# Patient Record
Sex: MALE | Race: WHITE | NOT HISPANIC OR LATINO | URBAN - METROPOLITAN AREA
[De-identification: names, ages, dates, MRNs, and addresses within clinical notes are randomized per-mention and may not be internally consistent; named-entity substitution may affect disease eponyms.]

---

## 2017-02-15 ENCOUNTER — APPOINTMENT (RX ONLY)
Dept: URBAN - METROPOLITAN AREA CLINIC 143 | Facility: CLINIC | Age: 77
Setting detail: DERMATOLOGY
End: 2017-02-15

## 2017-02-15 DIAGNOSIS — D22 MELANOCYTIC NEVI: ICD-10-CM

## 2017-02-15 DIAGNOSIS — L82.1 OTHER SEBORRHEIC KERATOSIS: ICD-10-CM

## 2017-02-15 DIAGNOSIS — L91.8 OTHER HYPERTROPHIC DISORDERS OF THE SKIN: ICD-10-CM

## 2017-02-15 DIAGNOSIS — D18.0 HEMANGIOMA: ICD-10-CM

## 2017-02-15 DIAGNOSIS — Z85.820 PERSONAL HISTORY OF MALIGNANT MELANOMA OF SKIN: ICD-10-CM

## 2017-02-15 DIAGNOSIS — Z85.828 PERSONAL HISTORY OF OTHER MALIGNANT NEOPLASM OF SKIN: ICD-10-CM

## 2017-02-15 DIAGNOSIS — L81.4 OTHER MELANIN HYPERPIGMENTATION: ICD-10-CM

## 2017-02-15 DIAGNOSIS — L57.0 ACTINIC KERATOSIS: ICD-10-CM

## 2017-02-15 PROBLEM — D18.01 HEMANGIOMA OF SKIN AND SUBCUTANEOUS TISSUE: Status: ACTIVE | Noted: 2017-02-15

## 2017-02-15 PROBLEM — D22.5 MELANOCYTIC NEVI OF TRUNK: Status: ACTIVE | Noted: 2017-02-15

## 2017-02-15 PROBLEM — I10 ESSENTIAL (PRIMARY) HYPERTENSION: Status: ACTIVE | Noted: 2017-02-15

## 2017-02-15 PROBLEM — M12.9 ARTHROPATHY, UNSPECIFIED: Status: ACTIVE | Noted: 2017-02-15

## 2017-02-15 PROCEDURE — ? LIQUID NITROGEN

## 2017-02-15 PROCEDURE — ? SKIN TAG REMOVAL (COSMETIC)

## 2017-02-15 PROCEDURE — 99214 OFFICE O/P EST MOD 30 MIN: CPT | Mod: 25

## 2017-02-15 PROCEDURE — ? COUNSELING

## 2017-02-15 PROCEDURE — 17000 DESTRUCT PREMALG LESION: CPT

## 2017-02-15 PROCEDURE — 17003 DESTRUCT PREMALG LES 2-14: CPT

## 2017-02-15 ASSESSMENT — LOCATION SIMPLE DESCRIPTION DERM
LOCATION SIMPLE: ABDOMEN
LOCATION SIMPLE: LEFT FOREHEAD
LOCATION SIMPLE: SUPERIOR FOREHEAD
LOCATION SIMPLE: POSTERIOR NECK
LOCATION SIMPLE: RIGHT FOREHEAD
LOCATION SIMPLE: POSTERIOR SCALP

## 2017-02-15 ASSESSMENT — LOCATION DETAILED DESCRIPTION DERM
LOCATION DETAILED: PERIUMBILICAL SKIN
LOCATION DETAILED: LEFT SUPERIOR FOREHEAD
LOCATION DETAILED: SUPERIOR MID FOREHEAD
LOCATION DETAILED: RIGHT LATERAL TRAPEZIAL NECK
LOCATION DETAILED: POSTERIOR MID-PARIETAL SCALP
LOCATION DETAILED: EPIGASTRIC SKIN
LOCATION DETAILED: RIGHT SUPERIOR FOREHEAD

## 2017-02-15 ASSESSMENT — LOCATION ZONE DERM
LOCATION ZONE: NECK
LOCATION ZONE: SCALP
LOCATION ZONE: FACE
LOCATION ZONE: TRUNK

## 2017-02-15 NOTE — PROCEDURE: COUNSELING
Quality 224: Stage 0-Iic Melanoma: Overutilization Of Imaging Studies For Only Stage 0-Iic Melanoma: None of the following diagnostic imaging studies ordered: chest X-ray, CT, Ultrasound, MRI, PET, or nuclear medicine scans (ML)
Detail Level: Detailed
Quality 137: Melanoma: Continuity Of Care - Recall System: Recall system not utilized, reason not otherwise specified

## 2017-02-15 NOTE — PROCEDURE: SKIN TAG REMOVAL (COSMETIC)
Anesthesia Volume In Cc: 0
Consent: Written consent obtained and the risks of skin tag removal was reviewed with the patient including but not limited to bleeding, pigmentary change, infection, pain, and remote possibility of scarring.
Removed With: liquid nitrogen
Price (Use Numbers Only, No Special Characters Or $): 0.00
Detail Level: Detailed

## 2017-02-15 NOTE — PROCEDURE: MIPS QUALITY
Quality 154 Part A: Falls: Risk Assessment (Should Be Reported With Measure 155.): Falls risk assessment completed and documented in the past 12 months.
Quality 226: Preventive Care And Screening: Tobacco Use: Screening And Cessation Intervention: Patient screened for tobacco and never smoked
Quality 431: Preventive Care And Screening: Unhealthy Alcohol Use - Screening: Patient screened for unhealthy alcohol use using a single question and scores less than 2 times per year
Quality 131: Pain Assessment And Follow-Up: Pain assessment using a standardized tool is documented as negative, no follow-up plan required
Quality 110: Preventive Care And Screening: Influenza Immunization: Influenza Immunization previously received during influenza season
Quality 111:Pneumonia Vaccination Status For Older Adults: Pneumococcal Vaccination Previously Received
Quality 155 (Denominator): Falls Plan Of Care: Plan of Care not Documented, Reason not Otherwise Specified
Detail Level: Detailed
Quality 47: Advance Care Plan: Advance Care Planning discussed and documented in the medical record; patient did not wish or was not able to name a surrogate decision maker or provide an advance care plan.
Quality 154 Part B: Falls: Risk Screening (Should Be Reported With Measure 155.): Patient screened for future fall risk; documentation of no falls in the past year or only one fall without injury in the past year

## 2018-07-16 PROBLEM — I10 BENIGN ESSENTIAL HTN: Status: ACTIVE | Noted: 2018-07-16

## 2018-07-16 PROBLEM — R39.12 BENIGN PROSTATIC HYPERPLASIA WITH WEAK URINARY STREAM: Status: ACTIVE | Noted: 2018-07-16

## 2018-07-16 PROBLEM — E78.2 MIXED HYPERLIPIDEMIA: Status: ACTIVE | Noted: 2018-07-16

## 2018-07-16 PROBLEM — K21.9 GASTROESOPHAGEAL REFLUX DISEASE WITHOUT ESOPHAGITIS: Status: ACTIVE | Noted: 2018-07-16

## 2018-07-16 PROBLEM — F41.9 ANXIETY: Status: ACTIVE | Noted: 2018-07-16

## 2018-07-16 PROBLEM — R41.3 MEMORY LOSS OF UNKNOWN CAUSE: Status: ACTIVE | Noted: 2018-07-16

## 2018-07-16 PROBLEM — Z98.890 S/P COLONOSCOPY: Chronic | Status: ACTIVE | Noted: 2018-07-16

## 2018-07-16 PROBLEM — N40.1 BENIGN PROSTATIC HYPERPLASIA WITH WEAK URINARY STREAM: Status: ACTIVE | Noted: 2018-07-16

## 2018-07-26 ENCOUNTER — HOSPITAL ENCOUNTER (EMERGENCY)
Age: 78
Discharge: HOME | End: 2018-07-26
Attending: EMERGENCY MEDICINE
Payer: MEDICARE

## 2018-07-26 ENCOUNTER — HOSPITAL ENCOUNTER (OUTPATIENT)
Dept: CT IMAGING | Age: 78
Discharge: HOME | End: 2018-07-26
Attending: FAMILY MEDICINE
Payer: MEDICARE

## 2018-07-26 DIAGNOSIS — R41.3 MEMORY LOSS OF UNKNOWN CAUSE: ICD-10-CM

## 2018-07-26 PROCEDURE — 75810000275 HC EMERGENCY DEPT VISIT NO LEVEL OF CARE: Performed by: EMERGENCY MEDICINE

## 2018-07-26 PROCEDURE — 70450 CT HEAD/BRAIN W/O DYE: CPT

## 2018-07-26 NOTE — ED NOTES
Pt signed in as an ER but was supposed to sign in with outpatient to have a CT scan done. Pt taken to outpatient entrance so CT can be done.

## 2018-08-22 ENCOUNTER — APPOINTMENT (RX ONLY)
Dept: URBAN - METROPOLITAN AREA CLINIC 349 | Facility: CLINIC | Age: 78
Setting detail: DERMATOLOGY
End: 2018-08-22

## 2018-08-22 DIAGNOSIS — L57.0 ACTINIC KERATOSIS: ICD-10-CM

## 2018-08-22 DIAGNOSIS — L82.1 OTHER SEBORRHEIC KERATOSIS: ICD-10-CM

## 2018-08-22 DIAGNOSIS — Z85.820 PERSONAL HISTORY OF MALIGNANT MELANOMA OF SKIN: ICD-10-CM

## 2018-08-22 PROBLEM — I10 ESSENTIAL (PRIMARY) HYPERTENSION: Status: ACTIVE | Noted: 2018-08-22

## 2018-08-22 PROBLEM — M12.9 ARTHROPATHY, UNSPECIFIED: Status: ACTIVE | Noted: 2018-08-22

## 2018-08-22 PROCEDURE — ? COUNSELING

## 2018-08-22 PROCEDURE — 99202 OFFICE O/P NEW SF 15 MIN: CPT | Mod: 25

## 2018-08-22 PROCEDURE — ? LIQUID NITROGEN

## 2018-08-22 PROCEDURE — 17004 DESTROY PREMAL LESIONS 15/>: CPT

## 2018-08-22 ASSESSMENT — LOCATION DETAILED DESCRIPTION DERM
LOCATION DETAILED: LEFT CENTRAL MALAR CHEEK
LOCATION DETAILED: LEFT SCAPHA
LOCATION DETAILED: RIGHT PROXIMAL DORSAL FOREARM
LOCATION DETAILED: LEFT INFERIOR CENTRAL MALAR CHEEK
LOCATION DETAILED: LEFT SUPERIOR LATERAL MALAR CHEEK
LOCATION DETAILED: RIGHT SUPERIOR LATERAL BUCCAL CHEEK
LOCATION DETAILED: LEFT SUPERIOR MEDIAL FOREHEAD
LOCATION DETAILED: NASAL DORSUM
LOCATION DETAILED: LEFT LATERAL FOREHEAD
LOCATION DETAILED: RIGHT LATERAL EYEBROW
LOCATION DETAILED: LEFT SUPERIOR PARIETAL SCALP
LOCATION DETAILED: LEFT SCAPHA
LOCATION DETAILED: RIGHT INFERIOR MEDIAL UPPER BACK
LOCATION DETAILED: RIGHT SUPERIOR PARIETAL SCALP
LOCATION DETAILED: LEFT RIB CAGE
LOCATION DETAILED: LEFT SUPERIOR HELIX
LOCATION DETAILED: LEFT SUPERIOR CRUS OF ANTIHELIX
LOCATION DETAILED: LEFT MEDIAL INFERIOR CHEST
LOCATION DETAILED: LEFT PROXIMAL DORSAL FOREARM
LOCATION DETAILED: LEFT FOREHEAD
LOCATION DETAILED: LEFT INFERIOR LATERAL FOREHEAD
LOCATION DETAILED: RIGHT INFERIOR CENTRAL MALAR CHEEK

## 2018-08-22 ASSESSMENT — LOCATION SIMPLE DESCRIPTION DERM
LOCATION SIMPLE: NOSE
LOCATION SIMPLE: RIGHT FOREARM
LOCATION SIMPLE: LEFT FOREHEAD
LOCATION SIMPLE: SCALP
LOCATION SIMPLE: CHEST
LOCATION SIMPLE: LEFT EAR
LOCATION SIMPLE: LEFT EAR
LOCATION SIMPLE: RIGHT EYEBROW
LOCATION SIMPLE: ABDOMEN
LOCATION SIMPLE: RIGHT UPPER BACK
LOCATION SIMPLE: LEFT FOREHEAD
LOCATION SIMPLE: LEFT FOREARM
LOCATION SIMPLE: LEFT CHEEK
LOCATION SIMPLE: RIGHT CHEEK

## 2018-08-22 ASSESSMENT — LOCATION ZONE DERM
LOCATION ZONE: FACE
LOCATION ZONE: NOSE
LOCATION ZONE: EAR
LOCATION ZONE: ARM
LOCATION ZONE: EAR
LOCATION ZONE: TRUNK
LOCATION ZONE: FACE
LOCATION ZONE: SCALP

## 2018-08-22 NOTE — PROCEDURE: LIQUID NITROGEN
Number Of Freeze-Thaw Cycles: 2 freeze-thaw cycles
Detail Level: Zone
Consent: The patient's consent was obtained including but not limited to risks of crusting, scabbing, blistering, scarring, darker or lighter pigmentary change, recurrence, incomplete removal and infection.
Render Post-Care Instructions In Note?: no
Duration Of Freeze Thaw-Cycle (Seconds): 3
Post-Care Instructions: I reviewed with the patient in detail post-care instructions. Patient is to wear sunprotection, and avoid picking at any of the treated lesions. Pt may apply Vaseline to crusted or scabbing areas.

## 2018-08-22 NOTE — PROCEDURE: COUNSELING
Detail Level: Generalized
Quality 224: Stage 0-Iic Melanoma: Overutilization Of Imaging Studies For Only Stage 0-Iic Melanoma: None of the following diagnostic imaging studies ordered: chest X-ray, CT, Ultrasound, MRI, PET, or nuclear medicine scans (ML)
Detail Level: Detailed
Quality 137: Melanoma: Continuity Of Care - Recall System: Recall system not utilized, reason not otherwise specified

## 2019-07-18 ENCOUNTER — HOSPITAL ENCOUNTER (OUTPATIENT)
Dept: GENERAL RADIOLOGY | Age: 79
Discharge: HOME | End: 2019-07-18
Attending: FAMILY MEDICINE
Payer: MEDICARE

## 2019-07-18 PROBLEM — R41.3 MEMORY LOSS OF UNKNOWN CAUSE: Status: RESOLVED | Noted: 2018-07-16 | Resolved: 2019-07-18

## 2019-07-18 PROCEDURE — 73030 X-RAY EXAM OF SHOULDER: CPT

## 2019-08-03 ENCOUNTER — HOSPITAL ENCOUNTER (OUTPATIENT)
Dept: CT IMAGING | Age: 79
Discharge: HOME | End: 2019-08-03
Attending: FAMILY MEDICINE
Payer: MEDICARE

## 2019-08-03 ENCOUNTER — HOSPITAL ENCOUNTER (OUTPATIENT)
Dept: MRI IMAGING | Age: 79
Discharge: HOME | End: 2019-08-03
Attending: FAMILY MEDICINE
Payer: MEDICARE

## 2019-08-03 DIAGNOSIS — M25.511 CHRONIC RIGHT SHOULDER PAIN: ICD-10-CM

## 2019-08-03 DIAGNOSIS — R63.4 UNINTENDED WEIGHT LOSS: ICD-10-CM

## 2019-08-03 DIAGNOSIS — K52.9 CHRONIC DIARRHEA: ICD-10-CM

## 2019-08-03 DIAGNOSIS — G89.29 CHRONIC RIGHT SHOULDER PAIN: ICD-10-CM

## 2019-08-03 PROCEDURE — 73221 MRI JOINT UPR EXTREM W/O DYE: CPT

## 2019-08-03 PROCEDURE — 74177 CT ABD & PELVIS W/CONTRAST: CPT

## 2019-08-03 PROCEDURE — 74011000258 HC RX REV CODE- 258: Performed by: FAMILY MEDICINE

## 2019-08-03 PROCEDURE — 74011636320 HC RX REV CODE- 636/320: Performed by: FAMILY MEDICINE

## 2019-08-03 RX ORDER — SODIUM CHLORIDE 0.9 % (FLUSH) 0.9 %
10 SYRINGE (ML) INJECTION
Status: COMPLETED | OUTPATIENT
Start: 2019-08-03 | End: 2019-08-03

## 2019-08-03 RX ADMIN — SODIUM CHLORIDE 100 ML: 900 INJECTION, SOLUTION INTRAVENOUS at 09:00

## 2019-08-03 RX ADMIN — Medication 10 ML: at 09:00

## 2019-08-03 RX ADMIN — IOPAMIDOL 100 ML: 755 INJECTION, SOLUTION INTRAVENOUS at 09:00

## 2019-08-03 RX ADMIN — DIATRIZOATE MEGLUMINE AND DIATRIZOATE SODIUM 15 ML: 660; 100 LIQUID ORAL; RECTAL at 09:00

## 2019-08-07 PROCEDURE — 88312 SPECIAL STAINS GROUP 1: CPT

## 2019-08-07 PROCEDURE — 88305 TISSUE EXAM BY PATHOLOGIST: CPT

## 2019-08-08 ENCOUNTER — HOSPITAL ENCOUNTER (OUTPATIENT)
Dept: LAB | Age: 79
Discharge: HOME | End: 2019-08-08

## 2019-08-22 ENCOUNTER — APPOINTMENT (RX ONLY)
Dept: URBAN - METROPOLITAN AREA CLINIC 349 | Facility: CLINIC | Age: 79
Setting detail: DERMATOLOGY
End: 2019-08-22

## 2019-08-22 DIAGNOSIS — D22 MELANOCYTIC NEVI: ICD-10-CM

## 2019-08-22 DIAGNOSIS — L57.0 ACTINIC KERATOSIS: ICD-10-CM

## 2019-08-22 DIAGNOSIS — Z71.89 OTHER SPECIFIED COUNSELING: ICD-10-CM

## 2019-08-22 DIAGNOSIS — Z85.820 PERSONAL HISTORY OF MALIGNANT MELANOMA OF SKIN: ICD-10-CM

## 2019-08-22 PROBLEM — H91.90 UNSPECIFIED HEARING LOSS, UNSPECIFIED EAR: Status: ACTIVE | Noted: 2019-08-22

## 2019-08-22 PROBLEM — D22.5 MELANOCYTIC NEVI OF TRUNK: Status: ACTIVE | Noted: 2019-08-22

## 2019-08-22 PROCEDURE — ? LIQUID NITROGEN

## 2019-08-22 PROCEDURE — 99213 OFFICE O/P EST LOW 20 MIN: CPT | Mod: 25

## 2019-08-22 PROCEDURE — ? COUNSELING

## 2019-08-22 PROCEDURE — 17003 DESTRUCT PREMALG LES 2-14: CPT

## 2019-08-22 PROCEDURE — 17000 DESTRUCT PREMALG LESION: CPT

## 2019-08-22 ASSESSMENT — LOCATION SIMPLE DESCRIPTION DERM
LOCATION SIMPLE: RIGHT CHEEK
LOCATION SIMPLE: RIGHT EAR
LOCATION SIMPLE: RIGHT TEMPLE
LOCATION SIMPLE: LEFT CHEEK
LOCATION SIMPLE: LEFT UPPER BACK
LOCATION SIMPLE: CHEST
LOCATION SIMPLE: LEFT EAR
LOCATION SIMPLE: LEFT SCALP
LOCATION SIMPLE: LEFT FOREHEAD
LOCATION SIMPLE: RIGHT FOREHEAD

## 2019-08-22 ASSESSMENT — LOCATION DETAILED DESCRIPTION DERM
LOCATION DETAILED: LEFT CENTRAL FRONTAL SCALP
LOCATION DETAILED: RIGHT MID TEMPLE
LOCATION DETAILED: RIGHT CENTRAL MALAR CHEEK
LOCATION DETAILED: LEFT FOREHEAD
LOCATION DETAILED: LEFT LATERAL FRONTAL SCALP
LOCATION DETAILED: LEFT SUPERIOR UPPER BACK
LOCATION DETAILED: LEFT MID-UPPER BACK
LOCATION DETAILED: LEFT TRAGUS
LOCATION DETAILED: LEFT SUPERIOR LATERAL FOREHEAD
LOCATION DETAILED: RIGHT SUPERIOR HELIX
LOCATION DETAILED: LEFT LATERAL MALAR CHEEK
LOCATION DETAILED: LEFT MEDIAL INFERIOR CHEST
LOCATION DETAILED: LEFT SUPERIOR MEDIAL UPPER BACK
LOCATION DETAILED: RIGHT SUPERIOR FOREHEAD

## 2019-08-22 ASSESSMENT — LOCATION ZONE DERM
LOCATION ZONE: FACE
LOCATION ZONE: SCALP
LOCATION ZONE: EAR
LOCATION ZONE: TRUNK

## 2019-08-22 ASSESSMENT — PAIN INTENSITY VAS: HOW INTENSE IS YOUR PAIN 0 BEING NO PAIN, 10 BEING THE MOST SEVERE PAIN POSSIBLE?: NO PAIN

## 2019-08-22 NOTE — PROCEDURE: COUNSELING
Quality 137: Melanoma: Continuity Of Care - Recall System: Recall system not utilized, reason not otherwise specified
Detail Level: Generalized
Detail Level: Detailed
Detail Level: Zone

## 2019-08-22 NOTE — PROCEDURE: LIQUID NITROGEN
Render Post-Care Instructions In Note?: no
Duration Of Freeze Thaw-Cycle (Seconds): 3
Post-Care Instructions: I reviewed with the patient in detail post-care instructions. Patient is to wear sunprotection, and avoid picking at any of the treated lesions. Pt may apply Vaseline to crusted or scabbing areas.
Number Of Freeze-Thaw Cycles: 2 freeze-thaw cycles
Consent: The patient's consent was obtained including but not limited to risks of crusting, scabbing, blistering, scarring, darker or lighter pigmentary change, recurrence, incomplete removal and infection.
Detail Level: Zone

## 2020-02-01 ENCOUNTER — HOSPITAL ENCOUNTER (OUTPATIENT)
Dept: CT IMAGING | Age: 80
Discharge: HOME | End: 2020-02-01
Attending: PHYSICIAN ASSISTANT
Payer: MEDICARE

## 2020-02-01 DIAGNOSIS — R41.89 COGNITIVE DECLINE: ICD-10-CM

## 2020-02-01 DIAGNOSIS — R41.3 MEMORY LOSS: ICD-10-CM

## 2020-02-01 PROCEDURE — 70450 CT HEAD/BRAIN W/O DYE: CPT

## 2020-02-10 NOTE — PROGRESS NOTES
Spoke with patient's daughter (on HIPPA) about results. Discussed referral to [facility] if patient is agreeable. Left message for patient to return call to discuss these results with me.

## 2020-02-12 NOTE — PROGRESS NOTES
Discussed results with patient and he is agreeable to the referral so I will place that and he will be expecting their call. I advised that he discuss with his children when they might be able to come and be a part of that appointment so that he can schedule it appropriately when the specialist contacts him.

## 2020-04-02 PROBLEM — Z02.2 ENCOUNTER FOR EXAMINATION FOR ADMISSION TO ASSISTED LIVING FACILITY: Status: ACTIVE | Noted: 2020-01-01

## 2021-01-01 ENCOUNTER — APPOINTMENT (OUTPATIENT)
Dept: GENERAL RADIOLOGY | Age: 81
DRG: 064 | End: 2021-01-01
Attending: FAMILY MEDICINE
Payer: MEDICARE

## 2021-01-01 ENCOUNTER — APPOINTMENT (OUTPATIENT)
Dept: CT IMAGING | Age: 81
DRG: 064 | End: 2021-01-01
Attending: EMERGENCY MEDICINE
Payer: MEDICARE

## 2021-01-01 ENCOUNTER — APPOINTMENT (OUTPATIENT)
Dept: GENERAL RADIOLOGY | Age: 81
DRG: 064 | End: 2021-01-01
Attending: INTERNAL MEDICINE
Payer: MEDICARE

## 2021-01-01 ENCOUNTER — APPOINTMENT (OUTPATIENT)
Dept: MRI IMAGING | Age: 81
DRG: 064 | End: 2021-01-01
Attending: FAMILY MEDICINE
Payer: MEDICARE

## 2021-01-01 ENCOUNTER — HOSPICE ADMISSION (OUTPATIENT)
Dept: HOSPICE | Facility: HOSPICE | Age: 81
End: 2021-01-01

## 2021-01-01 ENCOUNTER — HOSPITAL ENCOUNTER (INPATIENT)
Age: 81
LOS: 8 days | DRG: 064 | End: 2021-03-01
Attending: EMERGENCY MEDICINE | Admitting: FAMILY MEDICINE
Payer: MEDICARE

## 2021-01-01 ENCOUNTER — HOSPITAL ENCOUNTER (EMERGENCY)
Age: 81
Discharge: NURSING, REHAB OR LONG-TERM CARE | End: 2021-02-21
Attending: STUDENT IN AN ORGANIZED HEALTH CARE EDUCATION/TRAINING PROGRAM
Payer: MEDICARE

## 2021-01-01 ENCOUNTER — APPOINTMENT (OUTPATIENT)
Dept: GENERAL RADIOLOGY | Age: 81
DRG: 064 | End: 2021-01-01
Attending: NURSE PRACTITIONER
Payer: MEDICARE

## 2021-01-01 ENCOUNTER — APPOINTMENT (OUTPATIENT)
Dept: NON INVASIVE DIAGNOSTICS | Age: 81
DRG: 064 | End: 2021-01-01
Attending: FAMILY MEDICINE
Payer: MEDICARE

## 2021-01-01 VITALS
TEMPERATURE: 97.9 F | RESPIRATION RATE: 14 BRPM | BODY MASS INDEX: 22.13 KG/M2 | HEIGHT: 68 IN | SYSTOLIC BLOOD PRESSURE: 126 MMHG | HEART RATE: 88 BPM | WEIGHT: 146 LBS | DIASTOLIC BLOOD PRESSURE: 83 MMHG | OXYGEN SATURATION: 96 %

## 2021-01-01 VITALS
HEART RATE: 119 BPM | RESPIRATION RATE: 30 BRPM | TEMPERATURE: 102.2 F | BODY MASS INDEX: 23 KG/M2 | WEIGHT: 129.8 LBS | OXYGEN SATURATION: 97 % | HEIGHT: 63 IN | DIASTOLIC BLOOD PRESSURE: 61 MMHG | SYSTOLIC BLOOD PRESSURE: 112 MMHG

## 2021-01-01 DIAGNOSIS — T18.108A FOREIGN BODY IN ESOPHAGUS, INITIAL ENCOUNTER: Primary | ICD-10-CM

## 2021-01-01 DIAGNOSIS — I63.81 CEREBROVASCULAR ACCIDENT (CVA) DUE TO OCCLUSION OF SMALL ARTERY (HCC): ICD-10-CM

## 2021-01-01 DIAGNOSIS — R29.810 FACIAL DROOP: Primary | ICD-10-CM

## 2021-01-01 DIAGNOSIS — E87.6 HYPOKALEMIA: ICD-10-CM

## 2021-01-01 DIAGNOSIS — R13.19 OTHER DYSPHAGIA: ICD-10-CM

## 2021-01-01 DIAGNOSIS — R13.10 DYSPHAGIA, UNSPECIFIED TYPE: ICD-10-CM

## 2021-01-01 LAB
ALBUMIN SERPL-MCNC: 3.8 G/DL (ref 3.5–5)
ALBUMIN/GLOB SERPL: 1.1 {RATIO} (ref 1.1–2.2)
ALP SERPL-CCNC: 57 U/L (ref 45–117)
ALT SERPL-CCNC: 20 U/L (ref 12–78)
ANION GAP SERPL CALC-SCNC: 10 MMOL/L (ref 5–15)
ANION GAP SERPL CALC-SCNC: 10 MMOL/L (ref 5–15)
ANION GAP SERPL CALC-SCNC: 11 MMOL/L (ref 5–15)
ANION GAP SERPL CALC-SCNC: 2 MMOL/L (ref 5–15)
ANION GAP SERPL CALC-SCNC: 5 MMOL/L (ref 5–15)
ANION GAP SERPL CALC-SCNC: 5 MMOL/L (ref 5–15)
ANION GAP SERPL CALC-SCNC: 6 MMOL/L (ref 5–15)
ANION GAP SERPL CALC-SCNC: 7 MMOL/L (ref 5–15)
ANION GAP SERPL CALC-SCNC: 8 MMOL/L (ref 5–15)
APPEARANCE UR: CLEAR
APTT PPP: 29.2 SEC (ref 22.1–31)
ARTERIAL PATENCY WRIST A: NO
ASPIRIN TEST, ASPIRN: 414 ARU
AST SERPL-CCNC: 18 U/L (ref 15–37)
ATRIAL RATE: 122 BPM
ATRIAL RATE: 91 BPM
BACTERIA URNS QL MICRO: NEGATIVE /HPF
BASE EXCESS BLD CALC-SCNC: 4 MMOL/L
BASOPHILS # BLD: 0 K/UL (ref 0–0.1)
BASOPHILS # BLD: 0.1 K/UL (ref 0–0.1)
BASOPHILS NFR BLD: 0 % (ref 0–1)
BASOPHILS NFR BLD: 1 % (ref 0–1)
BDY SITE: ABNORMAL
BILIRUB SERPL-MCNC: 0.5 MG/DL (ref 0.2–1)
BILIRUB UR QL: NEGATIVE
BUN SERPL-MCNC: 12 MG/DL (ref 6–20)
BUN SERPL-MCNC: 16 MG/DL (ref 6–20)
BUN SERPL-MCNC: 19 MG/DL (ref 6–20)
BUN SERPL-MCNC: 19 MG/DL (ref 6–20)
BUN SERPL-MCNC: 20 MG/DL (ref 6–20)
BUN SERPL-MCNC: 22 MG/DL (ref 6–20)
BUN SERPL-MCNC: 22 MG/DL (ref 6–20)
BUN SERPL-MCNC: 27 MG/DL (ref 6–20)
BUN SERPL-MCNC: 27 MG/DL (ref 6–20)
BUN SERPL-MCNC: 29 MG/DL (ref 6–20)
BUN SERPL-MCNC: 29 MG/DL (ref 6–20)
BUN/CREAT SERPL: 15 (ref 12–20)
BUN/CREAT SERPL: 16 (ref 12–20)
BUN/CREAT SERPL: 19 (ref 12–20)
BUN/CREAT SERPL: 19 (ref 12–20)
BUN/CREAT SERPL: 20 (ref 12–20)
BUN/CREAT SERPL: 24 (ref 12–20)
BUN/CREAT SERPL: 25 (ref 12–20)
BUN/CREAT SERPL: 26 (ref 12–20)
BUN/CREAT SERPL: 30 (ref 12–20)
CA-I BLD-SCNC: 1.16 MMOL/L (ref 1.12–1.32)
CALCIUM SERPL-MCNC: 7.9 MG/DL (ref 8.5–10.1)
CALCIUM SERPL-MCNC: 8 MG/DL (ref 8.5–10.1)
CALCIUM SERPL-MCNC: 8.1 MG/DL (ref 8.5–10.1)
CALCIUM SERPL-MCNC: 8.2 MG/DL (ref 8.5–10.1)
CALCIUM SERPL-MCNC: 8.7 MG/DL (ref 8.5–10.1)
CALCIUM SERPL-MCNC: 8.8 MG/DL (ref 8.5–10.1)
CALCIUM SERPL-MCNC: 9.1 MG/DL (ref 8.5–10.1)
CALCIUM SERPL-MCNC: 9.1 MG/DL (ref 8.5–10.1)
CALCULATED P AXIS, ECG09: 30 DEGREES
CALCULATED P AXIS, ECG09: 55 DEGREES
CALCULATED R AXIS, ECG10: -19 DEGREES
CALCULATED R AXIS, ECG10: -3 DEGREES
CALCULATED T AXIS, ECG11: -13 DEGREES
CALCULATED T AXIS, ECG11: 18 DEGREES
CHLORIDE SERPL-SCNC: 107 MMOL/L (ref 97–108)
CHLORIDE SERPL-SCNC: 109 MMOL/L (ref 97–108)
CHLORIDE SERPL-SCNC: 110 MMOL/L (ref 97–108)
CHLORIDE SERPL-SCNC: 110 MMOL/L (ref 97–108)
CHLORIDE SERPL-SCNC: 111 MMOL/L (ref 97–108)
CHLORIDE SERPL-SCNC: 113 MMOL/L (ref 97–108)
CHLORIDE SERPL-SCNC: 114 MMOL/L (ref 97–108)
CHLORIDE SERPL-SCNC: 119 MMOL/L (ref 97–108)
CHLORIDE SERPL-SCNC: 119 MMOL/L (ref 97–108)
CHLORIDE SERPL-SCNC: 122 MMOL/L (ref 97–108)
CHLORIDE SERPL-SCNC: 94 MMOL/L (ref 97–108)
CHOLEST SERPL-MCNC: 173 MG/DL
CO2 SERPL-SCNC: 22 MMOL/L (ref 21–32)
CO2 SERPL-SCNC: 22 MMOL/L (ref 21–32)
CO2 SERPL-SCNC: 23 MMOL/L (ref 21–32)
CO2 SERPL-SCNC: 25 MMOL/L (ref 21–32)
CO2 SERPL-SCNC: 28 MMOL/L (ref 21–32)
CO2 SERPL-SCNC: 29 MMOL/L (ref 21–32)
CO2 SERPL-SCNC: 30 MMOL/L (ref 21–32)
CO2 SERPL-SCNC: 31 MMOL/L (ref 21–32)
CO2 SERPL-SCNC: 31 MMOL/L (ref 21–32)
COLOR UR: ABNORMAL
COMMENT, HOLDF: NORMAL
CREAT SERPL-MCNC: 0.81 MG/DL (ref 0.7–1.3)
CREAT SERPL-MCNC: 0.81 MG/DL (ref 0.7–1.3)
CREAT SERPL-MCNC: 0.85 MG/DL (ref 0.7–1.3)
CREAT SERPL-MCNC: 0.93 MG/DL (ref 0.7–1.3)
CREAT SERPL-MCNC: 0.97 MG/DL (ref 0.7–1.3)
CREAT SERPL-MCNC: 1.02 MG/DL (ref 0.7–1.3)
CREAT SERPL-MCNC: 1.03 MG/DL (ref 0.7–1.3)
CREAT SERPL-MCNC: 1.04 MG/DL (ref 0.7–1.3)
CREAT SERPL-MCNC: 1.04 MG/DL (ref 0.7–1.3)
CREAT SERPL-MCNC: 1.14 MG/DL (ref 0.7–1.3)
CREAT SERPL-MCNC: 1.21 MG/DL (ref 0.7–1.3)
DIAGNOSIS, 93000: NORMAL
DIAGNOSIS, 93000: NORMAL
DIFFERENTIAL METHOD BLD: ABNORMAL
ECHO TV REGURGITANT MAX VELOCITY: 269.4 CM/S
ECHO TV REGURGITANT PEAK GRADIENT: 29.03 MMHG
EOSINOPHIL # BLD: 0 K/UL (ref 0–0.4)
EOSINOPHIL # BLD: 0.1 K/UL (ref 0–0.4)
EOSINOPHIL # BLD: 0.1 K/UL (ref 0–0.4)
EOSINOPHIL # BLD: 0.3 K/UL (ref 0–0.4)
EOSINOPHIL # BLD: 0.3 K/UL (ref 0–0.4)
EOSINOPHIL NFR BLD: 0 % (ref 0–7)
EOSINOPHIL NFR BLD: 1 % (ref 0–7)
EOSINOPHIL NFR BLD: 1 % (ref 0–7)
EOSINOPHIL NFR BLD: 2 % (ref 0–7)
EOSINOPHIL NFR BLD: 3 % (ref 0–7)
EPITH CASTS URNS QL MICRO: ABNORMAL /LPF
ERYTHROCYTE [DISTWIDTH] IN BLOOD BY AUTOMATED COUNT: 12.5 % (ref 11.5–14.5)
ERYTHROCYTE [DISTWIDTH] IN BLOOD BY AUTOMATED COUNT: 12.7 % (ref 11.5–14.5)
ERYTHROCYTE [DISTWIDTH] IN BLOOD BY AUTOMATED COUNT: 12.9 % (ref 11.5–14.5)
ERYTHROCYTE [DISTWIDTH] IN BLOOD BY AUTOMATED COUNT: 13.2 % (ref 11.5–14.5)
ERYTHROCYTE [DISTWIDTH] IN BLOOD BY AUTOMATED COUNT: 13.3 % (ref 11.5–14.5)
ERYTHROCYTE [DISTWIDTH] IN BLOOD BY AUTOMATED COUNT: 13.4 % (ref 11.5–14.5)
EST. AVERAGE GLUCOSE BLD GHB EST-MCNC: 117 MG/DL
FIBRINOGEN PPP-MCNC: ABNORMAL MG/DL (ref 200–475)
GAS FLOW.O2 O2 DELIVERY SYS: ABNORMAL L/MIN
GAS FLOW.O2 SETTING OXYMISER: 5 L/M
GLOBULIN SER CALC-MCNC: 3.5 G/DL (ref 2–4)
GLUCOSE BLD STRIP.AUTO-MCNC: 105 MG/DL (ref 65–100)
GLUCOSE BLD STRIP.AUTO-MCNC: 109 MG/DL (ref 65–100)
GLUCOSE BLD STRIP.AUTO-MCNC: 115 MG/DL (ref 65–100)
GLUCOSE BLD STRIP.AUTO-MCNC: 117 MG/DL (ref 65–100)
GLUCOSE BLD STRIP.AUTO-MCNC: 125 MG/DL (ref 65–100)
GLUCOSE BLD STRIP.AUTO-MCNC: 128 MG/DL (ref 65–100)
GLUCOSE BLD STRIP.AUTO-MCNC: 133 MG/DL (ref 65–100)
GLUCOSE BLD STRIP.AUTO-MCNC: 145 MG/DL (ref 65–100)
GLUCOSE BLD STRIP.AUTO-MCNC: 150 MG/DL (ref 65–100)
GLUCOSE BLD STRIP.AUTO-MCNC: 165 MG/DL (ref 65–100)
GLUCOSE BLD STRIP.AUTO-MCNC: 84 MG/DL (ref 65–100)
GLUCOSE BLD STRIP.AUTO-MCNC: 86 MG/DL (ref 65–100)
GLUCOSE BLD STRIP.AUTO-MCNC: 87 MG/DL (ref 65–100)
GLUCOSE BLD STRIP.AUTO-MCNC: 89 MG/DL (ref 65–100)
GLUCOSE BLD STRIP.AUTO-MCNC: 90 MG/DL (ref 65–100)
GLUCOSE BLD STRIP.AUTO-MCNC: 91 MG/DL (ref 65–100)
GLUCOSE BLD STRIP.AUTO-MCNC: 94 MG/DL (ref 65–100)
GLUCOSE BLD STRIP.AUTO-MCNC: 94 MG/DL (ref 65–100)
GLUCOSE BLD STRIP.AUTO-MCNC: 96 MG/DL (ref 65–100)
GLUCOSE SERPL-MCNC: 101 MG/DL (ref 65–100)
GLUCOSE SERPL-MCNC: 116 MG/DL (ref 65–100)
GLUCOSE SERPL-MCNC: 116 MG/DL (ref 65–100)
GLUCOSE SERPL-MCNC: 123 MG/DL (ref 65–100)
GLUCOSE SERPL-MCNC: 129 MG/DL (ref 65–100)
GLUCOSE SERPL-MCNC: 132 MG/DL (ref 65–100)
GLUCOSE SERPL-MCNC: 147 MG/DL (ref 65–100)
GLUCOSE SERPL-MCNC: 151 MG/DL (ref 65–100)
GLUCOSE SERPL-MCNC: 87 MG/DL (ref 65–100)
GLUCOSE SERPL-MCNC: 98 MG/DL (ref 65–100)
GLUCOSE SERPL-MCNC: 98 MG/DL (ref 65–100)
GLUCOSE UR STRIP.AUTO-MCNC: NEGATIVE MG/DL
HBA1C MFR BLD: 5.7 % (ref 4–5.6)
HCO3 BLD-SCNC: 28.8 MMOL/L (ref 22–26)
HCT VFR BLD AUTO: 40.3 % (ref 36.6–50.3)
HCT VFR BLD AUTO: 40.3 % (ref 36.6–50.3)
HCT VFR BLD AUTO: 40.8 % (ref 36.6–50.3)
HCT VFR BLD AUTO: 43 % (ref 36.6–50.3)
HCT VFR BLD AUTO: 43.9 % (ref 36.6–50.3)
HCT VFR BLD AUTO: 44.5 % (ref 36.6–50.3)
HCT VFR BLD AUTO: 45.4 % (ref 36.6–50.3)
HCT VFR BLD AUTO: 47 % (ref 36.6–50.3)
HDLC SERPL-MCNC: 50 MG/DL
HDLC SERPL: 3.5 {RATIO} (ref 0–5)
HGB BLD-MCNC: 13.3 G/DL (ref 12.1–17)
HGB BLD-MCNC: 13.4 G/DL (ref 12.1–17)
HGB BLD-MCNC: 13.8 G/DL (ref 12.1–17)
HGB BLD-MCNC: 13.8 G/DL (ref 12.1–17)
HGB BLD-MCNC: 14.1 G/DL (ref 12.1–17)
HGB BLD-MCNC: 14.3 G/DL (ref 12.1–17)
HGB BLD-MCNC: 14.6 G/DL (ref 12.1–17)
HGB BLD-MCNC: 15.1 G/DL (ref 12.1–17)
HGB UR QL STRIP: ABNORMAL
HYALINE CASTS URNS QL MICRO: ABNORMAL /LPF (ref 0–5)
IMM GRANULOCYTES # BLD AUTO: 0 K/UL (ref 0–0.04)
IMM GRANULOCYTES # BLD AUTO: 0.1 K/UL (ref 0–0.04)
IMM GRANULOCYTES # BLD AUTO: 0.2 K/UL (ref 0–0.04)
IMM GRANULOCYTES NFR BLD AUTO: 0 % (ref 0–0.5)
IMM GRANULOCYTES NFR BLD AUTO: 1 % (ref 0–0.5)
INR PPP: 1.1 (ref 0.9–1.1)
KETONES UR QL STRIP.AUTO: NEGATIVE MG/DL
LACTATE SERPL-SCNC: 1 MMOL/L (ref 0.4–2)
LDLC SERPL CALC-MCNC: 106.6 MG/DL (ref 0–100)
LEUKOCYTE ESTERASE UR QL STRIP.AUTO: NEGATIVE
LIPID PROFILE,FLP: ABNORMAL
LYMPHOCYTES # BLD: 0.4 K/UL (ref 0.8–3.5)
LYMPHOCYTES # BLD: 0.9 K/UL (ref 0.8–3.5)
LYMPHOCYTES # BLD: 1.1 K/UL (ref 0.8–3.5)
LYMPHOCYTES # BLD: 1.2 K/UL (ref 0.8–3.5)
LYMPHOCYTES # BLD: 1.5 K/UL (ref 0.8–3.5)
LYMPHOCYTES # BLD: 1.5 K/UL (ref 0.8–3.5)
LYMPHOCYTES # BLD: 1.6 K/UL (ref 0.8–3.5)
LYMPHOCYTES # BLD: 2.1 K/UL (ref 0.8–3.5)
LYMPHOCYTES NFR BLD: 10 % (ref 12–49)
LYMPHOCYTES NFR BLD: 11 % (ref 12–49)
LYMPHOCYTES NFR BLD: 13 % (ref 12–49)
LYMPHOCYTES NFR BLD: 18 % (ref 12–49)
LYMPHOCYTES NFR BLD: 2 % (ref 12–49)
LYMPHOCYTES NFR BLD: 7 % (ref 12–49)
LYMPHOCYTES NFR BLD: 7 % (ref 12–49)
LYMPHOCYTES NFR BLD: 8 % (ref 12–49)
MAGNESIUM SERPL-MCNC: 2.1 MG/DL (ref 1.6–2.4)
MAGNESIUM SERPL-MCNC: 2.2 MG/DL (ref 1.6–2.4)
MAGNESIUM SERPL-MCNC: 2.7 MG/DL (ref 1.6–2.4)
MAGNESIUM SERPL-MCNC: 2.7 MG/DL (ref 1.6–2.4)
MCH RBC QN AUTO: 31.3 PG (ref 26–34)
MCH RBC QN AUTO: 31.4 PG (ref 26–34)
MCH RBC QN AUTO: 31.4 PG (ref 26–34)
MCH RBC QN AUTO: 31.7 PG (ref 26–34)
MCH RBC QN AUTO: 31.8 PG (ref 26–34)
MCH RBC QN AUTO: 31.9 PG (ref 26–34)
MCHC RBC AUTO-ENTMCNC: 32.1 G/DL (ref 30–36.5)
MCHC RBC AUTO-ENTMCNC: 32.2 G/DL (ref 30–36.5)
MCHC RBC AUTO-ENTMCNC: 33 G/DL (ref 30–36.5)
MCHC RBC AUTO-ENTMCNC: 33.3 G/DL (ref 30–36.5)
MCHC RBC AUTO-ENTMCNC: 33.8 G/DL (ref 30–36.5)
MCV RBC AUTO: 94 FL (ref 80–99)
MCV RBC AUTO: 94.4 FL (ref 80–99)
MCV RBC AUTO: 96 FL (ref 80–99)
MCV RBC AUTO: 97.4 FL (ref 80–99)
MCV RBC AUTO: 97.4 FL (ref 80–99)
MCV RBC AUTO: 97.6 FL (ref 80–99)
MCV RBC AUTO: 97.7 FL (ref 80–99)
MCV RBC AUTO: 99.3 FL (ref 80–99)
MONOCYTES # BLD: 0.8 K/UL (ref 0–1)
MONOCYTES # BLD: 0.9 K/UL (ref 0–1)
MONOCYTES # BLD: 1 K/UL (ref 0–1)
MONOCYTES # BLD: 1.1 K/UL (ref 0–1)
MONOCYTES # BLD: 1.5 K/UL (ref 0–1)
MONOCYTES # BLD: 1.9 K/UL (ref 0–1)
MONOCYTES NFR BLD: 7 % (ref 5–13)
MONOCYTES NFR BLD: 8 % (ref 5–13)
MONOCYTES NFR BLD: 8 % (ref 5–13)
MONOCYTES NFR BLD: 9 % (ref 5–13)
NEUTS SEG # BLD: 11.1 K/UL (ref 1.8–8)
NEUTS SEG # BLD: 11.2 K/UL (ref 1.8–8)
NEUTS SEG # BLD: 13.3 K/UL (ref 1.8–8)
NEUTS SEG # BLD: 17.7 K/UL (ref 1.8–8)
NEUTS SEG # BLD: 19.5 K/UL (ref 1.8–8)
NEUTS SEG # BLD: 8.4 K/UL (ref 1.8–8)
NEUTS SEG # BLD: 9.2 K/UL (ref 1.8–8)
NEUTS SEG # BLD: 9.7 K/UL (ref 1.8–8)
NEUTS SEG NFR BLD: 70 % (ref 32–75)
NEUTS SEG NFR BLD: 78 % (ref 32–75)
NEUTS SEG NFR BLD: 80 % (ref 32–75)
NEUTS SEG NFR BLD: 83 % (ref 32–75)
NEUTS SEG NFR BLD: 84 % (ref 32–75)
NEUTS SEG NFR BLD: 85 % (ref 32–75)
NEUTS SEG NFR BLD: 85 % (ref 32–75)
NEUTS SEG NFR BLD: 90 % (ref 32–75)
NITRITE UR QL STRIP.AUTO: NEGATIVE
NRBC # BLD: 0 K/UL (ref 0–0.01)
NRBC BLD-RTO: 0 PER 100 WBC
O2/TOTAL GAS SETTING VFR VENT: 30 %
P-R INTERVAL, ECG05: 144 MS
P-R INTERVAL, ECG05: 190 MS
PCO2 BLD: 47 MMHG (ref 35–45)
PH BLD: 7.4 [PH] (ref 7.35–7.45)
PH UR STRIP: 8.5 [PH] (ref 5–8)
PHOSPHATE SERPL-MCNC: 2.4 MG/DL (ref 2.6–4.7)
PHOSPHATE SERPL-MCNC: 2.5 MG/DL (ref 2.6–4.7)
PHOSPHATE SERPL-MCNC: 2.6 MG/DL (ref 2.6–4.7)
PHOSPHATE SERPL-MCNC: 3.9 MG/DL (ref 2.6–4.7)
PLATELET # BLD AUTO: 228 K/UL (ref 150–400)
PLATELET # BLD AUTO: 233 K/UL (ref 150–400)
PLATELET # BLD AUTO: 237 K/UL (ref 150–400)
PLATELET # BLD AUTO: 254 K/UL (ref 150–400)
PLATELET # BLD AUTO: 263 K/UL (ref 150–400)
PLATELET # BLD AUTO: 265 K/UL (ref 150–400)
PLATELET # BLD AUTO: 302 K/UL (ref 150–400)
PLATELET # BLD AUTO: 320 K/UL (ref 150–400)
PMV BLD AUTO: 9.5 FL (ref 8.9–12.9)
PMV BLD AUTO: 9.6 FL (ref 8.9–12.9)
PMV BLD AUTO: 9.7 FL (ref 8.9–12.9)
PMV BLD AUTO: 9.9 FL (ref 8.9–12.9)
PO2 BLD: 57 MMHG (ref 80–100)
POTASSIUM SERPL-SCNC: 2.7 MMOL/L (ref 3.5–5.1)
POTASSIUM SERPL-SCNC: 3.1 MMOL/L (ref 3.5–5.1)
POTASSIUM SERPL-SCNC: 3.2 MMOL/L (ref 3.5–5.1)
POTASSIUM SERPL-SCNC: 3.3 MMOL/L (ref 3.5–5.1)
POTASSIUM SERPL-SCNC: 3.4 MMOL/L (ref 3.5–5.1)
POTASSIUM SERPL-SCNC: 3.4 MMOL/L (ref 3.5–5.1)
POTASSIUM SERPL-SCNC: 3.5 MMOL/L (ref 3.5–5.1)
POTASSIUM SERPL-SCNC: 3.5 MMOL/L (ref 3.5–5.1)
POTASSIUM SERPL-SCNC: 3.6 MMOL/L (ref 3.5–5.1)
POTASSIUM SERPL-SCNC: 3.6 MMOL/L (ref 3.5–5.1)
POTASSIUM SERPL-SCNC: 3.7 MMOL/L (ref 3.5–5.1)
PROCALCITONIN SERPL-MCNC: <0.05 NG/ML
PROT SERPL-MCNC: 7.3 G/DL (ref 6.4–8.2)
PROT UR STRIP-MCNC: 30 MG/DL
PROTHROMBIN TIME: 11.5 SEC (ref 9–11.1)
Q-T INTERVAL, ECG07: 320 MS
Q-T INTERVAL, ECG07: 362 MS
QRS DURATION, ECG06: 64 MS
QRS DURATION, ECG06: 72 MS
QTC CALCULATION (BEZET), ECG08: 445 MS
QTC CALCULATION (BEZET), ECG08: 456 MS
RBC # BLD AUTO: 4.2 M/UL (ref 4.1–5.7)
RBC # BLD AUTO: 4.27 M/UL (ref 4.1–5.7)
RBC # BLD AUTO: 4.33 M/UL (ref 4.1–5.7)
RBC # BLD AUTO: 4.34 M/UL (ref 4.1–5.7)
RBC # BLD AUTO: 4.5 M/UL (ref 4.1–5.7)
RBC # BLD AUTO: 4.57 M/UL (ref 4.1–5.7)
RBC # BLD AUTO: 4.66 M/UL (ref 4.1–5.7)
RBC # BLD AUTO: 4.81 M/UL (ref 4.1–5.7)
RBC #/AREA URNS HPF: ABNORMAL /HPF (ref 0–5)
RBC MORPH BLD: ABNORMAL
SAMPLES BEING HELD,HOLD: NORMAL
SAO2 % BLD: 89 % (ref 92–97)
SARS-COV-2, COV2: NORMAL
SARS-COV-2, XPLCVT: NOT DETECTED
SERVICE CMNT-IMP: ABNORMAL
SERVICE CMNT-IMP: NORMAL
SODIUM SERPL-SCNC: 133 MMOL/L (ref 136–145)
SODIUM SERPL-SCNC: 142 MMOL/L (ref 136–145)
SODIUM SERPL-SCNC: 143 MMOL/L (ref 136–145)
SODIUM SERPL-SCNC: 143 MMOL/L (ref 136–145)
SODIUM SERPL-SCNC: 146 MMOL/L (ref 136–145)
SODIUM SERPL-SCNC: 146 MMOL/L (ref 136–145)
SODIUM SERPL-SCNC: 147 MMOL/L (ref 136–145)
SODIUM SERPL-SCNC: 147 MMOL/L (ref 136–145)
SODIUM SERPL-SCNC: 150 MMOL/L (ref 136–145)
SODIUM SERPL-SCNC: 152 MMOL/L (ref 136–145)
SODIUM SERPL-SCNC: 154 MMOL/L (ref 136–145)
SOURCE, COVRS: NORMAL
SP GR UR REFRACTOMETRY: 1
SPECIMEN TYPE: ABNORMAL
THERAPEUTIC RANGE,PTTT: ABNORMAL SECS (ref 58–77)
TOTAL RESP. RATE, ITRR: 31
TRIGL SERPL-MCNC: 82 MG/DL (ref ?–150)
TROPONIN I SERPL-MCNC: <0.05 NG/ML
UROBILINOGEN UR QL STRIP.AUTO: 0.2 EU/DL (ref 0.2–1)
VENTRICULAR RATE, ECG03: 122 BPM
VENTRICULAR RATE, ECG03: 91 BPM
VLDLC SERPL CALC-MCNC: 16.4 MG/DL
WBC # BLD AUTO: 11.2 K/UL (ref 4.1–11.1)
WBC # BLD AUTO: 11.8 K/UL (ref 4.1–11.1)
WBC # BLD AUTO: 12.4 K/UL (ref 4.1–11.1)
WBC # BLD AUTO: 13 K/UL (ref 4.1–11.1)
WBC # BLD AUTO: 14.1 K/UL (ref 4.1–11.1)
WBC # BLD AUTO: 15.7 K/UL (ref 4.1–11.1)
WBC # BLD AUTO: 21.3 K/UL (ref 4.1–11.1)
WBC # BLD AUTO: 21.6 K/UL (ref 4.1–11.1)
WBC URNS QL MICRO: ABNORMAL /HPF (ref 0–4)

## 2021-01-01 PROCEDURE — 85025 COMPLETE CBC W/AUTO DIFF WBC: CPT

## 2021-01-01 PROCEDURE — C9113 INJ PANTOPRAZOLE SODIUM, VIA: HCPCS | Performed by: PHYSICIAN ASSISTANT

## 2021-01-01 PROCEDURE — 84100 ASSAY OF PHOSPHORUS: CPT

## 2021-01-01 PROCEDURE — 74018 RADEX ABDOMEN 1 VIEW: CPT

## 2021-01-01 PROCEDURE — 80061 LIPID PANEL: CPT

## 2021-01-01 PROCEDURE — 92526 ORAL FUNCTION THERAPY: CPT | Performed by: SPEECH-LANGUAGE PATHOLOGIST

## 2021-01-01 PROCEDURE — 74011250636 HC RX REV CODE- 250/636: Performed by: INTERNAL MEDICINE

## 2021-01-01 PROCEDURE — 77030019988 HC FCPS ENDOSC DISP BSC -B: Performed by: INTERNAL MEDICINE

## 2021-01-01 PROCEDURE — 94762 N-INVAS EAR/PLS OXIMTRY CONT: CPT

## 2021-01-01 PROCEDURE — 74011250637 HC RX REV CODE- 250/637: Performed by: PSYCHIATRY & NEUROLOGY

## 2021-01-01 PROCEDURE — 65660000000 HC RM CCU STEPDOWN

## 2021-01-01 PROCEDURE — 74011250636 HC RX REV CODE- 250/636: Performed by: NURSE PRACTITIONER

## 2021-01-01 PROCEDURE — 80048 BASIC METABOLIC PNL TOTAL CA: CPT

## 2021-01-01 PROCEDURE — 84145 PROCALCITONIN (PCT): CPT

## 2021-01-01 PROCEDURE — 77010033678 HC OXYGEN DAILY

## 2021-01-01 PROCEDURE — 74011000636 HC RX REV CODE- 636: Performed by: RADIOLOGY

## 2021-01-01 PROCEDURE — 74011250636 HC RX REV CODE- 250/636: Performed by: FAMILY MEDICINE

## 2021-01-01 PROCEDURE — 74011250637 HC RX REV CODE- 250/637: Performed by: FAMILY MEDICINE

## 2021-01-01 PROCEDURE — 74011000250 HC RX REV CODE- 250: Performed by: PHYSICIAN ASSISTANT

## 2021-01-01 PROCEDURE — 2709999900 HC NON-CHARGEABLE SUPPLY: Performed by: INTERNAL MEDICINE

## 2021-01-01 PROCEDURE — 70498 CT ANGIOGRAPHY NECK: CPT

## 2021-01-01 PROCEDURE — 74011250636 HC RX REV CODE- 250/636: Performed by: EMERGENCY MEDICINE

## 2021-01-01 PROCEDURE — 97116 GAIT TRAINING THERAPY: CPT

## 2021-01-01 PROCEDURE — 83735 ASSAY OF MAGNESIUM: CPT

## 2021-01-01 PROCEDURE — 81001 URINALYSIS AUTO W/SCOPE: CPT

## 2021-01-01 PROCEDURE — 71045 X-RAY EXAM CHEST 1 VIEW: CPT

## 2021-01-01 PROCEDURE — 74011250637 HC RX REV CODE- 250/637: Performed by: NURSE PRACTITIONER

## 2021-01-01 PROCEDURE — 0042T CT CODE NEURO PERF W CBF: CPT

## 2021-01-01 PROCEDURE — 36415 COLL VENOUS BLD VENIPUNCTURE: CPT

## 2021-01-01 PROCEDURE — 97530 THERAPEUTIC ACTIVITIES: CPT

## 2021-01-01 PROCEDURE — 74011000250 HC RX REV CODE- 250: Performed by: FAMILY MEDICINE

## 2021-01-01 PROCEDURE — 93005 ELECTROCARDIOGRAM TRACING: CPT

## 2021-01-01 PROCEDURE — 94760 N-INVAS EAR/PLS OXIMETRY 1: CPT

## 2021-01-01 PROCEDURE — 74011250636 HC RX REV CODE- 250/636: Performed by: PHYSICIAN ASSISTANT

## 2021-01-01 PROCEDURE — 70450 CT HEAD/BRAIN W/O DYE: CPT

## 2021-01-01 PROCEDURE — 82962 GLUCOSE BLOOD TEST: CPT

## 2021-01-01 PROCEDURE — 84484 ASSAY OF TROPONIN QUANT: CPT

## 2021-01-01 PROCEDURE — 74011000250 HC RX REV CODE- 250: Performed by: STUDENT IN AN ORGANIZED HEALTH CARE EDUCATION/TRAINING PROGRAM

## 2021-01-01 PROCEDURE — 92610 EVALUATE SWALLOWING FUNCTION: CPT

## 2021-01-01 PROCEDURE — 74011250637 HC RX REV CODE- 250/637: Performed by: INTERNAL MEDICINE

## 2021-01-01 PROCEDURE — 74011000250 HC RX REV CODE- 250: Performed by: NURSE PRACTITIONER

## 2021-01-01 PROCEDURE — 74011250636 HC RX REV CODE- 250/636: Performed by: STUDENT IN AN ORGANIZED HEALTH CARE EDUCATION/TRAINING PROGRAM

## 2021-01-01 PROCEDURE — 97161 PT EVAL LOW COMPLEX 20 MIN: CPT

## 2021-01-01 PROCEDURE — 92526 ORAL FUNCTION THERAPY: CPT

## 2021-01-01 PROCEDURE — 93306 TTE W/DOPPLER COMPLETE: CPT

## 2021-01-01 PROCEDURE — 74011250636 HC RX REV CODE- 250/636

## 2021-01-01 PROCEDURE — 80053 COMPREHEN METABOLIC PANEL: CPT

## 2021-01-01 PROCEDURE — 96375 TX/PRO/DX INJ NEW DRUG ADDON: CPT

## 2021-01-01 PROCEDURE — 99223 1ST HOSP IP/OBS HIGH 75: CPT | Performed by: PSYCHIATRY & NEUROLOGY

## 2021-01-01 PROCEDURE — 99232 SBSQ HOSP IP/OBS MODERATE 35: CPT | Performed by: NURSE PRACTITIONER

## 2021-01-01 PROCEDURE — 96374 THER/PROPH/DIAG INJ IV PUSH: CPT

## 2021-01-01 PROCEDURE — U0005 INFEC AGEN DETEC AMPLI PROBE: HCPCS

## 2021-01-01 PROCEDURE — 36600 WITHDRAWAL OF ARTERIAL BLOOD: CPT

## 2021-01-01 PROCEDURE — 97535 SELF CARE MNGMENT TRAINING: CPT

## 2021-01-01 PROCEDURE — 99285 EMERGENCY DEPT VISIT HI MDM: CPT

## 2021-01-01 PROCEDURE — 83605 ASSAY OF LACTIC ACID: CPT

## 2021-01-01 PROCEDURE — 94640 AIRWAY INHALATION TREATMENT: CPT

## 2021-01-01 PROCEDURE — 93306 TTE W/DOPPLER COMPLETE: CPT | Performed by: SPECIALIST

## 2021-01-01 PROCEDURE — 85730 THROMBOPLASTIN TIME PARTIAL: CPT

## 2021-01-01 PROCEDURE — 87040 BLOOD CULTURE FOR BACTERIA: CPT

## 2021-01-01 PROCEDURE — 97166 OT EVAL MOD COMPLEX 45 MIN: CPT

## 2021-01-01 PROCEDURE — 76040000019: Performed by: INTERNAL MEDICINE

## 2021-01-01 PROCEDURE — 99223 1ST HOSP IP/OBS HIGH 75: CPT | Performed by: NURSE PRACTITIONER

## 2021-01-01 PROCEDURE — 85576 BLOOD PLATELET AGGREGATION: CPT

## 2021-01-01 PROCEDURE — 99233 SBSQ HOSP IP/OBS HIGH 50: CPT | Performed by: NURSE PRACTITIONER

## 2021-01-01 PROCEDURE — 70551 MRI BRAIN STEM W/O DYE: CPT

## 2021-01-01 PROCEDURE — APPNB45 APP NON BILLABLE 31-45 MINUTES: Performed by: NURSE PRACTITIONER

## 2021-01-01 PROCEDURE — 83036 HEMOGLOBIN GLYCOSYLATED A1C: CPT

## 2021-01-01 PROCEDURE — 88305 TISSUE EXAM BY PATHOLOGIST: CPT

## 2021-01-01 PROCEDURE — 82803 BLOOD GASES ANY COMBINATION: CPT

## 2021-01-01 RX ORDER — POTASSIUM CHLORIDE 14.9 MG/ML
10 INJECTION INTRAVENOUS
Status: COMPLETED | OUTPATIENT
Start: 2021-01-01 | End: 2021-01-01

## 2021-01-01 RX ORDER — DEXTROMETHORPHAN/PSEUDOEPHED 2.5-7.5/.8
1.2 DROPS ORAL
Status: DISCONTINUED | OUTPATIENT
Start: 2021-01-01 | End: 2021-01-01 | Stop reason: HOSPADM

## 2021-01-01 RX ORDER — MIRTAZAPINE 7.5 MG/1
7.5 TABLET, FILM COATED ORAL
COMMUNITY

## 2021-01-01 RX ORDER — FENTANYL CITRATE 50 UG/ML
25 INJECTION, SOLUTION INTRAMUSCULAR; INTRAVENOUS
Status: DISCONTINUED | OUTPATIENT
Start: 2021-01-01 | End: 2021-01-01 | Stop reason: HOSPADM

## 2021-01-01 RX ORDER — ASPIRIN 300 MG/1
150 SUPPOSITORY RECTAL DAILY
Status: DISCONTINUED | OUTPATIENT
Start: 2021-01-01 | End: 2021-01-01 | Stop reason: HOSPADM

## 2021-01-01 RX ORDER — PROPOFOL 10 MG/ML
100 INJECTION, EMULSION INTRAVENOUS
Status: DISCONTINUED | OUTPATIENT
Start: 2021-01-01 | End: 2021-01-01

## 2021-01-01 RX ORDER — OLANZAPINE 5 MG/1
5 TABLET, ORALLY DISINTEGRATING ORAL
Status: DISCONTINUED | OUTPATIENT
Start: 2021-01-01 | End: 2021-01-01 | Stop reason: HOSPADM

## 2021-01-01 RX ORDER — ASPIRIN 300 MG/1
150 SUPPOSITORY RECTAL
Status: COMPLETED | OUTPATIENT
Start: 2021-01-01 | End: 2021-01-01

## 2021-01-01 RX ORDER — ACETAMINOPHEN 650 MG/1
650 SUPPOSITORY RECTAL
Status: DISCONTINUED | OUTPATIENT
Start: 2021-01-01 | End: 2021-01-01

## 2021-01-01 RX ORDER — ONDANSETRON 2 MG/ML
4 INJECTION INTRAMUSCULAR; INTRAVENOUS
Status: COMPLETED | OUTPATIENT
Start: 2021-01-01 | End: 2021-01-01

## 2021-01-01 RX ORDER — GUAIFENESIN 100 MG/5ML
81 LIQUID (ML) ORAL DAILY
Status: DISCONTINUED | OUTPATIENT
Start: 2021-01-01 | End: 2021-01-01

## 2021-01-01 RX ORDER — METOPROLOL TARTRATE 5 MG/5ML
5 INJECTION INTRAVENOUS EVERY 8 HOURS
Status: DISCONTINUED | OUTPATIENT
Start: 2021-01-01 | End: 2021-01-01

## 2021-01-01 RX ORDER — SODIUM CHLORIDE, SODIUM LACTATE, POTASSIUM CHLORIDE, CALCIUM CHLORIDE 600; 310; 30; 20 MG/100ML; MG/100ML; MG/100ML; MG/100ML
75 INJECTION, SOLUTION INTRAVENOUS CONTINUOUS
Status: DISCONTINUED | OUTPATIENT
Start: 2021-01-01 | End: 2021-01-01

## 2021-01-01 RX ORDER — SODIUM CHLORIDE 9 MG/ML
75 INJECTION, SOLUTION INTRAVENOUS CONTINUOUS
Status: DISCONTINUED | OUTPATIENT
Start: 2021-01-01 | End: 2021-01-01 | Stop reason: HOSPADM

## 2021-01-01 RX ORDER — DONEPEZIL HYDROCHLORIDE 5 MG/1
5 TABLET, FILM COATED ORAL
Status: DISCONTINUED | OUTPATIENT
Start: 2021-01-01 | End: 2021-01-01 | Stop reason: HOSPADM

## 2021-01-01 RX ORDER — LORAZEPAM 2 MG/ML
1 INJECTION INTRAMUSCULAR
Status: DISCONTINUED | OUTPATIENT
Start: 2021-01-01 | End: 2021-01-01 | Stop reason: HOSPADM

## 2021-01-01 RX ORDER — ASPIRIN 300 MG/1
300 SUPPOSITORY RECTAL
Status: COMPLETED | OUTPATIENT
Start: 2021-01-01 | End: 2021-01-01

## 2021-01-01 RX ORDER — EPINEPHRINE 0.1 MG/ML
1 INJECTION INTRACARDIAC; INTRAVENOUS
Status: DISCONTINUED | OUTPATIENT
Start: 2021-01-01 | End: 2021-01-01 | Stop reason: HOSPADM

## 2021-01-01 RX ORDER — LABETALOL HYDROCHLORIDE 5 MG/ML
10 INJECTION, SOLUTION INTRAVENOUS ONCE
Status: COMPLETED | OUTPATIENT
Start: 2021-01-01 | End: 2021-01-01

## 2021-01-01 RX ORDER — MORPHINE SULFATE 2 MG/ML
INJECTION, SOLUTION INTRAMUSCULAR; INTRAVENOUS
Status: DISCONTINUED
Start: 2021-01-01 | End: 2021-01-01 | Stop reason: HOSPADM

## 2021-01-01 RX ORDER — ASPIRIN 300 MG/1
150 SUPPOSITORY RECTAL DAILY
Status: DISCONTINUED | OUTPATIENT
Start: 2021-01-01 | End: 2021-01-01

## 2021-01-01 RX ORDER — SODIUM CHLORIDE 0.9 % (FLUSH) 0.9 %
5-40 SYRINGE (ML) INJECTION EVERY 8 HOURS
Status: DISCONTINUED | OUTPATIENT
Start: 2021-01-01 | End: 2021-01-01 | Stop reason: HOSPADM

## 2021-01-01 RX ORDER — LABETALOL HYDROCHLORIDE 5 MG/ML
5 INJECTION, SOLUTION INTRAVENOUS
Status: DISCONTINUED | OUTPATIENT
Start: 2021-01-01 | End: 2021-01-01 | Stop reason: HOSPADM

## 2021-01-01 RX ORDER — SODIUM,POTASSIUM PHOSPHATES 280-250MG
2 POWDER IN PACKET (EA) ORAL ONCE
Status: COMPLETED | OUTPATIENT
Start: 2021-01-01 | End: 2021-01-01

## 2021-01-01 RX ORDER — ATROPINE SULFATE 0.1 MG/ML
0.5 INJECTION INTRAVENOUS
Status: DISCONTINUED | OUTPATIENT
Start: 2021-01-01 | End: 2021-01-01 | Stop reason: HOSPADM

## 2021-01-01 RX ORDER — MORPHINE SULFATE 2 MG/ML
2 INJECTION, SOLUTION INTRAMUSCULAR; INTRAVENOUS
Status: DISCONTINUED | OUTPATIENT
Start: 2021-01-01 | End: 2021-01-01 | Stop reason: HOSPADM

## 2021-01-01 RX ORDER — SUCRALFATE 1 G/1
1 TABLET ORAL
Status: DISCONTINUED | OUTPATIENT
Start: 2021-01-01 | End: 2021-01-01 | Stop reason: HOSPADM

## 2021-01-01 RX ORDER — ACETAMINOPHEN 650 MG/1
650 SUPPOSITORY RECTAL
Status: DISCONTINUED | OUTPATIENT
Start: 2021-01-01 | End: 2021-01-01 | Stop reason: HOSPADM

## 2021-01-01 RX ORDER — FLUMAZENIL 0.1 MG/ML
0.2 INJECTION INTRAVENOUS
Status: DISCONTINUED | OUTPATIENT
Start: 2021-01-01 | End: 2021-01-01 | Stop reason: HOSPADM

## 2021-01-01 RX ORDER — SODIUM CHLORIDE 0.9 % (FLUSH) 0.9 %
5-40 SYRINGE (ML) INJECTION AS NEEDED
Status: DISCONTINUED | OUTPATIENT
Start: 2021-01-01 | End: 2021-01-01 | Stop reason: HOSPADM

## 2021-01-01 RX ORDER — ASPIRIN 300 MG/1
300 SUPPOSITORY RECTAL
Status: DISCONTINUED | OUTPATIENT
Start: 2021-01-01 | End: 2021-01-01

## 2021-01-01 RX ORDER — ASPIRIN 325 MG
325 TABLET ORAL
Status: COMPLETED | OUTPATIENT
Start: 2021-01-01 | End: 2021-01-01

## 2021-01-01 RX ORDER — METOPROLOL SUCCINATE 50 MG/1
50 TABLET, EXTENDED RELEASE ORAL DAILY
Status: DISCONTINUED | OUTPATIENT
Start: 2021-01-01 | End: 2021-01-01 | Stop reason: HOSPADM

## 2021-01-01 RX ORDER — KETAMINE HYDROCHLORIDE 50 MG/ML
100 INJECTION, SOLUTION INTRAMUSCULAR; INTRAVENOUS
Status: DISCONTINUED | OUTPATIENT
Start: 2021-01-01 | End: 2021-01-01

## 2021-01-01 RX ORDER — HALOPERIDOL 5 MG/ML
2 INJECTION INTRAMUSCULAR ONCE
Status: COMPLETED | OUTPATIENT
Start: 2021-01-01 | End: 2021-01-01

## 2021-01-01 RX ORDER — ACETAMINOPHEN 325 MG/1
650 TABLET ORAL
Status: DISCONTINUED | OUTPATIENT
Start: 2021-01-01 | End: 2021-01-01 | Stop reason: HOSPADM

## 2021-01-01 RX ORDER — MORPHINE SULFATE 2 MG/ML
2 INJECTION, SOLUTION INTRAMUSCULAR; INTRAVENOUS ONCE
Status: COMPLETED | OUTPATIENT
Start: 2021-01-01 | End: 2021-01-01

## 2021-01-01 RX ORDER — ATORVASTATIN CALCIUM 40 MG/1
40 TABLET, FILM COATED ORAL
Status: DISCONTINUED | OUTPATIENT
Start: 2021-01-01 | End: 2021-01-01 | Stop reason: HOSPADM

## 2021-01-01 RX ORDER — IPRATROPIUM BROMIDE AND ALBUTEROL SULFATE 2.5; .5 MG/3ML; MG/3ML
3 SOLUTION RESPIRATORY (INHALATION)
Status: COMPLETED | OUTPATIENT
Start: 2021-01-01 | End: 2021-01-01

## 2021-01-01 RX ORDER — GUAIFENESIN 100 MG/5ML
81 LIQUID (ML) ORAL DAILY
Status: DISCONTINUED | OUTPATIENT
Start: 2021-01-01 | End: 2021-01-01 | Stop reason: HOSPADM

## 2021-01-01 RX ORDER — LABETALOL HYDROCHLORIDE 5 MG/ML
INJECTION, SOLUTION INTRAVENOUS
Status: DISPENSED
Start: 2021-01-01 | End: 2021-01-01

## 2021-01-01 RX ORDER — POLYETHYLENE GLYCOL 3350 17 G/17G
17 POWDER, FOR SOLUTION ORAL DAILY PRN
Status: DISCONTINUED | OUTPATIENT
Start: 2021-01-01 | End: 2021-01-01 | Stop reason: HOSPADM

## 2021-01-01 RX ORDER — ONDANSETRON 2 MG/ML
4 INJECTION INTRAMUSCULAR; INTRAVENOUS
Status: DISCONTINUED | OUTPATIENT
Start: 2021-01-01 | End: 2021-01-01 | Stop reason: HOSPADM

## 2021-01-01 RX ORDER — MIDAZOLAM HYDROCHLORIDE 1 MG/ML
.25-5 INJECTION, SOLUTION INTRAMUSCULAR; INTRAVENOUS
Status: DISCONTINUED | OUTPATIENT
Start: 2021-01-01 | End: 2021-01-01 | Stop reason: HOSPADM

## 2021-01-01 RX ORDER — DEXTROSE MONOHYDRATE 50 MG/ML
100 INJECTION, SOLUTION INTRAVENOUS CONTINUOUS
Status: DISCONTINUED | OUTPATIENT
Start: 2021-01-01 | End: 2021-01-01 | Stop reason: HOSPADM

## 2021-01-01 RX ORDER — NALOXONE HYDROCHLORIDE 0.4 MG/ML
0.4 INJECTION, SOLUTION INTRAMUSCULAR; INTRAVENOUS; SUBCUTANEOUS
Status: DISCONTINUED | OUTPATIENT
Start: 2021-01-01 | End: 2021-01-01 | Stop reason: HOSPADM

## 2021-01-01 RX ORDER — DONEPEZIL HYDROCHLORIDE 5 MG/1
5 TABLET, FILM COATED ORAL
COMMUNITY

## 2021-01-01 RX ORDER — ATORVASTATIN CALCIUM 10 MG/1
10 TABLET, FILM COATED ORAL DAILY
Status: DISCONTINUED | OUTPATIENT
Start: 2021-01-01 | End: 2021-01-01

## 2021-01-01 RX ORDER — ENOXAPARIN SODIUM 100 MG/ML
40 INJECTION SUBCUTANEOUS DAILY
Status: DISCONTINUED | OUTPATIENT
Start: 2021-01-01 | End: 2021-01-01 | Stop reason: HOSPADM

## 2021-01-01 RX ORDER — SERTRALINE HYDROCHLORIDE 50 MG/1
50 TABLET, FILM COATED ORAL DAILY
Status: DISCONTINUED | OUTPATIENT
Start: 2021-01-01 | End: 2021-01-01 | Stop reason: HOSPADM

## 2021-01-01 RX ORDER — PANTOPRAZOLE SODIUM 40 MG/1
40 TABLET, DELAYED RELEASE ORAL
Status: DISCONTINUED | OUTPATIENT
Start: 2021-01-01 | End: 2021-01-01

## 2021-01-01 RX ORDER — MORPHINE SULFATE 2 MG/ML
1 INJECTION, SOLUTION INTRAMUSCULAR; INTRAVENOUS ONCE
Status: COMPLETED | OUTPATIENT
Start: 2021-01-01 | End: 2021-01-01

## 2021-01-01 RX ORDER — ACETAMINOPHEN 325 MG/1
650 TABLET ORAL
Status: DISCONTINUED | OUTPATIENT
Start: 2021-01-01 | End: 2021-01-01

## 2021-01-01 RX ORDER — PROMETHAZINE HYDROCHLORIDE 25 MG/1
12.5 TABLET ORAL
Status: DISCONTINUED | OUTPATIENT
Start: 2021-01-01 | End: 2021-01-01 | Stop reason: HOSPADM

## 2021-01-01 RX ORDER — MIDAZOLAM HYDROCHLORIDE 1 MG/ML
INJECTION, SOLUTION INTRAMUSCULAR; INTRAVENOUS
Status: COMPLETED
Start: 2021-01-01 | End: 2021-01-01

## 2021-01-01 RX ADMIN — Medication 10 ML: at 14:20

## 2021-01-01 RX ADMIN — MORPHINE SULFATE 2 MG: 2 INJECTION, SOLUTION INTRAMUSCULAR; INTRAVENOUS at 12:39

## 2021-01-01 RX ADMIN — Medication 10 ML: at 23:25

## 2021-01-01 RX ADMIN — PANTOPRAZOLE SODIUM 40 MG: 40 INJECTION, POWDER, FOR SOLUTION INTRAVENOUS at 13:20

## 2021-01-01 RX ADMIN — IOPAMIDOL 40 ML: 755 INJECTION, SOLUTION INTRAVENOUS at 15:24

## 2021-01-01 RX ADMIN — ATORVASTATIN CALCIUM 40 MG: 40 TABLET, FILM COATED ORAL at 21:15

## 2021-01-01 RX ADMIN — ATORVASTATIN CALCIUM 40 MG: 40 TABLET, FILM COATED ORAL at 21:31

## 2021-01-01 RX ADMIN — ACETAMINOPHEN 650 MG: 650 SUPPOSITORY RECTAL at 02:44

## 2021-01-01 RX ADMIN — PANTOPRAZOLE SODIUM 40 MG: 40 INJECTION, POWDER, FOR SOLUTION INTRAVENOUS at 09:04

## 2021-01-01 RX ADMIN — METOPROLOL TARTRATE 5 MG: 5 INJECTION INTRAVENOUS at 14:00

## 2021-01-01 RX ADMIN — SODIUM CHLORIDE, POTASSIUM CHLORIDE, SODIUM LACTATE AND CALCIUM CHLORIDE 75 ML/HR: 600; 310; 30; 20 INJECTION, SOLUTION INTRAVENOUS at 08:29

## 2021-01-01 RX ADMIN — POTASSIUM & SODIUM PHOSPHATES POWDER PACK 280-160-250 MG 2 PACKET: 280-160-250 PACK at 10:24

## 2021-01-01 RX ADMIN — SUCRALFATE 1 G: 1 TABLET ORAL at 06:57

## 2021-01-01 RX ADMIN — PANTOPRAZOLE SODIUM 40 MG: 40 INJECTION, POWDER, FOR SOLUTION INTRAVENOUS at 20:30

## 2021-01-01 RX ADMIN — POTASSIUM CHLORIDE 10 MEQ: 14.9 INJECTION, SOLUTION INTRAVENOUS at 20:21

## 2021-01-01 RX ADMIN — Medication 10 ML: at 13:16

## 2021-01-01 RX ADMIN — IOPAMIDOL 80 ML: 755 INJECTION, SOLUTION INTRAVENOUS at 15:23

## 2021-01-01 RX ADMIN — SODIUM CHLORIDE, POTASSIUM CHLORIDE, SODIUM LACTATE AND CALCIUM CHLORIDE 75 ML/HR: 600; 310; 30; 20 INJECTION, SOLUTION INTRAVENOUS at 13:20

## 2021-01-01 RX ADMIN — OLANZAPINE 5 MG: 5 TABLET, ORALLY DISINTEGRATING ORAL at 21:31

## 2021-01-01 RX ADMIN — IPRATROPIUM BROMIDE AND ALBUTEROL SULFATE 3 ML: .5; 3 SOLUTION RESPIRATORY (INHALATION) at 02:43

## 2021-01-01 RX ADMIN — POTASSIUM CHLORIDE 10 MEQ: 14.9 INJECTION, SOLUTION INTRAVENOUS at 22:26

## 2021-01-01 RX ADMIN — PANTOPRAZOLE SODIUM 40 MG: 40 INJECTION, POWDER, FOR SOLUTION INTRAVENOUS at 21:03

## 2021-01-01 RX ADMIN — METOPROLOL TARTRATE 5 MG: 5 INJECTION INTRAVENOUS at 05:44

## 2021-01-01 RX ADMIN — Medication 10 ML: at 08:33

## 2021-01-01 RX ADMIN — PANTOPRAZOLE SODIUM 40 MG: 40 INJECTION, POWDER, FOR SOLUTION INTRAVENOUS at 08:03

## 2021-01-01 RX ADMIN — SERTRALINE 50 MG: 50 TABLET, FILM COATED ORAL at 08:30

## 2021-01-01 RX ADMIN — ASPIRIN 150 MG: 300 SUPPOSITORY RECTAL at 21:11

## 2021-01-01 RX ADMIN — OLANZAPINE 5 MG: 5 TABLET, ORALLY DISINTEGRATING ORAL at 21:15

## 2021-01-01 RX ADMIN — DONEPEZIL HYDROCHLORIDE 5 MG: 5 TABLET, FILM COATED ORAL at 21:10

## 2021-01-01 RX ADMIN — PANTOPRAZOLE SODIUM 40 MG: 40 INJECTION, POWDER, FOR SOLUTION INTRAVENOUS at 08:28

## 2021-01-01 RX ADMIN — LORAZEPAM 1 MG: 2 INJECTION INTRAMUSCULAR; INTRAVENOUS at 16:12

## 2021-01-01 RX ADMIN — Medication 10 ML: at 22:29

## 2021-01-01 RX ADMIN — LORAZEPAM 1 MG: 2 INJECTION INTRAMUSCULAR; INTRAVENOUS at 02:36

## 2021-01-01 RX ADMIN — METOPROLOL TARTRATE 5 MG: 5 INJECTION INTRAVENOUS at 13:39

## 2021-01-01 RX ADMIN — SUCRALFATE 1 G: 1 TABLET ORAL at 21:15

## 2021-01-01 RX ADMIN — ASPIRIN 300 MG: 300 SUPPOSITORY RECTAL at 23:28

## 2021-01-01 RX ADMIN — HALOPERIDOL LACTATE 2 MG: 5 INJECTION, SOLUTION INTRAMUSCULAR at 00:13

## 2021-01-01 RX ADMIN — POTASSIUM CHLORIDE 10 MEQ: 14.9 INJECTION, SOLUTION INTRAVENOUS at 21:25

## 2021-01-01 RX ADMIN — DONEPEZIL HYDROCHLORIDE 5 MG: 5 TABLET, FILM COATED ORAL at 21:31

## 2021-01-01 RX ADMIN — SODIUM CHLORIDE, POTASSIUM CHLORIDE, SODIUM LACTATE AND CALCIUM CHLORIDE 75 ML/HR: 600; 310; 30; 20 INJECTION, SOLUTION INTRAVENOUS at 20:22

## 2021-01-01 RX ADMIN — DEXTROSE MONOHYDRATE 100 ML/HR: 5 INJECTION, SOLUTION INTRAVENOUS at 08:03

## 2021-01-01 RX ADMIN — MIDAZOLAM HYDROCHLORIDE 5 MG: 1 INJECTION, SOLUTION INTRAMUSCULAR; INTRAVENOUS at 11:09

## 2021-01-01 RX ADMIN — Medication 10 ML: at 05:16

## 2021-01-01 RX ADMIN — POTASSIUM CHLORIDE 10 MEQ: 14.9 INJECTION, SOLUTION INTRAVENOUS at 15:17

## 2021-01-01 RX ADMIN — Medication 10 ML: at 22:40

## 2021-01-01 RX ADMIN — OLANZAPINE 5 MG: 5 TABLET, ORALLY DISINTEGRATING ORAL at 21:10

## 2021-01-01 RX ADMIN — PANTOPRAZOLE SODIUM 40 MG: 40 INJECTION, POWDER, FOR SOLUTION INTRAVENOUS at 08:30

## 2021-01-01 RX ADMIN — LABETALOL HYDROCHLORIDE: 5 INJECTION INTRAVENOUS at 18:09

## 2021-01-01 RX ADMIN — POTASSIUM CHLORIDE 10 MEQ: 14.9 INJECTION, SOLUTION INTRAVENOUS at 19:11

## 2021-01-01 RX ADMIN — METOPROLOL TARTRATE 5 MG: 5 INJECTION INTRAVENOUS at 22:57

## 2021-01-01 RX ADMIN — PANTOPRAZOLE SODIUM 40 MG: 40 INJECTION, POWDER, FOR SOLUTION INTRAVENOUS at 21:37

## 2021-01-01 RX ADMIN — POTASSIUM & SODIUM PHOSPHATES POWDER PACK 280-160-250 MG 2 PACKET: 280-160-250 PACK at 08:30

## 2021-01-01 RX ADMIN — DEXTROSE MONOHYDRATE 100 ML/HR: 5 INJECTION, SOLUTION INTRAVENOUS at 21:01

## 2021-01-01 RX ADMIN — Medication 10 ML: at 06:03

## 2021-01-01 RX ADMIN — PANTOPRAZOLE SODIUM 40 MG: 40 INJECTION, POWDER, FOR SOLUTION INTRAVENOUS at 22:55

## 2021-01-01 RX ADMIN — METOPROLOL TARTRATE 5 MG: 5 INJECTION INTRAVENOUS at 13:53

## 2021-01-01 RX ADMIN — METOPROLOL TARTRATE 5 MG: 5 INJECTION INTRAVENOUS at 23:24

## 2021-01-01 RX ADMIN — ANTACID/ANTIFLATULENT 4 G: 380; 550; 10; 10 GRANULE, EFFERVESCENT ORAL at 08:25

## 2021-01-01 RX ADMIN — LORAZEPAM 1 MG: 2 INJECTION INTRAMUSCULAR; INTRAVENOUS at 12:14

## 2021-01-01 RX ADMIN — GLUCAGON HYDROCHLORIDE 1 MG: 1 INJECTION, POWDER, FOR SOLUTION INTRAMUSCULAR; INTRAVENOUS; SUBCUTANEOUS at 08:45

## 2021-01-01 RX ADMIN — Medication 10 ML: at 21:38

## 2021-01-01 RX ADMIN — MORPHINE SULFATE 1 MG: 2 INJECTION, SOLUTION INTRAMUSCULAR; INTRAVENOUS at 01:41

## 2021-01-01 RX ADMIN — ASPIRIN 81 MG: 81 TABLET, CHEWABLE ORAL at 08:30

## 2021-01-01 RX ADMIN — SUCRALFATE 1 G: 1 TABLET ORAL at 21:10

## 2021-01-01 RX ADMIN — Medication 10 ML: at 21:15

## 2021-01-01 RX ADMIN — LORAZEPAM 1 MG: 2 INJECTION INTRAMUSCULAR; INTRAVENOUS at 20:30

## 2021-01-01 RX ADMIN — ACETAMINOPHEN 650 MG: 650 SUPPOSITORY RECTAL at 22:44

## 2021-01-01 RX ADMIN — POTASSIUM CHLORIDE 10 MEQ: 14.9 INJECTION, SOLUTION INTRAVENOUS at 14:25

## 2021-01-01 RX ADMIN — FENTANYL CITRATE 50 MCG: 50 INJECTION, SOLUTION INTRAMUSCULAR; INTRAVENOUS at 11:09

## 2021-01-01 RX ADMIN — MORPHINE SULFATE 2 MG: 2 INJECTION, SOLUTION INTRAMUSCULAR; INTRAVENOUS at 12:14

## 2021-01-01 RX ADMIN — Medication 10 ML: at 05:50

## 2021-01-01 RX ADMIN — SODIUM CHLORIDE 1000 ML: 9 INJECTION, SOLUTION INTRAVENOUS at 01:41

## 2021-01-01 RX ADMIN — SODIUM CHLORIDE, POTASSIUM CHLORIDE, SODIUM LACTATE AND CALCIUM CHLORIDE 75 ML/HR: 600; 310; 30; 20 INJECTION, SOLUTION INTRAVENOUS at 20:19

## 2021-01-01 RX ADMIN — Medication 10 ML: at 16:12

## 2021-01-01 RX ADMIN — POTASSIUM CHLORIDE 10 MEQ: 14.9 INJECTION, SOLUTION INTRAVENOUS at 13:27

## 2021-01-01 RX ADMIN — METOPROLOL TARTRATE 5 MG: 5 INJECTION INTRAVENOUS at 06:01

## 2021-01-01 RX ADMIN — Medication 10 ML: at 14:26

## 2021-01-01 RX ADMIN — SUCRALFATE 1 G: 1 TABLET ORAL at 16:04

## 2021-01-01 RX ADMIN — Medication 10 ML: at 06:36

## 2021-01-01 RX ADMIN — SUCRALFATE 1 G: 1 TABLET ORAL at 10:00

## 2021-01-01 RX ADMIN — METOPROLOL TARTRATE 5 MG: 5 INJECTION INTRAVENOUS at 21:03

## 2021-01-01 RX ADMIN — METOPROLOL TARTRATE 5 MG: 5 INJECTION INTRAVENOUS at 06:57

## 2021-01-01 RX ADMIN — Medication 10 ML: at 15:27

## 2021-01-01 RX ADMIN — ASPIRIN 81 MG: 81 TABLET, CHEWABLE ORAL at 08:03

## 2021-01-01 RX ADMIN — SUCRALFATE 1 G: 1 TABLET ORAL at 21:31

## 2021-01-01 RX ADMIN — SUCRALFATE 1 G: 1 TABLET ORAL at 12:28

## 2021-01-01 RX ADMIN — PANTOPRAZOLE SODIUM 40 MG: 40 INJECTION, POWDER, FOR SOLUTION INTRAVENOUS at 21:15

## 2021-01-01 RX ADMIN — LORAZEPAM 1 MG: 2 INJECTION INTRAMUSCULAR; INTRAVENOUS at 11:23

## 2021-01-01 RX ADMIN — LABETALOL HYDROCHLORIDE 10 MG: 5 INJECTION INTRAVENOUS at 06:52

## 2021-01-01 RX ADMIN — Medication 10 ML: at 06:58

## 2021-01-01 RX ADMIN — SUCRALFATE 1 G: 1 TABLET ORAL at 15:28

## 2021-01-01 RX ADMIN — Medication 10 ML: at 22:56

## 2021-01-01 RX ADMIN — ONDANSETRON 4 MG: 2 INJECTION INTRAMUSCULAR; INTRAVENOUS at 08:45

## 2021-01-01 RX ADMIN — METOPROLOL TARTRATE 5 MG: 5 INJECTION INTRAVENOUS at 15:26

## 2021-01-01 RX ADMIN — METOPROLOL TARTRATE 5 MG: 5 INJECTION INTRAVENOUS at 05:43

## 2021-01-01 RX ADMIN — METOPROLOL TARTRATE 5 MG: 5 INJECTION INTRAVENOUS at 21:37

## 2021-01-01 RX ADMIN — Medication 10 ML: at 21:10

## 2021-01-01 RX ADMIN — SUCRALFATE 1 G: 1 TABLET ORAL at 11:52

## 2021-01-01 RX ADMIN — PANTOPRAZOLE SODIUM 40 MG: 40 INJECTION, POWDER, FOR SOLUTION INTRAVENOUS at 22:00

## 2021-01-01 RX ADMIN — METOPROLOL TARTRATE 5 MG: 5 INJECTION INTRAVENOUS at 21:14

## 2021-01-01 RX ADMIN — Medication 10 ML: at 14:00

## 2021-01-01 RX ADMIN — LABETALOL HYDROCHLORIDE 5 MG: 5 INJECTION INTRAVENOUS at 11:35

## 2021-01-01 RX ADMIN — Medication 10 ML: at 06:52

## 2021-01-01 RX ADMIN — POTASSIUM BICARBONATE 40 MEQ: 391 TABLET, EFFERVESCENT ORAL at 17:57

## 2021-01-01 RX ADMIN — OLANZAPINE 5 MG: 5 TABLET, ORALLY DISINTEGRATING ORAL at 23:29

## 2021-01-01 RX ADMIN — LABETALOL HYDROCHLORIDE 5 MG: 5 INJECTION INTRAVENOUS at 05:16

## 2021-01-01 RX ADMIN — PANTOPRAZOLE SODIUM 40 MG: 40 INJECTION, POWDER, FOR SOLUTION INTRAVENOUS at 23:24

## 2021-01-01 RX ADMIN — METOPROLOL TARTRATE 5 MG: 5 INJECTION INTRAVENOUS at 14:17

## 2021-01-01 RX ADMIN — METOPROLOL TARTRATE 5 MG: 5 INJECTION INTRAVENOUS at 06:36

## 2021-01-01 RX ADMIN — SERTRALINE 50 MG: 50 TABLET, FILM COATED ORAL at 08:04

## 2021-01-01 RX ADMIN — MORPHINE SULFATE 2 MG: 2 INJECTION, SOLUTION INTRAMUSCULAR; INTRAVENOUS at 07:34

## 2021-01-01 RX ADMIN — DEXTROSE MONOHYDRATE 100 ML/HR: 5 INJECTION, SOLUTION INTRAVENOUS at 18:38

## 2021-01-01 RX ADMIN — POTASSIUM BICARBONATE 40 MEQ: 391 TABLET, EFFERVESCENT ORAL at 10:24

## 2021-01-01 RX ADMIN — DEXTROSE MONOHYDRATE 100 ML/HR: 5 INJECTION, SOLUTION INTRAVENOUS at 08:47

## 2021-01-01 RX ADMIN — POTASSIUM CHLORIDE 10 MEQ: 14.9 INJECTION, SOLUTION INTRAVENOUS at 16:14

## 2021-01-01 RX ADMIN — PANTOPRAZOLE SODIUM 40 MG: 40 INJECTION, POWDER, FOR SOLUTION INTRAVENOUS at 10:46

## 2021-01-01 RX ADMIN — MORPHINE SULFATE 2 MG: 2 INJECTION, SOLUTION INTRAMUSCULAR; INTRAVENOUS at 10:41

## 2021-01-01 RX ADMIN — SUCRALFATE 1 G: 1 TABLET ORAL at 06:50

## 2021-01-01 RX ADMIN — Medication 10 ML: at 14:25

## 2021-01-01 RX ADMIN — PANTOPRAZOLE SODIUM 40 MG: 40 INJECTION, POWDER, FOR SOLUTION INTRAVENOUS at 08:51

## 2021-01-01 RX ADMIN — LABETALOL HYDROCHLORIDE 5 MG: 5 INJECTION INTRAVENOUS at 17:38

## 2021-01-01 RX ADMIN — Medication 10 ML: at 05:49

## 2021-01-01 RX ADMIN — ATORVASTATIN CALCIUM 40 MG: 40 TABLET, FILM COATED ORAL at 21:10

## 2021-02-21 PROBLEM — I63.9 CVA (CEREBRAL VASCULAR ACCIDENT) (HCC): Status: ACTIVE | Noted: 2021-01-01

## 2021-02-21 NOTE — ED PROVIDER NOTES
HPI  
 
  [de-identified] M here with L facial droop. Not entirely sure when it started - he thinks in the past few days. Also was having trouble swallowing for the past 2 days. Had EGD today at [facility] and a foreign body was removed but sx's persisting. No mention of facial droop earlier today but family noticed once he got home. Denies focal weakness or numbness. Past Medical History:  
Diagnosis Date  Arthritis  Hearing decreased   
 getting worse per spouse  High blood pressure  Melanoma in situ of left shoulder ([facility] [address] 1975  Prostate atrophy   
 takes finesteride Past Surgical History:  
Procedure Laterality Date  HX ROTATOR CUFF REPAIR Right 2011  UPPER GI ENDOSCOPY,BIOPSY  2/21/2021 No family history on file. Social History Socioeconomic History  Marital status:  Spouse name: Not on file  Number of children: Not on file  Years of education: Not on file  Highest education level: Not on file Occupational History  Not on file Social Needs  Financial resource strain: Not on file  Food insecurity Worry: Not on file Inability: Not on file  Transportation needs Medical: Not on file Non-medical: Not on file Tobacco Use  Smoking status: Never Smoker  Smokeless tobacco: Never Used Substance and Sexual Activity  Alcohol use: Not on file Comment: seldom  Drug use: Not on file  Sexual activity: Not on file Lifestyle  Physical activity Days per week: Not on file Minutes per session: Not on file  Stress: Not on file Relationships  Social connections Talks on phone: Not on file Gets together: Not on file Attends [employer] service: Not on file Active member of club or organization: Not on file Attends meetings of clubs or organizations: Not on file Relationship status: Not on file  Intimate partner violence Fear of current or ex partner: Not on file Emotionally abused: Not on file Physically abused: Not on file Forced sexual activity: Not on file Other Topics Concern  Not on file Social History Narrative  Not on file ALLERGIES: Patient has no known allergies. Review of Systems Review of Systems Constitutional: (-) weight loss. HEENT: (-) stiff neck Eyes: (-) discharge. Respiratory: (-) cough. Cardiovascular: (-) syncope. Gastrointestinal: (-) blood in stool. Genitourinary: (-) hematuria. Musculoskeletal: (-) myalgias. Neurological: (-) seizure. Skin: (-) petechiae Lymph/Immunologic: (-) enlarged lymph nodes All other systems reviewed and are negative. Vitals:  
 02/21/21 1531 BP: (!) 169/83 Resp: 16 Temp: 99.6 °F (37.6 °C) SpO2: 93% Weight: 61.4 kg (135 lb 5.8 oz) Height: 5' 9\" (1.753 m) Physical Exam Nursing note and vitals reviewed. Constitutional: oriented to person, place, and time. appears well-developed and well-nourished. No distress. Head: Normocephalic and atraumatic. Sclera anicteric Nose: No rhinorrhea Mouth/Throat: Oropharynx is clear and moist. Pharynx normal 
Eyes: Conjunctivae are normal. Pupils are equal, round, and reactive to light. Right eye exhibits no discharge. Left eye exhibits no discharge. Neck: Painless normal range of motion. Neck supple. No LAD. Cardiovascular: Normal rate, regular rhythm, normal heart sounds and intact distal pulses. Exam reveals no gallop and no friction rub. No murmur heard. Pulmonary/Chest:  No respiratory distress. No wheezes. No rales. No rhonchi. No increased work of breathing. No accessory muscle use. Good air exchange throughout. Abdominal: soft, non-tender, no rebound or guarding. No hepatosplenomegaly. Normal bowel sounds throughout. Back: no tenderness to palpation, no deformities, no CVA tenderness Extremities/Musculoskeletal: Normal range of motion. no tenderness. No edema.  Distal extremities are neurovasc intact. Lymphadenopathy:   No adenopathy. Neurological:  CN II-XII intact aside from L facial droop, 5/5 strength throughout, nl sensation throughout, nl cerebellar function, nl speech/fluency, no pronator drift. Normal mental status. Skin: Skin is warm and dry. No rash noted. No pallor. MDM [de-identified] M here with L facial droop and trouble swallowing. Unclear onset but sounds like a few days per patient. Made level 2 code stroke on arrival. 
  
 
Procedures ED EKG interpretation: 
Rhythm: normal sinus rhythm; and regular . Rate (approx.): 91; Axis: normal; P wave: normal; QRS interval: normal ; ST/T wave: normal;  This EKG was interpreted by Leighton Shaikh MD,ED Provider. Perfect Serve Consult for Admission 4:41 PM 
 
ED Room Number: XS93/62 Patient Name and age:  Lacey Cisneros [de-identified] y.o.  male Working Diagnosis: 1. Facial droop 2. Dysphagia, unspecified type 3. Hypokalemia COVID-19 Suspicion:  no 
Sepsis present:  no  Reassessment needed: no 
Code Status:  Full Code Readmission: no 
Isolation Requirements:  no 
Recommended Level of Care:  med/surg Department:[facility] Adult ED - [phone] Other:  [de-identified] M here with L facial droop and trouble swallowing. Seems like ongoing x 2 days. Having trouble swallowing - had EGD earlier today at [facility] with foreign body but sx's persist. Family noticed facial droop today once back from [facility] but pt says it has been there for 2 days. CT ok. Admit for workup per teleneuro. Also hypokalemic.

## 2021-02-21 NOTE — H&P
[facility] Adult  Hospitalist Group History and Physical 
 
Primary Care Provider: Other, MD Jennifer 
Date of Service:  2/21/2021 Subjective:  
 
Venus Alaniz is a [de-identified] y.o. male who presents for evaluation of facial droop. Patient is somewhat of a poor historian. He states that he has been having trouble swallowing for over a month. Unclear what kind of diet he has been able to eat and what he can. Cannot specify that and patient just saying \" I can swallow\". Patient also reports some problem with his balance since his swallowing problem started. He was at [facility] earlier and had EGD done for his dysphagia and per report there was no obstruction or foreign body or stricture or ulcer. There was minimal erythema to suggest possible esophagitis. Biopsies were obtained. Patient was sent back to home and advised to continue on PPI. At home, patient's son noticed left facial droop and that led to the patient being evaluated in the ER here today. Patient does not know whether he has had facial droop but per report. In the ER code stroke was called, initial CT code neuro and CTA were unremarkable. Patient was evaluated by neurology and deemed not a TPA candidate. Hospitalist service requested to admit the patient for further evaluation management. Review of Systems: A comprehensive review of systems was negative except for that written in the History of Present Illness. Past Medical History:  
Diagnosis Date  Arthritis  Hearing decreased   
 getting worse per spouse  High blood pressure  Melanoma in situ of left shoulder ([facility] [place] [address] 1975  Prostate atrophy   
 takes finesteride Past Surgical History:  
Procedure Laterality Date  HX ROTATOR CUFF REPAIR Right 2011  UPPER GI ENDOSCOPY,BIOPSY  2/21/2021 Prior to Admission medications Medication Sig Start Date End Date Taking?  Authorizing Provider  
clotrimazole (LOTRIMIN) 1 % topical cream Apply to affected area two (2) times a day. 4/2/20   Angeles Hinkle MD  
ondansetron (ZOFRAN ODT) 4 mg disintegrating tablet Take 1 Tab by mouth every eight (8) hours as needed for Nausea or Vomiting. 4/2/20   Angeles Hinkle MD  
miscellaneous medical supply misc Dtap for Pertusis, [place] and diptheria 7/18/19   Danni Love MD  
sertraline (ZOLOFT) 50 mg tablet Take 1 Tab by mouth daily. For anxiety 7/18/19   Danni Love MD  
atorvastatin (LIPITOR) 10 mg tablet Take 1 Tab by mouth daily. 7/18/19   Danni Love MD  
metoprolol succinate (TOPROL-XL) 50 mg XL tablet Take 1 Tab by mouth daily. 7/18/19   Danni Love MD  
omeprazole (PRILOSEC) 20 mg capsule Take 1 Cap by mouth daily. 7/18/19   Danni Love MD  
lisinopril-hydroCHLOROthiazide (PRINZIDE, ZESTORETIC) 20-25 mg per tablet Take 1 Tab by mouth daily. 5/7/19   Danni Love MD  
finasteride (PROSCAR) 5 mg tablet Take 1 Tab by mouth daily. 5/7/19   Danni Love MD  
tadalafil (CIALIS) 5 mg tablet One tablet once a day as needed for erectile dysfunction 6/19/18   Danni Love MD  
 
No Known Allergies No family history on file. SOCIAL HISTORY: 
Patient resides at Home. Patient ambulates with no assistance. Smoking history: never Alcohol history: None Objective:  
 
 
Physical Exam:  
Visit Vitals BP (!) 156/105 Pulse (!) 109 Temp 99.6 °F (37.6 °C) Resp 16 Ht 5' 9\" (1.753 m) Wt 61.4 kg (135 lb 5.8 oz) SpO2 95% BMI 19.99 kg/m² General:  Alert, cooperative, no distress, appears stated age. Head:  Normocephalic, without obvious abnormality, atraumatic. Eyes:  Conjunctivae/corneas clear. PERRL, EOMs intact. Fundi benign Ears:  Normal TMs and external ear canals both ears. Nose: Nares normal. Septum midline. Mucosa normal. No drainage or sinus tenderness.   
Throat: Lips, mucosa, and tongue normal. Teeth and gums normal.  
Neck: Supple, symmetrical, trachea midline, no adenopathy, thyroid: no enlargement/tenderness/nodules, no carotid bruit and no JVD. Back:   Symmetric, no curvature. ROM normal. No CVA tenderness. Lungs:   Clear to auscultation bilaterally. Chest wall:  No tenderness or deformity. Heart:  Regular rate and rhythm, S1, S2 normal, no murmur, click, rub or gallop. Abdomen:   Soft, non-tender. Bowel sounds normal. No masses,  No organomegaly. Genitalia:  Deferred Rectal:  Deferred Extremities: Extremities normal, atraumatic, no cyanosis or edema. Pulses: 2+ and symmetric all extremities. Skin: Skin color, texture, turgor normal. No rashes or lesions Lymph nodes: Cervical, supraclavicular, and axillary nodes normal.  
Neurologic:  Left facial droop present, but equal wrinkling of the forehead Strength equals bilaterally Cap refill: Brisk, less than 3 seconds Pulses: 2+, symmetric in all extremities Skin: Warm, dry, without rashes or lesions Data Review: All diagnostic labs and studies have been reviewed. CT head without acute pathology. CTA of the head and neck does not show any large vessel occlusion. Assessment and Plan Facial droop 
-On exam, consistent with upper motor neuron lesion 
-Unclear chronicity of his facial droop 
-Initial CT perfusion and CTA of the head and neck unremarkable 
-Neurology evaluated the patient and recommended no TPA 
-Patient needs further work-up with MRI of the brain, carotic duplex, and echocardiogram 
-Consult inpatient neurology 
-Start aspirin and continue statin from home 
-Check lipid profile in a.m. 
-PT, OT, ST consult 
-Keep n.p.o. Dysphagia 
-EGD report earlier today was unremarkable 
-Need to rule out CVA 
-Speech therapy evaluation Hypertension 
-Hold oral medications for now 
-Labetalol as needed for systolic blood pressure more than 165 Leukocytosis 
-Of unclear etiology 
-Check chest x-ray and UA Hyponatremia 
-Patient is clinically dehydrated, likely hypovolemic hyponatremia 
-Start IV fluid and monitor sodium level Hypokalemia 
-Also likely due to poor p.o. intake 
-Replete potassium Abnormal blood sugars 
-Check hemoglobin A1c in a.m. Estimated length of stay is 2 midnights. FUNCTIONAL STATUS PRIOR TO HOSPITALIZATION Ambulates Independently (including history of recent falls):  
 
Signed By: Bonnie Andersen MD   
 February 21, 2021

## 2021-02-21 NOTE — H&P
See prior Consultation Note. The patient was reexamined. No interval change in the last 3hrs. Proceed with procedure(s) with deep sedation or conscious sedation as clinically indicated.

## 2021-02-21 NOTE — CONSULTS
GI Consultation Note Caitlyn Kearns) NAME: Lisabeth Osgood : 1940 MRN: [id number] ATTG: MD Petra PCP: Alex Galindo, Not On File 
Date/Time:  2021 9:48 AM 
Subjective:  
REASON FOR CONSULT:     
[place] is a [de-identified] y.o.  male who I was asked to see for possible esophageal foreign body. He presented to ER this AM c/o persistent throat discomfort with swallow and inability to tolerate PO or liquids, after taking a solid TUMS in middle of the night without chewing it up, feeling that it got stuck in his throat. He has not been tolerant of further PO and liquids since then, including attempts at [employer] and ginggerale here in ER. He did not derive benefit from glucagon or IV Fluids here. Her denied abd pain, melena, hematemesis, Nausea, F/C, CP, SOB. He is anxious re: the persistent sx. He notes hx of GERD and sense of things getting stuck in the past.  He has never required prior endoscopy for other sx. He is on omeprazole for GERD as OP. Past Medical History:  
Diagnosis Date  Arthritis  Hearing decreased   
 getting worse per spouse  High blood pressure  Melanoma in situ of left shoulder ([address]   Prostate atrophy   
 takes finesteride Past Surgical History:  
Procedure Laterality Date  HX ROTATOR CUFF REPAIR Right  Social History Tobacco Use  Smoking status: Never Smoker  Smokeless tobacco: Never Used Substance Use Topics  Alcohol use: Not on file Comment: seldom History reviewed. No pertinent family history. No Known Allergies Home Medications: 
Prior to Admission Medications Prescriptions Last Dose Informant Patient Reported? Taking?  
atorvastatin (LIPITOR) 10 mg tablet   No No  
Sig: Take 1 Tab by mouth daily. clotrimazole (LOTRIMIN) 1 % topical cream   No No  
Sig: Apply  to affected area two (2) times a day. finasteride (PROSCAR) 5 mg tablet   No No  
Sig: Take 1 Tab by mouth daily. lisinopril-hydroCHLOROthiazide (PRINZIDE, ZESTORETIC) 20-25 mg per tablet   No No  
Sig: Take 1 Tab by mouth daily. metoprolol succinate (TOPROL-XL) 50 mg XL tablet   No No  
Sig: Take 1 Tab by mouth daily. miscellaneous medical supply misc   No No  
Sig: Dtap for Pertusis, Tenaus and diptheria  
omeprazole (PRILOSEC) 20 mg capsule   No No  
Sig: Take 1 Cap by mouth daily. ondansetron (ZOFRAN ODT) 4 mg disintegrating tablet   No No  
Sig: Take 1 Tab by mouth every eight (8) hours as needed for Nausea or Vomiting.  
sertraline (ZOLOFT) 50 mg tablet   No No  
Sig: Take 1 Tab by mouth daily. For anxiety  
tadalafil (CIALIS) 5 mg tablet   No No  
Sig: One tablet once a day as needed for erectile dysfunction Facility-Administered Medications: None Hospital medications: 
Current Facility-Administered Medications Medication Dose Route Frequency  ketamine (KETALAR) 50 mg/mL injection 100 mg  100 mg IntraVENous NOW  propofoL (DIPRIVAN) 10 mg/mL injection 100 mg  100 mg IntraVENous NOW Current Outpatient Medications Medication Sig  clotrimazole (LOTRIMIN) 1 % topical cream Apply  to affected area two (2) times a day.  ondansetron (ZOFRAN ODT) 4 mg disintegrating tablet Take 1 Tab by mouth every eight (8) hours as needed for Nausea or Vomiting.  miscellaneous medical supply misc Dtap for Pertusis, Tenaus and diptheria  sertraline (ZOLOFT) 50 mg tablet Take 1 Tab by mouth daily. For anxiety  atorvastatin (LIPITOR) 10 mg tablet Take 1 Tab by mouth daily.  metoprolol succinate (TOPROL-XL) 50 mg XL tablet Take 1 Tab by mouth daily.  omeprazole (PRILOSEC) 20 mg capsule Take 1 Cap by mouth daily.  lisinopril-hydroCHLOROthiazide (PRINZIDE, ZESTORETIC) 20-25 mg per tablet Take 1 Tab by mouth daily.  finasteride (PROSCAR) 5 mg tablet Take 1 Tab by mouth daily.  tadalafil (CIALIS) 5 mg tablet One tablet once a day as needed for erectile dysfunction REVIEW OF SYSTEMS:   
 [x]    Total of 11 systems reviewed as follows: 
Const:  negative fever, negative chills, negative weight loss Eyes:   negative diplopia or visual changes, negative eye pain ENT:   negative coryza, negative sore throat Resp:   negative cough, hemoptysis, dyspnea Cards:  negative for chest pain, palpitations, lower extremity edema :  negative for frequency, dysuria and hematuria Skin:   negative for rash and pruritus Heme:  negative for easy bruising and gum/nose bleeding MS:  negative for myalgias, arthralgias, back pain and muscle weakness Neurolo:  negative for headaches, dizziness, vertigo, memory problems Psych:  negative for feelings of anxiety, depression Pertinent Positives include : 
 
Objective: VITALS:   
Visit Vitals /89 (BP 1 Location: Right upper arm, BP Patient Position: At rest) Pulse 88 Temp 97.9 °F (36.6 °C) Resp 22 Ht 5' 8\" (1.727 m) Wt 66.2 kg (146 lb) SpO2 98% BMI 22.20 kg/m² Temp (24hrs), Av.9 °F (36.6 °C), Min:97.9 °F (36.6 °C), Max:97.9 °F (36.6 °C) PHYSICAL EXAM:  
General:    Alert, cooperative, no distress, appears stated age. Head:   Normocephalic, without obvious abnormality, atraumatic. Eyes:   Conjunctivae clear, anicteric sclerae. Pupils are equal 
Nose:  Nares normal. No drainage or sinus tenderness. Throat:    Lips, mucosa, and tongue normal.  No Thrush.  +Maintains Yanker in mouth Neck:  Supple, symmetrical,  no adenopathy, thyroid: non tender Back:    Symmetric,  No CVA tenderness. Lungs:   CTA bilaterally. No wheezing/rhonchi/rales. Chest wall:  No tenderness or deformity. No Accessory muscle use. Heart:   Regular rate and rhythm,  no murmur, rub or gallop. Abdomen:   Soft, non-tender. Not distended. Bowel sounds normal. No masses Extremities: Atraumatic, No cyanosis. No edema. No clubbing Skin:     Texture, turgor normal. No rashes/lesions/jaundice Lymph: Cervical, supraclavicular normal. 
 Psych:  Good insight. Not depressed. Not anxious or agitated. Neurologic: EOMs intact. No facial asymmetry/aphasia/slurred speech. Normal strength, A/O X 3. LAB DATA REVIEWED:   
No results found for this or any previous visit (from the past 48 hour(s)). IMAGING RESULTS: 
 []      I have personally reviewed the actual   []    CXR  []    CT  []     US Recommendations/Plan: Active Problems: * No active hospital problems. * 
  
___________________________________________________ RECOMMENDATIONS:   
[de-identified] M with dysphagia and odynophagia following solid pill ingestion. He is at risk for retained esophageal bolus or pill ulcer. Plan: 
1) EGD now to exclude FB or pill ulcer 2) IV PPI 3) Keep NPO Discussed Code Status:    []    Full Code      []    DNR   
___________________________________________________ Care Plan discussed with: 
  [x]    Patient   []    Family   [x]    Nursing   [x]    Attending Total Time :  50   minutes 
 ___________________________________________________ GI: Robi Benoit MD

## 2021-02-21 NOTE — PROCEDURES
NAME:  Adrian Chavis :   1940 MRN:   [id number] Date/Time:  2021 11:31 AM 
 
Esophagogastroduodenoscopy (EGD) Procedure Note Procedure: Esophagogastroduodenoscopy with biopsy Indication:  odynophagia/dysphagia Pre-operative Diagnosis: see indication above Post-operative Diagnosis: see findings below :  Debbe Duane, MD 
Referring Provider:   -[facility], Not On File Exam: Airway: clear, no airway problems anticipated Heart: RRR, without gallops or rubs Lungs: clear bilaterally without wheezes, crackles, or rhonchi Abdomen: soft, nontender, nondistended, bowel sounds present Mental Status: awake, alert and oriented to person, place and time Anethesia/Sedation:  Versed 1 mg IV and Fentanyl 50 mcg IV Procedure Details After informed consent was obtained for the procedure, with all risks and benefits of procedure explained the patient was taken to the endoscopy suite and placed in the left lateral decubitus position. Following sequential administration of sedation as per above, the RVVF020 gastroscope was inserted into the mouth and advanced under direct vision to second portion of the duodenum. A careful inspection was made as the gastroscope was withdrawn, including a retroflexed view of the proximal stomach; findings and interventions are described below. Findings:   
-No obstruction at level of vocal cords -Minimal distal esophageal erythema (redness) to suggest possible esophagitis; biopsied 
-No obstruction of the esophagus noted- no retained foreign body, stricture, or ulcer 
-Small 3cm hiatal hernia from 37-40cm 
-Fluid filled stomach with normal mucosa noted 
-Normal duodenum Therapies:  biopsy of esophagus Specimens: #1 distal esophagus EBL:  None. Complications:   None; patient tolerated the procedure well. Impression:   
-No obstruction at level of vocal cords -Minimal distal esophageal erythema (redness) to suggest possible esophagitis; biopsied 
-No obstruction of the esophagus noted- no retained foreign body, stricture, or ulcer 
-Small 3cm hiatal hernia from 37-40cm 
-Fluid filled stomach with normal mucosa noted 
-Normal duodenum Recommendations: 
-Continue acid suppression with a proton pump inhibitor. ,  
-Await pathology which will be available in 1 wk.,  
-Follow up with primary care physician Discharge disposition:  Home in the company of  when able to ambulate Mica Kendall MD

## 2021-02-21 NOTE — ED NOTES
Assumed care of pt via EMS stretcher. Pt is A&O x 4 and reports CC of Tums Stuck in throat from last night. Pt states he was unaware he had to chew the medication. Pt reports he was able to feel the Tums as he tried to get it out however the Tums went further down his esophagus. Upon arrival pt is conscious and breathing on his own and is able to speak. Pt reports having difficulty swallowing.  
 
0825 Pt attempted to intake the EZ gas, did not tolerate well, Pt started vomiting and having difficulty breathing, O2 sats decrease to 87%, Place pt on 2L NC O2 sats improve to 95%  and provided suction at bedside. 0830 Dr.Min Mckenzie at bedside Order receive for IV Zofran & Glucagon 9805 Son at bedside w/ Patient 3257 GI at bedside evaluating pt  
 
1030 Endoscopy team & GI at bedside performing procedure [address] Endoscopy Nurse provided report NO Tums or any foreign object was not seen during procedure,  saw some bleeding but not active, However saw Esophagitis and perform an biopsy.  Romario  report pt receive 1mg of Versed, 50mcg of Fentanyl and 200 ML of NS  
 
1210 Prior to d/c ambulated pt out the room, Pt tolerated ambulation well,This RN also review pt d/c extensively with Pt

## 2021-02-21 NOTE — ED TRIAGE NOTES
Pt presents to ED c/o L facial drooping. Pt was seen this morning at 0800 at THE [facility] ER because he choked on a pill. Pt states when he got home around 1400 and took his mask off, his family noticed that his face was drooping. Pt reports the last time he was bale to swallow normally was midnight on Friday night.

## 2021-02-21 NOTE — ROUTINE PROCESS
Mitch Wasserman 1940 
[id number] Situation: 
Verbal report Given to: [place] Procedure: Procedure(s): ESOPHAGOGASTRODUODENOSCOPY (EGD) ESOPHAGOGASTRODUODENAL (EGD) BIOPSY Background: 
 
Preoperative diagnosis: Foreign Body Postoperative diagnosis: Hiatal Hernia, Esophagitis :  Dr. Manolo Brice Assistant(s): Endoscopy Technician-1: Meryle Abt Endoscopy RN-1: Vicente White Specimens:  
ID Type Source Tests Collected by Time Destination 1 : biopsy Preservative Esophagus, Distal  [place] Triana MD 2/21/2021 1115 Pathology H. Pylori  no Assessment: 
Intra-procedure medications Versed yes1 mg Fentanyl yes*50 mcg Anesthesia gave intra-procedure sedation and medications, see anesthesia flow sheet no Intravenous fluids: NS@ [facility] Vital signs stable Abdominal assessment: round and soft Recommendation: 
Discharge patient per MD order. Return to floor Family or Friend Permission to share finding with family or friend yes and n/a

## 2021-02-21 NOTE — ED PROVIDER NOTES
EMERGENCY DEPARTMENT HISTORY AND PHYSICAL EXAM 
 
 
Date: 2/21/2021 Patient Name: Los Beauchamp History of Presenting Illness Chief Complaint Patient presents with  Foreign Body Swallowed HPI: [place] Bea[place]amp, [de-identified] y.o. male presents to the ED with cc of throat discomfort. He took a Tums at midnight, he thought that they were to be swallowed instead of chewed. He felt like he got stuck in the back of his throat. Initially he thought that he can feel it with his finger in the back of his throat, however now it feels like it is farther down. Since then, he states he has not been able to eat or drink anything. He denies any difficulty breathing. Reports a history of a \"hiatal hernia\" that has caused food gets stuck in the past, however has never required any procedure to have anything removed. There are no other complaints, changes, or physical findings at this time. PCP: [facility], Not On File No current facility-administered medications on file prior to encounter. Current Outpatient Medications on File Prior to Encounter Medication Sig Dispense Refill  clotrimazole (LOTRIMIN) 1 % topical cream Apply  to affected area two (2) times a day. 45 g 0  
 ondansetron (ZOFRAN ODT) 4 mg disintegrating tablet Take 1 Tab by mouth every eight (8) hours as needed for Nausea or Vomiting. 10 Tab 0  
 miscellaneous medical supply misc Dtap for Pertusis, [place] and diptheria 1 Each 0  
 sertraline (ZOLOFT) 50 mg tablet Take 1 Tab by mouth daily. For anxiety 90 Tab 3  
 atorvastatin (LIPITOR) 10 mg tablet Take 1 Tab by mouth daily. 90 Tab 3  
 metoprolol succinate (TOPROL-XL) 50 mg XL tablet Take 1 Tab by mouth daily. 90 Tab 3  
 omeprazole (PRILOSEC) 20 mg capsule Take 1 Cap by mouth daily. 90 Cap 3  
 lisinopril-hydroCHLOROthiazide (PRINZIDE, ZESTORETIC) 20-25 mg per tablet Take 1 Tab by mouth daily. 90 Tab 3  
 finasteride (PROSCAR) 5 mg tablet Take 1 Tab by mouth daily.  90 Tab 3  tadalafil (CIALIS) 5 mg tablet One tablet once a day as needed for erectile dysfunction 30 Tab 5 Past History Past Medical History: 
Past Medical History:  
Diagnosis Date  Arthritis  Hearing decreased   
 getting worse per spouse  High blood pressure  Melanoma in situ of left shoulder ([address] 1975  Prostate atrophy   
 takes finesteride Past Surgical History: 
Past Surgical History:  
Procedure Laterality Date  HX ROTATOR CUFF REPAIR Right 2011 Family History: 
History reviewed. No pertinent family history. Social History: 
Social History Tobacco Use  Smoking status: Never Smoker  Smokeless tobacco: Never Used Substance Use Topics  Alcohol use: Not on file Comment: seldom  Drug use: Not on file Allergies: 
No Known Allergies Review of Systems  
no fever No eye pain No ear pain 
no shortness of breath 
no chest pain 
no abdominal pain Physical Exam  
Physical Exam 
Constitutional:   
   General: He is not in acute distress. HENT:  
   Head: Normocephalic. Comments: No drooling, no stridor, breathing comfortably, oropharynx widely patent, no tenderness or masses of the neck Mouth/Throat:  
   Mouth: Mucous membranes are moist.  
Eyes:  
   Extraocular Movements: Extraocular movements intact. Neck: Musculoskeletal: Neck supple. Cardiovascular:  
   Rate and Rhythm: Normal rate and regular rhythm. Pulmonary:  
   Effort: Pulmonary effort is normal.  
   Breath sounds: Normal breath sounds. Abdominal:  
   Palpations: Abdomen is soft. Tenderness: There is no abdominal tenderness. Musculoskeletal:     
   General: No swelling or tenderness. Skin: 
   General: Skin is warm. Neurological:  
   General: No focal deficit present. Mental Status: He is alert and oriented to person, place, and time. Psychiatric:     
   Mood and Affect: Mood normal.  
 
 
 
Diagnostic Study Results Labs -  No results found for this or any previous visit (from the past 24 hour(s)). Radiologic Studies - No orders to display CT Results  (Last 48 hours) None CXR Results  (Last 48 hours) None Medical Decision Making I am the first provider for this patient. I reviewed the vital signs, available nursing notes, past medical history, past surgical history, family history and social history. Vital Signs-Reviewed the patient's vital signs. Patient Vitals for the past 24 hrs: 
 Temp Pulse Resp BP SpO2  
02/21/21 0819 97.9 °F (36.6 °C) 88 22 126/89 98 % Provider Notes (Medical Decision Making):  
77-year-old male presenting with sensation of esophageal foreign body. Reports swallowing at times and feels like it is stuck in his throat. I suspect that a Tums will have dissolved by now at least partially resolved, however he denies being able to tolerate any p.o. No signs of any airway obstruction at this time. We will try EZ gas. This is attempted, however patient immediately gags and spits it all back up. He states that now it is hard to breathe. Oxygen saturations remain good. I will consult gastroenterology. ED Course:  
 
Initial assessment performed. The patients presenting problems have been discussed, and they are in agreement with the care plan formulated and outlined with them. I have encouraged them to ask questions as they arise throughout their visit. GI is at bedside. He is able to tolerate sips of water now. However, he continues to state that he feels like his throat is uncomfortable. Therefore, GI is elected to do a bedside EGD. They do not find any evidence of foreign body. On reevaluation, he is resting comfortably, fully alert, vitals unremarkable, he is able to tolerate p.o. He will follow-up with his primary care doctor and gastroenterology. He will return if he has any increasing pain, fevers, or any inability to tolerate p.o.  
 
Critical Care Time:  
 
 
 
Disposition: 
Home PLAN: 
1. Current Discharge Medication List  
  
 
2. Follow-up Information None Return to ED if worse Diagnosis Clinical Impression: Acute odynophagia

## 2021-02-21 NOTE — DISCHARGE INSTRUCTIONS
Daly Quevedo  [id number]  1940    EGD DISCHARGE INSTRUCTIONS  Discomfort:  Sore throat- throat lozenges or warm salt water gargle  redness at IV site- apply warm compress to area; if redness or soreness persist- contact your physician  Gaseous discomfort- walking, belching will help relieve any discomfort  You may not operate a vehicle for 12 hours  You may not engage in an occupation involving machinery or appliances for rest of today  You may not drink alcoholic beverages for at least 12 hours  Avoid making any critical decisions for at least 24 hour  DIET  You may have minimal sips at this time-- do not eat or drink for two hours. You may eat and drink after 1pm today  Ingest liquids only today  You may resume your regular diet tomorrow- however -  remember your colon is empty and a heavy meal will produce gas. Avoid these foods:  vegetables, fried / greasy foods, carbonated drinks    MEDICATIONS:        ACTIVITY  You may resume your normal daily activities until tomorrow AM;  Spend the remainder of the day resting -  avoid any strenuous activity. CALL M.D.   ANY SIGN OF   Increasing pain, nausea, vomiting  Abdominal distension (swelling)  New increased bleeding (oral or rectal)  Fever (chills)  Pain in chest area  Bloody discharge from nose or mouth  Shortness of breath    IMPRESSION:  -No obstruction at level of vocal cords  -Minimal distal esophageal erythema (redness) to suggest possible esophagitis; biopsied  -No obstruction of the esophagus noted- no retained foreign body, stricture, or ulcer  -Small 3cm hiatal hernia from 37-40cm  -Fluid filled stomach with normal mucosa noted  -Normal duodenum    Follow-up Instructions:  -Call Dr. Crhissy Martinez for the results of procedure / biopsy in 7-10 days  -Telephone # [phone]  -Continue acid suppression with your omeprazole,   -Await pathology which will be available in 1 wk.,   -Follow up with primary care physician    Nay Watson MD

## 2021-02-21 NOTE — PROGRESS NOTES
Neurocritical Care Code Stroke Documentation Symptoms:   dysphagia, left facial droop Patient reportedly took some Tums Friday night for indigestion and had difficulty swallowing. He went to [facility] today for evaluation of his dysphagia and had EGD performed today with biopsy and there was concern for possible esophagitis, No obstruction of the esophagus and at the level of the vocal cords was noted, hiatal hernia was noted per review of report. Patient then went home and family members noticed a significant left facial droop reportedly around 1400 today. Unclear when the facial droop started. Last Known Well: [employer] on Friday night going into Saturday morning Medical hx: Arthritis, HTN prostate atrophy, melanoma in situ of left shoulder, HLD Anticoagulation:  none VAN:   Negative NIHSS:   1a-LOC: 0 
  1b-Month/Age:1 
  1c-Open/Close Hand:0 2-Best Gaze:0 
  3-Visual Fields:0 
  4-Facial Palsy:2 
  5a-Left Arm:0 
  5b-Right Arm:0 
  6a-Left Le 
  6b-Right Le 
  7-Limb Ataxia: 2 8-Sensory:0 
  9-Best Language:0 
  10-Dysarthria:1 
  11-Extinction/Inattention:0 
TOTAL SCORE:6  
Imaging:   CT: No acute process CTA: No large vessel occlusion CTP: No perfusion abnormality Plan:   TPA Candidate: NO 
  Mechanical thrombectomy Candidate: NO  
 
Patient noted to have some ataxia on exam and still complains of dysphagia. Recommend MRI of Brain to assess for ischemia. Discussed with Dr. Chapis Flores (ED Physician) and RN. After speaking with Dr. Chapis Flores, there is also concern of possible Bell's Palsy when teleneuro evaluated patient. Time spent: approx. 45 minutes. Delores Barnes NP Neurocritical Care Nurse Practitioner [phone]

## 2021-02-22 NOTE — PROGRESS NOTES
TRANSFER - IN REPORT: 
 
Verbal report received from JAISONΤDAVIDΟΒΟLUPEΟJAISON RN on Yari Godinez  being received from ED for routine progression of care Report consisted of patients Situation, Background, Assessment and  
Recommendations(SBAR). Information from the following report(s) SBAR, ED Summary, Recent Results and Cardiac Rhythm SR was reviewed with the receiving nurse. Opportunity for questions and clarification was provided. Assessment completed upon patients arrival to unit and care assumed. Pt belongings at bedside

## 2021-02-22 NOTE — PROGRESS NOTES
SLP Contact Note SLP evaluation complete. Gave patient a sip of water and pt began vomiting blood immediately. Therefore, other PO trials deferred. Recommend NPO and continued GI work-up. Full note to follow. Thank you, Lili Fragoso M.Ed, CCC-SLP Speech-Language Pathologist

## 2021-02-22 NOTE — PROGRESS NOTES
[address] [facility] Adult  Hospitalist Group Hospitalist Progress Note Osiris Irizarry MD 
Answering service: [phone] OR [phone] from in house phone Progress Note Patient: Maria Esther Roach MRN: [id number]  SSN: [id number] YOB: 1940  Age: [de-identified] y.o. Sex: male Admit Date: 2/21/2021 LOS: 1 day Subjective:  
 
I have obtained more history from patient's son over the phone. Per report from the sun, patient has dementia and lives at an assisted living facility. He was doing fine until he swallow a jesse on 02/20 and complained of feeling stuck in the throat. He was evaluated at [address] [facility] and EGD done on 02/21 showing esophagitis but no other acute finding. He was discharged to home yesterday and son noticed left facial droop and brought him to ER here. He was seen by ST this AM and noted vomiting blood after he swallow some water. Objective:  
 
Vitals:  
 02/22/21 0800 02/22/21 0839 02/22/21 0900 02/22/21 1000 BP: (!) 153/86 (!) 140/81 (!) 137/106 (!) 162/85 Pulse: 85 81 79 83 Resp: 19 27 17 15 Temp:      
SpO2: 95% 93% 96% 94% Weight:      
Height:      
  
 
Intake and Output: 
Current Shift: No intake/output data recorded. Last three shifts: 02/20 1901 - 02/22 0700 In: -  
Out: 200 [Urine:200] Physical Exam:  
GENERAL: alert THROAT & NECK: normal and no erythema or exudates noted. LUNG: clear to auscultation bilaterally HEART: regular rate and rhythm, S1, S2 normal, no murmur, click, rub or gallop ABDOMEN: soft, non-tender. Bowel sounds normal. No masses,  no organomegaly EXTREMITIES:  extremities normal, atraumatic, no cyanosis or edema SKIN: no rash or abnormalities NEUROLOGIC: Alert and oriented to person only PSYCHIATRIC: non focal 
 
Lab/Data Review: All lab results for the last 24 hours reviewed. Recent Results (from the past 24 hour(s)) EKG, 12 LEAD, INITIAL Collection Time: 02/21/21  3:37 PM  
Result Value Ref Range Ventricular Rate 91 BPM  
 Atrial Rate 91 BPM  
 P-R Interval 190 ms QRS Duration 72 ms Q-T Interval 362 ms QTC Calculation (Bezet) 445 ms Calculated P Axis 55 degrees Calculated R Axis -19 degrees Calculated T Axis -13 degrees Diagnosis Normal sinus rhythm Voltage criteria for left ventricular hypertrophy Nonspecific ST and T wave abnormality No previous ECGs available CBC WITH AUTOMATED DIFF Collection Time: 02/21/21  3:39 PM  
Result Value Ref Range WBC 21.6 (H) 4.1 - 11.1 K/uL  
 RBC 4.34 4.10 - 5.70 M/uL  
 HGB 13.8 12.1 - 17.0 g/dL HCT 40.8 36.6 - 50.3 % MCV 94.0 80.0 - 99.0 FL  
 MCH 31.8 26.0 - 34.0 PG  
 MCHC 33.8 30.0 - 36.5 g/dL  
 RDW 12.7 11.5 - 14.5 % PLATELET 676 952 - 678 K/uL MPV 9.7 8.9 - 12.9 FL  
 NRBC 0.0 0  WBC ABSOLUTE NRBC 0.00 0.00 - 0.01 K/uL NEUTROPHILS 90 (H) 32 - 75 % LYMPHOCYTES 2 (L) 12 - 49 % MONOCYTES 7 5 - 13 % EOSINOPHILS 0 0 - 7 % BASOPHILS 0 0 - 1 % IMMATURE GRANULOCYTES 1 (H) 0.0 - 0.5 % ABS. NEUTROPHILS 19.5 (H) 1.8 - 8.0 K/UL  
 ABS. LYMPHOCYTES 0.4 (L) 0.8 - 3.5 K/UL  
 ABS. MONOCYTES 1.5 (H) 0.0 - 1.0 K/UL  
 ABS. EOSINOPHILS 0.0 0.0 - 0.4 K/UL  
 ABS. BASOPHILS 0.0 0.0 - 0.1 K/UL  
 ABS. IMM. GRANS. 0.2 (H) 0.00 - 0.04 K/UL  
 DF SMEAR SCANNED    
 RBC COMMENTS NORMOCYTIC, NORMOCHROMIC METABOLIC PANEL, COMPREHENSIVE Collection Time: 02/21/21  3:39 PM  
Result Value Ref Range Sodium 133 (L) 136 - 145 mmol/L Potassium 2.7 (LL) 3.5 - 5.1 mmol/L Chloride 94 (L) 97 - 108 mmol/L  
 CO2 31 21 - 32 mmol/L Anion gap 8 5 - 15 mmol/L Glucose 132 (H) 65 - 100 mg/dL BUN 19 6 - 20 MG/DL Creatinine 1.21 0.70 - 1.30 MG/DL  
 BUN/Creatinine ratio 16 12 - 20 GFR est AA >60 >60 ml/min/1.73m2 GFR est non-AA 58 (L) >60 ml/min/1.73m2 Calcium 9.1 8.5 - 10.1 MG/DL  Bilirubin, total 0.5 0.2 - 1.0 MG/DL  
 ALT (SGPT) 20 12 - 78 U/L  
 AST (SGOT) 18 15 - 37 U/L Alk. phosphatase 57 45 - 117 U/L Protein, total 7.3 6.4 - 8.2 g/dL Albumin 3.8 3.5 - 5.0 g/dL Globulin 3.5 2.0 - 4.0 g/dL A-G Ratio 1.1 1.1 - 2.2    
TROPONIN I Collection Time: 02/21/21  3:39 PM  
Result Value Ref Range Troponin-I, Qt. <0.05 <0.05 ng/mL SAMPLES BEING HELD Collection Time: 02/21/21  3:39 PM  
Result Value Ref Range SAMPLES BEING HELD 1red 1 kirsten COMMENT Add-on orders for these samples will be processed based on acceptable specimen integrity and analyte stability, which may vary by analyte. URINALYSIS W/MICROSCOPIC Collection Time: 02/21/21  8:03 PM  
Result Value Ref Range Color YELLOW/STRAW Appearance CLEAR CLEAR Specific gravity 1.005    
 pH (UA) 8.5 (H) 5.0 - 8.0 Protein 30 (A) NEG mg/dL Glucose Negative NEG mg/dL Ketone Negative NEG mg/dL Bilirubin Negative NEG Blood SMALL (A) NEG Urobilinogen 0.2 0.2 - 1.0 EU/dL Nitrites Negative NEG Leukocyte Esterase Negative NEG    
 WBC 0-4 0 - 4 /hpf  
 RBC 5-10 0 - 5 /hpf Epithelial cells FEW FEW /lpf Bacteria Negative NEG /hpf Hyaline cast 0-2 0 - 5 /lpf SARS-COV-2 Collection Time: 02/21/21  8:39 PM  
Result Value Ref Range SARS-CoV-2 Please find results under separate order LIPID PANEL Collection Time: 02/22/21 12:50 AM  
Result Value Ref Range LIPID PROFILE Cholesterol, total 173 <200 MG/DL Triglyceride 82 <150 MG/DL  
 HDL Cholesterol 50 MG/DL  
 LDL, calculated 106.6 (H) 0 - 100 MG/DL VLDL, calculated 16.4 MG/DL  
 CHOL/HDL Ratio 3.5 0.0 - 5.0 HEMOGLOBIN A1C WITH EAG Collection Time: 02/22/21 12:50 AM  
Result Value Ref Range Hemoglobin A1c 5.7 (H) 4.0 - 5.6 % Est. average glucose 117 mg/dL METABOLIC PANEL, BASIC Collection Time: 02/22/21 12:50 AM  
Result Value Ref Range Sodium 142 136 - 145 mmol/L  Potassium 3.4 (L) 3.5 - 5.1 mmol/L Chloride 107 97 - 108 mmol/L  
 CO2 28 21 - 32 mmol/L Anion gap 7 5 - 15 mmol/L Glucose 116 (H) 65 - 100 mg/dL BUN 20 6 - 20 MG/DL Creatinine 1.04 0.70 - 1.30 MG/DL  
 BUN/Creatinine ratio 19 12 - 20 GFR est AA >60 >60 ml/min/1.73m2 GFR est non-AA >60 >60 ml/min/1.73m2 Calcium 8.7 8.5 - 10.1 MG/DL  
CBC WITH AUTOMATED DIFF Collection Time: 02/22/21 12:50 AM  
Result Value Ref Range WBC 21.3 (H) 4.1 - 11.1 K/uL  
 RBC 4.20 4. 10 - 5.70 M/uL  
 HGB 13.3 12.1 - 17.0 g/dL HCT 40.3 36.6 - 50.3 % MCV 96.0 80.0 - 99.0 FL  
 MCH 31.7 26.0 - 34.0 PG  
 MCHC 33.0 30.0 - 36.5 g/dL  
 RDW 13.2 11.5 - 14.5 % PLATELET 378 448 - 209 K/uL MPV 9.7 8.9 - 12.9 FL  
 NRBC 0.0 0  WBC ABSOLUTE NRBC 0.00 0.00 - 0.01 K/uL NEUTROPHILS 84 (H) 32 - 75 % LYMPHOCYTES 7 (L) 12 - 49 % MONOCYTES 9 5 - 13 % EOSINOPHILS 0 0 - 7 % BASOPHILS 0 0 - 1 % IMMATURE GRANULOCYTES 0 0.0 - 0.5 % ABS. NEUTROPHILS 17.7 (H) 1.8 - 8.0 K/UL  
 ABS. LYMPHOCYTES 1.5 0.8 - 3.5 K/UL  
 ABS. MONOCYTES 1.9 (H) 0.0 - 1.0 K/UL  
 ABS. EOSINOPHILS 0.0 0.0 - 0.4 K/UL  
 ABS. BASOPHILS 0.0 0.0 - 0.1 K/UL  
 ABS. IMM. GRANS. 0.1 (H) 0.00 - 0.04 K/UL  
 DF AUTOMATED    
CBC WITH AUTOMATED DIFF Collection Time: 02/22/21 10:51 AM  
Result Value Ref Range WBC 15.7 (H) 4.1 - 11.1 K/uL  
 RBC 4.27 4. 10 - 5.70 M/uL  
 HGB 13.4 12.1 - 17.0 g/dL HCT 40.3 36.6 - 50.3 % MCV 94.4 80.0 - 99.0 FL  
 MCH 31.4 26.0 - 34.0 PG  
 MCHC 33.3 30.0 - 36.5 g/dL  
 RDW 13.4 11.5 - 14.5 % PLATELET 052 854 - 191 K/uL MPV 9.9 8.9 - 12.9 FL  
 NRBC 0.0 0  WBC ABSOLUTE NRBC 0.00 0.00 - 0.01 K/uL NEUTROPHILS 85 (H) 32 - 75 % LYMPHOCYTES 8 (L) 12 - 49 % MONOCYTES 7 5 - 13 % EOSINOPHILS 0 0 - 7 % BASOPHILS 0 0 - 1 % IMMATURE GRANULOCYTES 0 0.0 - 0.5 % ABS. NEUTROPHILS 13.3 (H) 1.8 - 8.0 K/UL  
 ABS. LYMPHOCYTES 1.2 0.8 - 3.5 K/UL  
 ABS. MONOCYTES 1.1 (H) 0.0 - 1.0 K/UL  
 ABS. EOSINOPHILS 0.0 0.0 - 0.4 K/UL  
 ABS. BASOPHILS 0.0 0.0 - 0.1 K/UL  
 ABS. IMM. GRANS. 0.1 (H) 0.00 - 0.04 K/UL  
 DF AUTOMATED METABOLIC PANEL, BASIC Collection Time: 02/22/21 10:51 AM  
Result Value Ref Range Sodium 143 136 - 145 mmol/L Potassium 3.2 (L) 3.5 - 5.1 mmol/L Chloride 109 (H) 97 - 108 mmol/L  
 CO2 28 21 - 32 mmol/L Anion gap 6 5 - 15 mmol/L Glucose 98 65 - 100 mg/dL BUN 19 6 - 20 MG/DL Creatinine 1.02 0.70 - 1.30 MG/DL  
 BUN/Creatinine ratio 19 12 - 20 GFR est AA >60 >60 ml/min/1.73m2 GFR est non-AA >60 >60 ml/min/1.73m2 Calcium 8.8 8.5 - 10.1 MG/DL  
COAGULATION SCREEN Collection Time: 02/22/21 10:51 AM  
Result Value Ref Range Fibrinogen VERIFIED BY DILUTION 200 - 475 mg/dL INR 1.1 0.9 - 1.1 Prothrombin time 11.5 (H) 9.0 - 11.1 sec  
 aPTT 29.2 22.1 - 31.0 sec  
 aPTT, therapeutic range     58.0 - 77.0 SECS Imaging: 
 
  
 
Assessment and Plan:  
 
Facial droop 
-On exam, consistent with upper motor neuron lesion 
-Initial CT perfusion and CTA of the head and neck unremarkable 
-Neurology evaluated the patient and recommended no TPA 
-Patient needs further work-up with MRI of the brain, carotic duplex, and echocardiogram 
-Consult inpatient neurology 
-[facility] for [facility] 
-EGD report 02/21 shows esophagitis otherwise unremarkable 
-Need to rule out CVA 
-Speech therapy evaluation Hematamesis 
-Noted today while speech evaluating the patient 
-EGD report from 02/21 as above 
-Hold ASA and lovenox 
-GI consult 
  
Hypertension 
-Hold oral medications for now 
-Labetalol as needed for systolic blood pressure more than 165 
  
Leukocytosis 
-Of unclear etiology, CXR with reticular interstitial opacities, ruling out COVID 
-UA unremarkable 
  
Hyponatremia 
-Patient is clinically dehydrated, likely hypovolemic hyponatremia 
-Continue IV fluid and monitor sodium level 
  
Hypokalemia 
-Also likely due to poor p.o. intake 
-Replete potassium 
  
Abnormal blood sugars 
-Check hemoglobin A1c Disposition plan: Unstable for dc, MRI and neuro evaluation pending. Also GI evaluation requested for GI bleed. Signed By: Belinda Canseco MD   
 February 22, 2021

## 2021-02-22 NOTE — CONSULTS
[address] [facility] 
 [address] [place] [address] GASTROENTEROLOGY CONSULTATION NOTE Will Kathy Szymanski, [address] [facility] office [phone] NP/PA in-hospital cell phone M-F until 4:30PM 
After 5PM or on weekends, please call  for physician on call NAME:  Lacey Cisneros :   1940 MRN:   [id number] Referring Physician: Dr. Tyrell Rutherford Consult Date: 2021 12:17 PM 
  
History of Present Illness:  Patient is a [de-identified] y.o. who is seen in consultation at the request of Dr. Teddy Gage for GI bleed. Patient presented to the ED for evaluation of facial drop. Patient was seen in the ED at [facility] yesterday prior to presentation for dysphagia. EGD (21) by Dr. Elisa Doll showed no obstruction at the level of the vocal cords; minimal distal esophageal erythema (redness) to suggest possible esophagitis (biopsied); no obstruction of the esophageus noted - no retained foreign body, stricture, or ulcer; 3 cm hiatal hernia; fluid-filled stomach with normal mucosa noted; normal duodenum. Patient was discharged home. At home, the patient's son noticed a left-sided facial drop for which patient presented to the ED. He was admitted to the hospital on 21. SLP evaluated the patient this morning. He was given a sip of water and immediately began vomiting blood. Further PO trials were deferred. Patient is a COVID rule out. Discussed with RN. Patient is reportedly hard of hearing and a poor historian. I was unable to reach the patient on the phone. Patient reportedly has been spitting up some blood since the episode. No complaints of abdominal pain. No bowel movement today. Most recent Hgb was obtained directly following the episode. I have reviewed the emergency room note, hospital admission note, notes by all other clinicians who have seen the patient during this hospitalization to date.  I have reviewed the problem list and the reason for this hospitalization. I have reviewed the allergies and the medications the patient was taking at home prior to this hospitalization. PMH: 
Past Medical History:  
Diagnosis Date  Arthritis  Hearing decreased   
 getting worse per spouse  High blood pressure  Melanoma in situ of left shoulder ([address] 1975  Prostate atrophy   
 takes finesteride PSH: 
Past Surgical History:  
Procedure Laterality Date  HX ROTATOR CUFF REPAIR Right 2011  UPPER GI ENDOSCOPY,BIOPSY  2/21/2021 Allergies: 
No Known Allergies Home Medications: 
Prior to Admission Medications Prescriptions Last Dose Informant Patient Reported? Taking?  
atorvastatin (LIPITOR) 10 mg tablet   No No  
Sig: Take 1 Tab by mouth daily. clotrimazole (LOTRIMIN) 1 % topical cream   No No  
Sig: Apply  to affected area two (2) times a day. finasteride (PROSCAR) 5 mg tablet   No No  
Sig: Take 1 Tab by mouth daily. lisinopril-hydroCHLOROthiazide (PRINZIDE, ZESTORETIC) 20-25 mg per tablet   No No  
Sig: Take 1 Tab by mouth daily. metoprolol succinate (TOPROL-XL) 50 mg XL tablet   No No  
Sig: Take 1 Tab by mouth daily. miscellaneous medical supply misc   No No  
Sig: Dtap for Pertusis, [place] and diptheria  
omeprazole (PRILOSEC) 20 mg capsule   No No  
Sig: Take 1 Cap by mouth daily. ondansetron (ZOFRAN ODT) 4 mg disintegrating tablet   No No  
Sig: Take 1 Tab by mouth every eight (8) hours as needed for Nausea or Vomiting.  
sertraline (ZOLOFT) 50 mg tablet   No No  
Sig: Take 1 Tab by mouth daily. For anxiety  
tadalafil (CIALIS) 5 mg tablet   No No  
Sig: One tablet once a day as needed for erectile dysfunction Facility-Administered Medications: None Hospital Medications: 
Current Facility-Administered Medications Medication Dose Route Frequency  labetaloL (NORMODYNE;TRANDATE) 5 mg/mL injection  atorvastatin (LIPITOR) tablet 40 mg  40 mg Oral QHS  labetaloL (NORMODYNE;TRANDATE) injection 5 mg  5 mg IntraVENous Q6H PRN  
 metoprolol succinate (TOPROL-XL) XL tablet 50 mg  50 mg Oral DAILY  pantoprazole (PROTONIX) tablet 40 mg  40 mg Oral ACB  sertraline (ZOLOFT) tablet 50 mg  50 mg Oral DAILY  [Held by provider] aspirin chewable tablet 81 mg  81 mg Oral DAILY  lactated Ringers infusion  75 mL/hr IntraVENous CONTINUOUS  
 sodium chloride (NS) flush 5-40 mL  5-40 mL IntraVENous Q8H  
 sodium chloride (NS) flush 5-40 mL  5-40 mL IntraVENous PRN  
 acetaminophen (TYLENOL) tablet 650 mg  650 mg Oral Q6H PRN Or  
 acetaminophen (TYLENOL) suppository 650 mg  650 mg Rectal Q6H PRN  polyethylene glycol (MIRALAX) packet 17 g  17 g Oral DAILY PRN  promethazine (PHENERGAN) tablet 12.5 mg  12.5 mg Oral Q6H PRN Or  
 ondansetron (ZOFRAN) injection 4 mg  4 mg IntraVENous Q6H PRN  
 [Held by provider] enoxaparin (LOVENOX) injection 40 mg  40 mg SubCUTAneous DAILY Social History: 
Social History Tobacco Use  Smoking status: Never Smoker  Smokeless tobacco: Never Used Substance Use Topics  Alcohol use: Not on file Comment: seldom Family History: No family history on file. Review of Systems: 
Unable to obtain Objective:  
 
Patient Vitals for the past 8 hrs: 
 BP Temp Pulse Resp SpO2  
02/22/21 1000 (!) 162/85  83 15 94 % 02/22/21 0900 (!) 137/106  79 17 96 % 02/22/21 0839 (!) 140/81  81 27 93 % 02/22/21 0800 (!) 153/86  85 19 95 % 02/22/21 0652 (!) 175/87 98.4 °F (36.9 °C) 79 18 96 % No intake/output data recorded. 02/20 1901 - 02/22 0700 In: -  
Out: 200 [Urine:200] EXAM:   
Patient was not physically seen due to COVID rule out Data Review Recent Labs  
  02/22/21 
1051 02/22/21 
0050 WBC 15.7* 21.3* HGB 13.4 13.3 HCT 40.3 40.3  228 Recent Labs  
  02/22/21 
1051 02/22/21 
0050  142  
K 3.2* 3.4*  
* 107 CO2 28 28 BUN 19 20 CREA 1.02 1.04  
GLU 98 116*  
CA 8.8 8.7 Recent Labs  
  02/21/21 
1539 AP 57  
TP 7.3 ALB 3.8 GLOB 3.5 Recent Labs  
  02/22/21 
1051 INR 1.1 PTP 11.5* APTT 29.2 EGD (2/21/21) by Dr. Benoit Crain showed no obstruction at the level of the vocal cords; minimal distal esophageal erythema (redness) to suggest possible esophagitis (biopsied); no obstruction of the esophageus noted - no retained foreign body, stricture, or ulcer; 3 cm hiatal hernia; fluid-filled stomach with normal mucosa noted; normal duodenum. Assessment:  
· Hematemesis: EGD (2/21/21) with above findings. Hgb stable at 13.4. · Dysphagia · Facial droop: neurology consulted, MRI ordered. · Hypertension · COVID rule out Patient Active Problem List  
Diagnosis Code  S/P colonoscopy Z98.890  
 Mixed hyperlipidemia E78.2  Benign prostatic hyperplasia with weak urinary stream N40.1, R39.12  
 Gastroesophageal reflux disease without esophagitis K21.9  Anxiety F41.9  Benign essential HTN I10  
 Encounter for examination for admission to assisted living facility Z02.2  CVA (cerebral vascular accident) ([facility] [address] I63.9 Plan: · PPI BID · Trend CBC · Aspirin and Lovenox on hold · COVID test pending · No immediate plan for EGD · Patient was discussed with Dr. Ary Smith · Thank you for allowing me to participate in care of [facility] Signed By: Georgiadipika April, [address]   
 2/22/2021  12:17 PM 
  
 
Gastroenterology Attending Physician attestation statement and comments. This patient was seen and examined by me in a face-to-face visit today. I reviewed the medical record including lab work, imaging and other provider notes. I confirmed the history as described above. I spoke to the patient, reviewed the medical record including lab work, imaging and other provider notes. [facility] I formulated an  assessment of this patient and developed a treatment plan.   
  
I agree with the above consultation note. I agree with the history, exam and assessment and plan as outlined in the note. I would like to add the following: Patient was not seen in setting of COVID-19 rule out. Will hold off on repeat EGD at this time. EGD findings yesterday consistent with hiatal hernia and esophagitis - will follow up pathology. Continue PPI. If hematemesis recurs, we can consider repeat endoscopy. Will follow along with you. Jamar Greer MD

## 2021-02-22 NOTE — PROGRESS NOTES
Problem: Mobility Impaired (Adult and Pediatric) Goal: *Acute Goals and Plan of Care (Insert Text) Description: FUNCTIONAL STATUS PRIOR TO ADMISSION: Patient was independent and active without use of DME. 
 
HOME SUPPORT PRIOR TO ADMISSION: Pt questionable historian. Pt initially stated lived with son, then stated he lived in [facility] [place]. Physical Therapy Goals Initiated 2/22/2021 1. Patient will move from supine to sit and sit to supine , scoot up and down, and roll side to side in bed with modified independence within 7 day(s). 2.  Patient will transfer from bed to chair and chair to bed with modified independence using the least restrictive device within 7 day(s). 3.  Patient will perform sit to stand with modified independence within 7 day(s). 4.  Patient will ambulate with modified independence for 300 feet with the least restrictive device within 7 day(s). 5.  Patient will ascend/descend 3 stairs with one handrail(s) with modified independence within 7 day(s). Outcome: Not Met PHYSICAL THERAPY EVALUATION- NEURO POPULATION Patient: Lisabeth Osgood [de-identified][de-identified] y.o. male) Date: 2/22/2021 Primary Diagnosis: CVA (cerebral vascular accident) ([facility] [address] [I63.9] Precautions:   Fall, Other (comment)(droplet plus) ASSESSMENT Based on the objective data described below, the patient presents with decreased activity tolerance, balance deficits, and weakness. CT head demonstrated \"Sequela of chronic small vessel ischemic disease. Small remote lacunar infarct in the right selena\". Pt demonstrated wide IVONE and forward trunk leaning upon standing from EOB. Pt ambulated short distance presenting with increased lateral trunk sway and shuffling gait pattern. Pt limited in mobility 2/2 fatigue and transferred to chair to rest. Pt presents below functional mobility baseline, yet questionable historian. Recommend OOB mobility to chair x 1 assist x RW to prevent further deconditioning.   
 
Current Level of Function Impacting Discharge (mobility/balance): min A for transfers Functional Outcome Measure: The patient scored Total: 8/56 on the [facility] Assessment which is indicative of high fall risk. Other factors to consider for discharge: fall risk, poor insight Patient will benefit from skilled therapy intervention to address the above noted impairments. PLAN : 
Recommendations and Planned Interventions: bed mobility training, transfer training, gait training, therapeutic exercises, neuromuscular re-education, patient and family training/education and therapeutic activities Frequency/Duration: Patient will be followed by physical therapy:  5 times a week to address goals. Recommendation for discharge: (in order for the patient to meet his/her long term goals) Therapy 3 hours per day 5-7 days per week pending progress If pt were to d/c home, would benefit from HHPT and require assistance/supervision for all mobility as pt is a fall risk This discharge recommendation: 
Has not yet been discussed the attending provider and/or case management IF patient discharges home will need the following DME: wheelchair SUBJECTIVE:  
Patient stated I need a shot in my neck.  OBJECTIVE DATA SUMMARY:  
HISTORY:   
Past Medical History:  
Diagnosis Date  Arthritis  Hearing decreased   
 getting worse per spouse  High blood pressure  Melanoma in situ of left shoulder ([facility] [address] 1975  Prostate atrophy   
 takes finesteride Past Surgical History:  
Procedure Laterality Date  HX ROTATOR CUFF REPAIR Right 2011  UPPER GI ENDOSCOPY,BIOPSY  2/21/2021 Personal factors and/or comorbidities impacting plan of care: [facility] Home Situation Home Environment: Private residence # Steps to Enter: 3 Rails to Enter: Yes Hand Rails : Right One/Two Story Residence: Two story # of Interior Steps: 12 Interior Rails: Right Living Alone: No 
Support Systems: Child(jackie) Current DME Used/Available at Home: Robson Alexandr, rolling EXAMINATION/PRESENTATION/DECISION MAKING:  
Critical Behavior: 
Neurologic State: Alert Orientation Level: Oriented to person, Disoriented to place, Disoriented to situation, Disoriented to time Cognition: Decreased command following, Decreased attention/concentration Hearing: 
  
Skin:  intact Edema: none noted Range Of Motion: 
AROM: Generally decreased, functional 
  
  
  
PROM: Generally decreased, functional 
  
  
  
Strength:   
Strength: Generally decreased, functional 
  
   
Coordination: 
Coordination: Generally decreased, functional 
Vision:  
  
Functional Mobility: 
Bed Mobility: 
  
Supine to Sit: Contact guard assistance Scooting: Supervision Transfers: 
Sit to Stand: Minimum assistance; Adaptive equipment; Additional time Stand to Sit: Adaptive equipment; Additional time;Minimum assistance Balance:  
Sitting: Intact Standing: Impaired; With support Standing - Static: Fair Standing - Dynamic : Fair;Constant support Ambulation/Gait Training: 
Distance (ft): 5 Feet (ft) Assistive Device: Walker, rolling Ambulation - Level of Assistance: Contact guard assistance;Minimal assistance; Adaptive equipment; Additional time Gait Abnormalities: Decreased step clearance; Path deviations;Trunk sway increased; Shuffling gait Base of Support: Widened Stance: Weight shift Speed/Shanita: Pace decreased (<100 feet/min); Shuffled; Slow Step Length: Left shortened;Right shortened Swing Pattern: Left asymmetrical;Right asymmetrical 
  
 
 
Functional Measure Ramirez Balance Test: 
 
Sitting to Standin Standing Unsupported: 2 Sitting with Back Unsupported: 3 Standing to Sittin Transfers: 1 Standing Unsupported with Eyes Closed: 1 Standing Unsupported with Feet Together: 0 Reach Forward with Outstretched Arm: 0  Object: 0 Turn to Look Over Shoulders: 0 Turn 360 Degrees: 0 Alternate Foot on Step/Stool: 0 Standing Unsupported One Foot in Front: 0 Stand on One Le Total: 8/56 
  
 
 
56=Maximum possible score;  
0-20=High fall risk 21-40=Moderate fall risk 41-56=Low fall risk Physical Therapy Evaluation Charge Determination History Examination Presentation Decision-Making MEDIUM  Complexity : 1-2 comorbidities / personal factors will impact the outcome/ POC  LOW Complexity : 1-2 Standardized tests and measures addressing body structure, function, activity limitation and / or participation in recreation  MEDIUM Complexity : Evolving with changing characteristics  Other outcome measures sims balance  HIGH Based on the above components, the patient evaluation is determined to be of the following complexity level: LOW Activity Tolerance:  
Fair and requires rest breaks After treatment patient left in no apparent distress:  
Sitting in chair, Call bell within reach and Bed / chair alarm activated COMMUNICATION/EDUCATION:  
The patients plan of care was discussed with: Registered nurse. Patient was educated regarding his deficit(s) of balance and weakness as this relates to his diagnosis of possible CVA. He demonstrated Fair understanding as evidenced by head nodding. Patient and/or family was verbally educated on the BE FAST acronym for signs/symptoms of CVA and TIA. BE FAST was written on patient's communication board  for visual education and reinforcement. All questions answered with patient indicating fair understanding. Fall prevention education was provided and the patient/caregiver indicated understanding., Patient/family have participated as able in goal setting and plan of care. and Patient/family agree to work toward stated goals and plan of care. Thank you for this referral. 
Kenneth Matta, PT , DPT Time Calculation: 22 mins

## 2021-02-22 NOTE — CONSULTS
NEUROLOGY  
INPATIENT EVALUATION/CONSULTATION 
  
 
PATIENT NAME: Ivy Giang MRN: [id number] REASON FOR CONSULTATION: Facial weakness, dysphagia concerning for stroke 02/22/21 HISTORY OF PRESENT ILLNESS: 
Ivy Giang is a an [de-identified]year old RH dominant male admitted to [facility] after he previously was at [facility] for dysphagia having undergone EGD with concern for possible esophagitis. Patient presented to [facility] after his son appreciated left facial weakness as well, unclear when patient was last seen by his son. On presentation was felt to have evidence for a central 7th nerve palsy with concern for stroke. On exam patient admits that he is having trouble swallowing but is inconsistent in his timeline saying it started yesterday at other times saying a few weeks. Does not have any weakness numbness speech or language impairment but does think he had a stroke due to his dysphagia. Denies ever having had a stroke before. Does not provide significant degree of further history. PAST MEDICAL HISTORY: 
Past Medical History:  
Diagnosis Date  Arthritis  Hearing decreased   
 getting worse per spouse  High blood pressure  Melanoma in situ of left shoulder ([facility] [address] 1975  Prostate atrophy   
 takes finesteride PAST SURGICAL HISTORY: 
Past Surgical History:  
Procedure Laterality Date  HX ROTATOR CUFF REPAIR Right 2011  UPPER GI ENDOSCOPY,BIOPSY  2/21/2021 FAMILY HISTORY:  
No family history on file. SOCIAL HISTORY: 
Social History Socioeconomic History  Marital status:  Spouse name: Not on file  Number of children: Not on file  Years of education: Not on file  Highest education level: Not on file Tobacco Use  Smoking status: Never Smoker  Smokeless tobacco: Never Used MEDICATIONS:  
Current Facility-Administered Medications Medication Dose Route Frequency Provider Last Rate Last Admin  atorvastatin (LIPITOR) tablet 40 mg  40 mg Oral QHS Cheryl Faustin MD      
 labetaloL (NORMODYNE;TRANDATE) injection 5 mg  5 mg IntraVENous Q6H PRN Daniela López MD      
 pantoprazole (PROTONIX) 40 mg in 0.9% sodium chloride 10 mL injection  40 mg IntraVENous Q12H Page Mini C, PA   40 mg at 02/22/21 1320  potassium chloride 10 mEq in 50 ml IVPB  10 mEq IntraVENous Q1H Umair Beckford MD 50 mL/hr at 02/22/21 1614 10 mEq at 02/22/21 1614  
 metoprolol succinate (TOPROL-XL) XL tablet 50 mg  50 mg Oral DAILY Lynne Beckford MD   Stopped at 02/22/21 0900  sertraline (ZOLOFT) tablet 50 mg  50 mg Oral DAILY Daniela López MD   Stopped at 02/22/21 0900  [Held by provider] aspirin chewable tablet 81 mg  81 mg Oral DAILY Arpit Beckford MD   Stopped at 02/22/21 0900  
 lactated Ringers infusion  75 mL/hr IntraVENous CONTINUOUS Arpit Beckford MD 75 mL/hr at 02/21/21 2019 75 mL/hr at 02/21/21 2019  sodium chloride (NS) flush 5-40 mL  5-40 mL IntraVENous Q8H Umair Beckford MD   10 mL at 02/22/21 1426  sodium chloride (NS) flush 5-40 mL  5-40 mL IntraVENous PRN Daniela López MD      
 acetaminophen (TYLENOL) tablet 650 mg  650 mg Oral Q6H PRN Umair Beckford MD      
 Or  
 acetaminophen (TYLENOL) suppository 650 mg  650 mg Rectal Q6H PRN Umair Beckford MD      
 polyethylene glycol (MIRALAX) packet 17 g  17 g Oral DAILY PRN Daniela López MD      
 promethazine (PHENERGAN) tablet 12.5 mg  12.5 mg Oral Q6H PRN Umair Beckford MD      
 Or  
 ondansetron (ZOFRAN) injection 4 mg  4 mg IntraVENous Q6H PRN Daniela López MD      
 [Held by provider] enoxaparin (LOVENOX) injection 40 mg  40 mg SubCUTAneous DAILY Lynne Beckford MD   Stopped at 02/22/21 0900 ALLERGIES: 
No Known Allergies REVIEW OF SYSTEMS: 
10 point ROS reviewed with patient and negative except for those listed above. PHYSICAL EXAM: 
Vital Signs:  
Visit Vitals BP (!) 164/95 Pulse 79 Temp 98.3 °F (36.8 °C) Resp 15 Ht 5' 9\" (1.753 m) Wt 135 lb 5.8 oz (61.4 kg) SpO2 94% BMI 19.99 kg/m² Physical exam: 
Pleasant male resting calmly in bed. HEENT appears grossly unremarkable. Neck appears supple. Cardiovascular demonstrates constant S1/S2 regular rhythm. Pulmonary demonstrates equal entry bilaterally. Extremities appear warm/dry. Neurologically patient is oriented to self. Not location but is oriented to year. Attention appears reasonably intact. Speech is clear language appears reasonably fluent. Cranial through 12 are notable for weakness of upper and lower face on the left with flattening of the nasolabial fold. Patient is not able to bury his eyelashes as well the left as compared to the right and does not fully activate frontalis on the left as compared to right. Motorically patient has normal bulk and tone there is no drift. 5 out of 5 strength is noted in upper and lower extremities. Sensation appears grossly intact in upper and lower extremities. Coordination is intact in upper extremities. Remainder examination is deferred. PERTINENT DATA: 
Cholesterol 173, HDL 50,  Hemoglobin A1c is 5.7 CT Results (maximum last 3): MRI Results (maximum last 3): Results from Hospital Encounter encounter on 08/03/19 MRI SHOULDER RT WO CONT Narrative MRI OF THE RIGHT SHOULDER WITHOUT CONTRAST. Clinical Indication: Right shoulder pain for two months after a fall Procedure: Multiplanar and multisequence MR images of the right shoulder were 
performed without gadolinium contrast. 
 
Comparison: No prior Findings:  
 
AC joint: Mild degenerative hypertrophy is appreciated the [facility] joint. Humeral 
head is high riding and abuts the undersurface of the lateral acromion. There is 
fluid signal present in the subacromial/subdeltoid bursa. Rotator cuff: A massive rotator cuff tendon tear is appreciated.  Note, suture 
anchors are present in the greater tuberosity indicative of a prior rotator cuff tendon repair. The supraspinatus and infraspinatus tendons have retracted to the 
glenoid rim indicative of a full-thickness recurrent tear. A full-thickness tear 
is also noted of the subscapularis tendon with only a few inferior fibers 
remaining intact. Fatty atrophy is noted of both the subscapularis and 
supraspinatus tendons. The teres minor tendon remains intact. Biceps labral complex: There is a complete tear with extracapsular retraction of 
the long head of biceps tendon. The superior labrum is intact. The joint capsule 
in the axillary recess is intact. Glenohumeral joint: Small marginal osteophytes are noted off the medial aspect 
of the humeral head-neck junction and off the glenoid rim. Degenerative labral 
tearing is evident. There is a trace joint effusion. No abnormal soft tissue mass noted. Impression IMPRESSION: 
1. Massive recurrent rotator cuff tendon tear with full thickness tearing noted 
of the supraspinatus, infraspinatus, and subscapularis tendons. There is mild 
fatty atrophy of the supraspinatus and subscapularis muscle bellies. 2. 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 
degenerative tearing evident. 3. Complete tear with extracapsular retraction of the long head of biceps 
tendon. 4. Mild degenerative hypertrophy of the [facility] joint. ASSESSMENT:   
 
Aggie Vásquez is an [de-identified]year old right-hand-dominant gentleman with a history of some degree of dementia who was recently at [facility] for swallowing difficulties, where he underwent EGD with no significant diagnosis who subsequently presented to [facility] for dysphagia as well as appearance of left facial weakness RECOMMENDATIONS: 
Left facial weakness, dysphagia: 
Patient presents with a lower motor neuron pattern of weakness as his orbicularis oculi is weak and he has asymmetric elevation of the eyebrow/activation of frontalis While this is more suggestive of a peripheral 7th nerve palsy could still be indicative of an ischemic pontine lesion involving the nucleus perhaps leading to dysphagia as well from involvement of additional structures Patient is on increased dose of atorvastatin as well as aspirin, agree with MRI Echocardiogram is unrevealing as is vessel imaging If MRI is unrevealing as well would continue to pursue nonneurologic causes of his dysphagia and consider his facial weakness as perhaps being indicative of mild Bell's while still entertaining the idea of an MRI negative stroke pending completion of work-up of various causes Neurologically to follow please call questions concerns.  
 
Jeanette Huff MD

## 2021-02-22 NOTE — PROGRESS NOTES
Orders received, chart reviewed and patient evaluated by physical therapy. Pending progression with skilled acute physical therapy, recommend: 
Therapy 3 hours per day 5-7 days per week If pt were to d/c home, would benefit from HHPT and require assistance/supervision for all mobility as pt is a fall risk Recommend with nursing OOB to chair 3x/day and walking daily with 1 assist and walker. Thank you for completing as able in order to maintain patient strength, endurance and independence. Full evaluation to follow.

## 2021-02-22 NOTE — PROGRESS NOTES
Problem: Dysphagia (Adult) Goal: *Acute Goals and Plan of Care (Insert Text) Description: Speech Therapy Goals Initiated 2/22/2021 1. Patient will participate in swallow re-evaluation within 7 days. Outcome: Progressing Towards Goal 
  
SPEECH LANGUAGE PATHOLOGY BEDSIDE SWALLOW EVALUATION Patient: Yelena Brown [de-identified][de-identified] y.o. male) Date: 2/22/2021 Primary Diagnosis: CVA (cerebral vascular accident) ([facility] [address] [I63.9] Precautions: n/a ASSESSMENT : 
Pt given water with SLP on this date and immediately began vomiting blood. Did note that pt had an EGD yesterday that showed esophagitis and a hiatal hernia but no concern for bleeding. However, given presentation would recommend holding PO for now. Will await GI input. Patient will benefit from skilled intervention to address the above impairments. Patients rehabilitation potential is considered to be Fair PLAN : 
Recommendations and Planned Interventions: 
--NPO 
--water swabs if allowed by GI Frequency/Duration: Patient will be followed by speech-language pathology 2 times a week to address goals. Discharge Recommendations: To Be Determined SUBJECTIVE:  
Patient stated, \"Oh no!  right before he vomited blood. OBJECTIVE:  
 
Past Medical History:  
Diagnosis Date Arthritis Hearing decreased   
 getting worse per spouse High blood pressure Melanoma in situ of left shoulder ([facility] [address] 1975 Prostate atrophy   
 takes finesteride Past Surgical History:  
Procedure Laterality Date HX ROTATOR CUFF REPAIR Right 2011 UPPER GI ENDOSCOPY,BIOPSY  2/21/2021 Prior Level of Function/Home Situation:  
Home Situation Home Environment: Private residence # Steps to Enter: 3 Rails to Enter: Yes Hand Rails : Right One/Two Story Residence: Two story # of Interior Steps: 12 Interior Rails: Right Living Alone: No 
Support Systems: Child(jackie) Current DME Used/Available at Home: Walker, rolling Diet prior to admission: regular diet/thin liquids Current Diet:  NPO Cognitive and Communication Status: 
Neurologic State: Alert Orientation Level: Oriented to person, Disoriented to place, Disoriented to situation, Disoriented to time Cognition: Decreased command following, Decreased attention/concentration Oral Assessment: 
Oral Assessment Labial: (did not follow commands) P.O. Trials: 
Patient Position: upright in bed Vocal quality prior to P.O.: Hoarse Consistency Presented: Thin liquid How Presented: Self-fed/presented;Cup/sip Bolus Formation/Control: No impairment Propulsion: No impairment Oral Residue: None Initiation of Swallow: No impairment Laryngeal Elevation: Functional 
Aspiration Signs/Symptoms: (immediately began vomiting) Oral Phase Severity: No impairment Pharyngeal Phase Severity : (unclear) NOMS:  
The NOMS functional outcome measure was used to quantify this patient's level of swallowing impairment. Based on the NOMS, the patient was determined to be at level 1 for swallow function NOMS Swallowing Levels: 
Level 1 (CN): NPO Level 2 (CM): NPO but takes consistency in therapy Level 3 (CL): Takes less than 50% of nutrition p.o. and continues with nonoral feedings; and/or safe with mod cues; and/or max diet restriction Level 4 (CK): Safe swallow but needs mod cues; and/or mod diet restriction; and/or still requires some nonoral feeding/supplements Level 5 (CJ): Safe swallow with min diet restriction; and/or needs min cues Level 6 (CI): Independent with p.o.; rare cues; usually self cues; may need to avoid some foods or needs extra time Level 7 (CH): Independent for all p.o. MARGIE (2003). National Outcomes Measurement System (NOMS): Adult Speech-Language Pathology User's Guide. Pain: 
Pain Scale 1: Numeric (0 - 10) Pain Intensity 1: 0 After treatment:  
Call bell within reach and Nursing notified COMMUNICATION/EDUCATION:  
 
The patient's plan of care including recommendations, planned interventions, and recommended diet changes were discussed with: Registered nurse and Physician. Patient is unable to participate in goal setting and plan of care. Thank you for this referral. 
SPENCER Henry Time Calculation: 10 mins

## 2021-02-22 NOTE — PROGRESS NOTES
Problem: Self Care Deficits Care Plan (Adult) Goal: *Acute Goals and Plan of Care (Insert Text) Description: FUNCTIONAL STATUS PRIOR TO ADMISSION: Patient was independent and active without use of DME. Wife passed away 10 days ago. Patient not sure which instrumental ADLs he will still continue versus his family completing. HOME SUPPORT: The patient lived alone with family to provide assistance s/p wife passing. Occupational Therapy Goals Initiated 2/22/2021 1. Patient will perform standing 2 mins during upper body ADLs with RW with moderate assistance  within 7 day(s). 2.  Patient will perform lower body dressing with minimal assistance/contact guard assist within 7 day(s). 3.  Patient will perform bathing with minimal assistance/contact guard assist within 7 day(s). 4.  Patient will perform toilet transfers with minimal assistance/contact guard assist within 7 day(s). 5.  Patient will perform all aspects of toileting with minimal assistance/contact guard assist within 7 day(s). 6.  Patient will participate in upper extremity therapeutic exercise/activities with light resistance with supervision/set-up within 7 day(s). 7.  Patient will improve their Fugl Rosenthal score by 5 points in prep for ADLs within 7 days. Outcome: Not Met OCCUPATIONAL THERAPY EVALUATION [facility] [de-identified][de-identified] y.o. male) Date: 2/22/2021 Primary Diagnosis: CVA (cerebral vascular accident) ([facility] [address] [I63.9] Precautions:   Fall, Other (comment)(droplet plus); bed alarm ASSESSMENT Based on the objective data described below, the patient presents with decreased sitting balance with right lateral and posterior lean, cardiac tolerance, ability to come to standing long enough to complete ADL, cognition (attention to task, sequencing, processing, safety awareness and application), additional eye movements during visual testing, perseveration during conversation and physically during tasks. Current Level of Function Impacting Discharge (ADLs/self-care): moderate assistance upper body and lower body ADLs, min assistance bed mobility and sit<>stand. Functional Outcome Measure: The patient scored Total A-D Total A-D (Motor Function): 60/66 on the Fugl-Rosenthal Assessment which is indicative of mild impairment in upper extremity functional status. Other factors to consider for discharge: family Patient will benefit from skilled therapy intervention to address the above noted impairments. PLAN : 
Recommendations and Planned Interventions: self care training, functional mobility training, therapeutic exercise, balance training, visual/perceptual training, therapeutic activities, cognitive retraining, endurance activities, neuromuscular re-education, patient education, home safety training, and family training/education Next session: chair alarm Frequency/Duration: Patient will be followed by occupational therapy 5 times a week to address goals. Recommendation for discharge: (in order for the patient to meet his/her long term goals) Therapy 3 hours per day 5-7 days per week to address neuro deficits and medical management prior to discharging home to supportive family 2* patient independent prior. Patient can tolerate 3 hours of therapy. This discharge recommendation: 
Has not yet been discussed the attending provider and/or case management IF patient discharges home will need the following DME: transfer bench and walker: rolling SUBJECTIVE:  
Patient stated my wife passed away 10 days ago from skin cancer.  OBJECTIVE DATA SUMMARY:  
HISTORY:  
Past Medical History:  
Diagnosis Date  Arthritis  Hearing decreased   
 getting worse per spouse  High blood pressure  Melanoma in situ of left shoulder ([facility] [address] 1975  Prostate atrophy   
 takes finesteride Past Surgical History:  
Procedure Laterality Date  HX ROTATOR CUFF REPAIR Right 2011  UPPER GI ENDOSCOPY,BIOPSY  2/21/2021 Expanded or extensive additional review of patient history:  
 
Home Situation Home Environment: Private residence # Steps to Enter: 3 Rails to Enter: Yes Hand Rails : Right One/Two Story Residence: Two story # of Interior Steps: 12 Interior Rails: Right Living Alone: No 
Support Systems: Child(jackie) Current DME Used/Available at Home: Walker, rolling Tub or Shower Type: Tub/Shower combination Hand dominance: Right EXAMINATION OF PERFORMANCE DEFICITS: 
Cognitive/Behavioral Status: 
Neurologic State: Alert Orientation Level: Oriented to person;Disoriented to place; Disoriented to situation;Disoriented to time Cognition: Decreased command following;Decreased attention/concentration 
  
 demonstrated call bell to call nurse during scenarios 2/4. Demonstrated use of t.v. without further instruction. Skin: intact L PIV with bandage over Edema: intact Hearing: 
  
 
Vision/Perceptual:   
Tracking: Requires cues, head turns, or add eye shifts to track Saccades: Additional eye shifts occurred during testing Convergence: Breaks at 7 from nose Diplopia: No   
Acuity: Impaired near vision Corrective Lenses: Reading glasses Range of Motion: 
 
AROM: Generally decreased, functional 
PROM: Generally decreased, functional 
  
  
  
  
  
  
 
Strength: 
 
Strength: Generally decreased, functional 
  
  
  
  
 
Coordination: 
Coordination: Generally decreased, functional 
Fine Motor Skills-Upper: Right Intact; Left Impaired Gross Motor Skills-Upper: Left Intact; Right Intact Tone & Sensation: 
 
  
  
  
  
  
  
  
  
 
Balance: 
Sitting: Impaired; Without support Sitting - Static: Good (unsupported) Sitting - Dynamic: Fair (occasional) Standing: Impaired; With support Standing - Static: Fair Standing - Dynamic : Fair;Constant support Functional Mobility and Transfers for ADLs: 
Bed Mobility: Supine to Sit: Contact guard assistance Sit to Supine: Supervision Scooting: Supervision Transfers: 
Sit to Stand: Minimum assistance; Adaptive equipment; Additional time Stand to Sit: Minimum assistance ADL Assessment: 
Feeding: total assistance: NPO s/p SLP evaluation Oral Facial Hygiene/Grooming: Minimum assistance infer Bathing: Moderate assistance infer Upper Body Dressing: Minimum assistance infer Lower Body Dressing: Moderate assistance tailor sitting, LOB, self corrected, unable to let go of walker once standing and then immediately sitting down Toileting: Moderate assistance infer  
  
 received sitting EOB, patient perseverating on drinking, easily redirected each time to tasks at hand. Patient upon standing tolerated long enough for bed alarm to be placed. ADL Intervention and task modifications: 
  
 
  
 
  
 
  
 
  
 
  
 
  
 
  
 
Therapeutic Exercise: 
  
Functional Measure: 
Fugl-Rosenthal Assessment of Motor Recovery after Stroke:  
Reflex Activity Flexors/Biceps/Fingers: Can be elicited Extensors/Triceps: Can be elicited Reflex Subtotal: 4 Volitional Movement Within Synergies Shoulder Retraction: Full Shoulder Elevation: Full Shoulder Abduction (90 degrees): Full Shoulder External Rotation: Full Elbow Flexion: Full Forearm Supination: Full Shoulder Adduction/Internal Rotation: Full Elbow Extension: Full Forearm Pronation: Full Subtotal: 18 
 
Volitional Movement Mixing Synergies Hand to Lumbar Spine: Full Shoulder Flexion (0-90 degrees): Full Pronation-Supination: Full Subtotal: 6 Volitional Movement With Little or No Synergy Shoulder Abduction (0-90 degrees): Full Shoulder Flexion ( degrees): Full Pronation/Supination: Full Subtotal : 6 Normal Reflex Activity Biceps, Triceps, Finger Flexors: Full Subtotal : 2 Upper Extremity Total  
Upper Extremity Total: 36 Wrist 
Stability at 15 Degree Dorsiflexion: Full Repeated Dorsiflexion/ Volar Flexion: Full Stability at 15 Degree Dorsiflexion: Full Repeated Dorsiflexion/ Volar Flexion: Full Circumduction: Full Wrist Total: 10 Hand Mass Flexion: Full Mass Extension: Full Grasp A: Full Grasp B: Partial 
Grasp C: Partial 
Grasp D: Full Grasp E: Full Hand Total: 12 Coordination/Speed Tremor: Slight Dysmetria: Slight Time: >5s Coordination/Speed Total : 2 Total A-D Total A-D (Motor Function): 60/66 This is a reliable/valid measure of arm function after a neurological event. It has established value to characterize functional status and for measuring spontaneous and therapy-induced recovery; tests proximal and distal motor functions. Fugl-Rosenthal Assessment  UE scores recorded between five and 30 days post neurologic event can be used to predict UE recovery at six months post neurologic event. Severe = 0-21 points Moderately Severe = 22-33 points Moderate = 34-47 points Mild = 48-66 points RADHA Donald, GIO De Leon, & BENJY Sommer (1992). Measurement of motor recovery after stroke: Outcome assessment and sample size requirements. Stroke, 23, pp. 8400-4494.  
------------------------------------------------------------------------------------------------------------------------------------------------------------------ MCID: 
Stroke: Ronnell More al, 2001; n = 171; mean age 79 (6) years; assessed within 16 (12) days of stroke, Acute Stroke) FMA Motor Scores from Admission to Discharge  
? 10 point increase in FMA Upper Extremity = 1.5 change in discharge FIM ? 10 point increase in FMA Lower Extremity = 1.9 change in discharge FIM 
MDC:  
Stroke:  
Sweetie Navarrete et al, 2008, n = 14, mean age = 59.9 (14.6) years, assessed on average 14 (6.5) months post stroke, Chronic Stroke) ? FMA = 5.2 points for the Upper Extremity portion of the assessment Occupational Therapy Evaluation Charge Determination History Examination Decision-Making Based on the above components, the patient evaluation is determined to be of the following complexity level:  
Pain Rating: 
 
 
Activity Tolerance:  
requires rest breaks and hypertension After treatment patient left in no apparent distress:   
Supine in bed, Call bell within reach, Bed / chair alarm activated, and Side rails x 3 
 
COMMUNICATION/EDUCATION:  
The patients plan of care was discussed with: Physical therapist and Registered nurse. Patient was educated regarding his deficit(s) of decreased coordination, dysmetria noted, sitting and standing balance, as this relates to his diagnosis of work up CVA. He demonstrated Good understanding as evidenced by repeat back \"walker would be good\". Home safety education was provided and the patient/caregiver indicated understanding., Patient/family have participated as able in goal setting and plan of care. , and Patient/family agree to work toward stated goals and plan of care. This patients plan of care is appropriate for delegation to [facility]. Thank you for this referral. 
Geovanny Islam Time Calculation: 17 mins

## 2021-02-22 NOTE — PROGRESS NOTES
TRANSITIONS OF CARE PLAN:  
1. DESTINATION: TBD: potential return to [address] [place] [address] (executive directo: Araceli Najjar: [phone]) 2. TRANSPORT: TBD: family vs BLS 3. ADDITIONAL SUPPORT: Son, Daughter 4. DME: Dentures, 2x Hearing Aids (doesn't use) 5. HOME HEALTH: TBD 6. CODE STATUS/AMD STATUS: Full Code; no AMD - children are LNOK (spouse  3/19/2020) - plan for ACP discussion with both children  at 0930    
7. FOLLOW UP APPOINTMENTS: PCP, GI 
8. STILL NEEDS: Brain MRI, GI Consult, IVF, Neuro Consult, PT, OT, Diet Advancement with toleration, Reason for Admission:   CVA RUR Score:          12% Plan for utilizing home health:      TBD 
 
PCP: First and Last name:  Dr. Nathan Hoffman Name of Practice: [facility] Are you a current patient: Yes/No: Yes Approximate date of last visit: 1 month Can you participate in a virtual visit with your PCP: no - seen at [facility] Current Advanced Directive/Advance Care Plan: Full Code; doesn't have AMD - children are LNOK - ACP discussion planned for  at 0930 with both children Healthcare Decision Maker:  
Click here to complete [address] including selection of the Healthcare Decision Maker Relationship (ie \"Primary\") Transition of Care Plan:  CM contacted patient's son Lilli Suarez: [phone]) who identified that patient has a hx of dementia. At baseline, patient is oriented to self and place. Patient has been a resident of [address] [place] [address] for about 1 year. Patient is fairly independent still and lives alone with his dog in a 2nd floor apartment; patient is assisted with medication administration and meal assistance. Patient's spouse passed 3/19/2020. Patient does not have an AMD, but both of his adult children live in the general area. CM will plan to complete ACP discussion with both children  at around 0930.    
 
CM contacted [facility] (, Abel Pop; Anuradha Madrid Tal: [phone]) and left a message requesting a return call. Medicare pt has received, reviewed, and signed IM letter informing them of their right to appeal the discharge. Signed copy has been placed on pt bedside chart. Care Management Interventions PCP Verified by CM: Yes(followed by Dr. Prasad Ragsdale) Palliative Care Criteria Met (RRAT>21 & CHF Dx)?: No 
Mode of Transport at Discharge: Other (see comment) Transition of Care Consult (CM Consult): Discharge Planning MyChart Signup: Yes Discharge Durable Medical Equipment: No(has full dentures, 2x hearing aids) Health Maintenance Reviewed: Yes(CM spoke with patient's son by phone) Physical Therapy Consult: Yes Occupational Therapy Consult: Yes Speech Therapy Consult: Yes Current Support Network: Assisted Living(resident of [address] [place] [address]) Confirm Follow Up Transport: Family(independent in ADLs, family transports) Discharge Location Discharge Placement: Unable to determine at this time(lives alone in a 2nd floor [place] apartment, elevator access) CRM: Mouna Aquino, MPH, [address] [place] [address]; Z: [phone]

## 2021-02-22 NOTE — PROGRESS NOTES
Problem: Falls - Risk of 
Goal: *Absence of Falls Description: Document Cynthia Hermosillo Fall Risk and appropriate interventions in the flowsheet. Outcome: Progressing Towards Goal 
Note: Fall Risk Interventions: 
  
 
Mentation Interventions: Bed/chair exit alarm, Eyeglasses and hearing aids, More frequent rounding, Room close to nurse's station, Reorient patient Ensure patient alarms are in place and continue to provide active rounding due to patient confusion Problem: Pressure Injury - Risk of 
Goal: *Prevention of pressure injury Description: Document Edin Scale and appropriate interventions in the flowsheet. Outcome: Progressing Towards Goal 
Note: Pressure Injury Interventions: 
  
 
  
 
  
 
Mobility Interventions: HOB 30 degrees or less, Pressure redistribution bed/mattress (bed type) Nutrition Interventions: Discuss nutritional consult with provider

## 2021-02-22 NOTE — ROUTINE PROCESS
TRANSFER - OUT REPORT: 
 
Verbal report given to Kinga (name) on Nasim Rincon  being transferred to [facility] (unit) for routine progression of care Report consisted of patients Situation, Background, Assessment and  
Recommendations(SBAR). Information from the following report(s) SBAR, ED Summary and Recent Results was reviewed with the receiving nurse. Lines:  
Peripheral IV 02/21/21 Left Forearm (Active) Site Assessment Clean, dry, & intact 02/21/21 2346 Phlebitis Assessment 0 02/21/21 2346 Infiltration Assessment 0 02/21/21 2346 Dressing Status Clean, dry, & intact 02/21/21 2346 Dressing Type Transparent 02/21/21 2346 Hub Color/Line Status Pink;Flushed;Patent 02/21/21 2346 Opportunity for questions and clarification was provided. Patient transported with: 
 [employer]

## 2021-02-22 NOTE — PROGRESS NOTES
[address] visit. Mr. Gatito Siddiqui is [address]. He is unable to receive communion due to medical restrictions. Prayer for spiritual communion offered outside his room. SHERRI Rao, RN, ACSW, LCSW  Page:  [phone])

## 2021-02-23 NOTE — PROGRESS NOTES
2/23/2021 -  
TANNER: 
- RUR: 14% 
- Disposition is TBD: potential return to [address] [place] [address] - Patient has been agitated and confused and is now in soft wrist restraints 
- NOTE: patient will need to be out of restraints for at least 24 hours for any sort of placement 
- PT and OT pending - Brain MRI is pending CRM: Jenni Cid, MPH, [facility]; Z: [phone] 
 
14:05 - 
CM completed ACP discussion with patient's children and received patient's AMD and Living Will be email; printed and placed for scanning. CRM: Jenni Cid, MPH, [address] [place] [address]; Z: [phone]

## 2021-02-23 NOTE — PROGRESS NOTES
Provided pastoral care visit to [facility] patient. Did not include sacramental care. Gato Ramsay

## 2021-02-23 NOTE — ACP (ADVANCE CARE PLANNING)
Advance Care Planning Advance Care Planning Activator (Inpatient) Conversation Note Date of ACP Conversation: 02/23/21 Conversation Conducted with:   Patient with Decision Making Capacity and Healthcare Decision Maker: Named in Advance Directive or Healthcare Power of [employer] ACP Activator: Parish Bedoya Health Care Decision Maker: 
 
Current Designated Health Care Decision Maker:  
  Primary Decision Maker: Alfrieda Frankel Child - [phone] Primary Decision Maker: Josefa Morillo - [phone] Click here to complete [place] including selection of the Healthcare Decision Maker Relationship (ie \"Primary\") Today we documented Decision Maker(s) consistent with Legal Next of Kin hierarchy. Care Preferences Ventilation: \"If you were in your present state of health and suddenly became very ill and were unable to breathe on your own, what would your preference be about the use of a ventilator (breathing machine) if it were available to you? \" If patient would desire the use of a ventilator (breathing machine), answer \"yes\", if not \"no\":no \"If your health worsens and it becomes clear that your chance of recovery is unlikely, what would your preference be about the use of a ventilator (breathing machine) if it were available to you? \" Would the patient desire the use of a ventilator (breathing machine)? NO Resuscitation \"CPR works best to restart the heart when there is a sudden event, like a heart attack, in someone who is otherwise healthy. Unfortunately, CPR does not typically restart the heart for people who have serious health conditions or who are very sick. \" 
 
 \"In the event your heart stopped as a result of an underlying serious health condition, would you want attempts to be made to restart your heart (answer \"yes\" for attempt to resuscitate) or would you prefer a natural death (answer \"no\" for do not attempt to resuscitate)? \" no 
 
 
[x] Yes  [] No   Educated Patient / Ayaz Public regarding differences between Advance Directives and portable DNR orders. Length of ACP Conversation in minutes:  10 Conversation Outcomes: 
[x] ACP discussion completed [x] Existing advance directive reviewed with patient; no changes to patient's previously recorded wishes 
 
 [] New Advance Directive completed 
 [] Portable Do Not Resuscitate prepared for Provider review and signature 
[] POLST/POST/MOLST/MOST prepared for Provider review and signature Follow-up plan:   
[] Schedule follow-up conversation to continue planning 
[] Referred individual to Provider for additional questions/concerns  
[] Advised patient/agent/surrogate to review completed ACP document and update if needed with changes in condition, patient preferences or care setting  
 
[] This note routed to one or more involved healthcare providers

## 2021-02-23 NOTE — PROGRESS NOTES
Updated pt's son, Jarrell Tran, on pt's condition. Pt jumping out of bed and confused. States Ramon Tang sees a truck in his room and his friend is coming to visit. \" Educated pt about fall risk. Bed alarm on.  
 
0135 Called MRI to see when pt can come down. MRI tech was unware and reported she will give me call when ready. MRI screening form faxed. Pt resting comfortably. 0300 Oral care performed. 0700 Called pt's son to updated on restraints order. Pt is a fall risk. Very confused and hallucinating, while very weak.  
 
0715 MRI called asking if the patient can lay still for the whole procedure. . Will reeval pt's condition. Pt still on restraints and very restless Bedside shift change report given to [address] [place] [address] (oncoming nurse) by Nelda Hobbs (offgoing nurse). Report included the following information SBAR, Intake/Output, MAR, Recent Results and Cardiac Rhythm SR Sybil Lou

## 2021-02-23 NOTE — PROGRESS NOTES
[facility] Hospitalist Progress Note Sariah Roe MD 
Answering service: [phone] OR [phone] from in house phone Progress Note Patient: Travis Record MRN: [id number]  SSN: [id number] YOB: 1940  Age: [de-identified] y.o. Sex: male Admit Date: 2/21/2021 LOS: 2 days Subjective:  
 
I have obtained more history from patient's son over the phone. Per report from the sun, patient has dementia and lives at an assisted living facility. He was doing fine until he swallow a jesse on 02/20 and complained of feeling stuck in the throat. He was evaluated at [facility] and EGD done on 02/21 showing esophagitis but no other acute finding. He was discharged to home yesterday and son noticed left facial droop and brought him to ER here. Nursing reports patient agitated overnight and try to get out of bed. Objective:  
 
Vitals:  
 02/23/21 0712 02/23/21 0800 02/23/21 1135 02/23/21 1152 BP: (!) 188/98  (!) 182/103 Pulse: 90 100  92 Resp: 18 Temp: 99.1 °F (37.3 °C) SpO2: 95% Weight:      
Height:      
  
 
Intake and Output: 
Current Shift: No intake/output data recorded. Last three shifts: 02/21 1901 - 02/23 0700 In: 1124 [I.V.:1124] Out: 150 [Urine:150] Physical Exam:  
GENERAL: Somewhat sedated this a.m. THROAT & NECK: normal and no erythema or exudates noted. LUNG: clear to auscultation bilaterally HEART: regular rate and rhythm, S1, S2 normal, no murmur, click, rub or gallop ABDOMEN: soft, non-tender. Bowel sounds normal. No masses,  no organomegaly EXTREMITIES:  extremities normal, atraumatic, no cyanosis or edema SKIN: no rash or abnormalities NEUROLOGIC: Sedated PSYCHIATRIC: non focal 
 
Lab/Data Review: All lab results for the last 24 hours reviewed. Recent Results (from the past 24 hour(s)) ECHO ADULT COMPLETE  
 Collection Time: 02/22/21  4:29 PM  
Result Value Ref Range Triscuspid Valve Regurgitation Peak Gradient 29.03 mmHg  
 TR Max Velocity 269.40 cm/s Imaging: 
 
  
 
Assessment and Plan:  
 
Facial droop 
-Initial CT perfusion and CTA of the head and neck unremarkable 
-Neurology evaluated the patient and recommended no TPA 
-MRI of the brain pending 
-Echocardiogram showing EF of 50 to 55% 
-Neurology evaluating the patient 
-[facility] for now [facility] 
-EGD report 02/21 shows esophagitis otherwise unremarkable 
-Need to rule out CVA 
-Speech therapy evaluated the patient and patient to remain n.p.o. Hematamesis 
-Noted today while speech evaluating the patient 
-EGD report from 02/21 as above 
-Hold ASA and lovenox 
-GI evaluated the patient no recurrence of hematemesis and H&H stable 
-No recurrence of hematemesis and H&H stable 
  
Hypertension 
-Hold oral medications for now 
-Labetalol as needed for systolic blood pressure more than 165 
-Start schedule Lopressor 5 mg IV every 8 hour 
  
Leukocytosis 
-Of unclear etiology, CXR with reticular interstitial opacities, negative for COVID 
-UA unremarkable 
  
Hyponatremia 
-Patient is clinically dehydrated, likely hypovolemic hyponatremia 
-Continue IV fluid and monitor sodium level 
  
Hypokalemia 
-Also likely due to poor p.o. intake 
-Replete potassium 
  
Abnormal blood sugars 
-Check hemoglobin A1c Disposition plan: Unstable for dc, MRI pending and patient is still dysphagic. Signed By: Sariah Roe MD   
 February 23, 2021

## 2021-02-23 NOTE — PROGRESS NOTES
Unable to locate nurse at this time secondary to being in droplet precaution rooms. Met with Patient. Patient is still agitated and confused. On soft wrist restraints. Will follow up for PT evaluation as appropriate and as time allows. Recommendation for discharge: (in order for the patient to meet his/her long term goals) Therapy 3 hours per day 5-7 days per week pending progress If pt were to d/c home, would benefit from HHPT and require assistance/supervision for all mobility as pt is a fall risk Mirna Balderrama PT, DPT Geriatric Clinical Specialist

## 2021-02-23 NOTE — PROGRESS NOTES
[address] [facility] Adult  Hospitalist Group Advance Care Planning Note Name: Lacey Cisneros YOB: 1940 MRN: [id number] Admission Date: 2/21/2021  3:15 PM 
 
Date of discussion: 2/23/2021 Active Diagnoses: 
 
Hospital Problems  Date Reviewed: 4/2/2020 Codes Class Noted POA  
 CVA (cerebral vascular accident) ([facility] [address] ICD-10-CM: I63.9 ICD-9-CM: 434.91  2/21/2021 Unknown These active diagnoses are of sufficient risk that focused discussion on advance care planning is indicated in order to allow the patient to thoughtfully consider personal goals of care, and if situations arise that prevent the ability to personally give input, to ensure appropriate representation of their personal desires for different levels and aggressiveness of care. Discussion:  
 
Persons present and participating in discussion: Gaanastasiia FunkjeromeAttila MD, Steven Tapia (daughter) Topics Discussed: 
Patient's medical condition and diagnosis: [  ] yes [  ] no  
Surrogate decision maker: [  ] yes [  ] no Patient's current physical function/cognitive function/frailty: [  ] yes [  ] no Code Status: [  ] yes [  ] no  
Artificial Nutrition / Dialysis / Non-Invasive Ventilation / Blood Transfusion: [  ] yes [  ] no Potential Resources for home (durable medical equipment, home nursing, home O2): [  ] yes [  ] no Overview of Discussion:  
Discussed with patient's daughter, Steven Tapia, regarding patient's current medical issue and prognosis. Discussed with her regarding patient's wishes for regurgitation. Explained resuscitative efforts including chest compression, IV medications, shocking the heart, intubation and mechanical ventilation. Patient's daughter states that patient wishes to be a DNR. Patient's daughter, Steven Tapia, will be the primary contact and medical decision-maker.  
 
Time Spent:  
 
Total time spent face-to-face in education and discussion: 16 minutes.   
 
Romeo Mejia MD 
Date of Service:  2/23/2021 
4:26 PM

## 2021-02-23 NOTE — PROGRESS NOTES
Occupational Therapy Unable to locate nurse at this time secondary to being in droplet precaution rooms. Per chart/PT note, Patient remains still agitated and confused. On soft wrist restraints. Will follow up for OT treatment as appropriate. Thank you for the opportunity to participate in this pts plan of care Nirmala LACKEY/NATHANIEL

## 2021-02-23 NOTE — PROGRESS NOTES
[facility] 
[address] [facility], [address] GI PROGRESS NOTE Nura Good, [facility] office [phone] NP in-hospital cell phone M-F until 4:30 After 5pm or on weekends, please call  for physician on call NAME: Sherly Baca :  1940 MRN:  [id number] Subjective:  
Patient has been agitated/confused. He complains of not being able to clear his throat. Discussed with RN, no further hematemesis, he was suctioned to help clear airway and scant bleeding noted. Objective: VITALS:  
Last 24hrs VS reviewed since prior progress note. Most recent are: 
Visit Vitals BP (!) 188/98 (BP 1 Location: Left arm, BP Patient Position: At rest) Pulse 90 Temp 99.1 °F (37.3 °C) Resp 18 Ht 5' 9\" (1.753 m) Wt 61.4 kg (135 lb 5.8 oz) SpO2 95% BMI 19.99 kg/m² PHYSICAL EXAM: 
General: Cooperative, no acute distress   
Neurologic:  Alert and confused HEENT: EOMI, no scleral icterus Lungs:  No increased WOB Heart:  Regular rate Abdomen: Soft, non-distended, no tenderness. Extremities: warm Psych:   Poor insight. anxious Lab Data Reviewed:  
 
Recent Results (from the past 24 hour(s)) CBC WITH AUTOMATED DIFF Collection Time: 21 10:51 AM  
Result Value Ref Range WBC 15.7 (H) 4.1 - 11.1 K/uL  
 RBC 4.27 4. 10 - 5.70 M/uL  
 HGB 13.4 12.1 - 17.0 g/dL HCT 40.3 36.6 - 50.3 % MCV 94.4 80.0 - 99.0 FL  
 MCH 31.4 26.0 - 34.0 PG  
 MCHC 33.3 30.0 - 36.5 g/dL  
 RDW 13.4 11.5 - 14.5 % PLATELET 808 042 - 737 K/uL MPV 9.9 8.9 - 12.9 FL  
 NRBC 0.0 0  WBC ABSOLUTE NRBC 0.00 0.00 - 0.01 K/uL NEUTROPHILS 85 (H) 32 - 75 % LYMPHOCYTES 8 (L) 12 - 49 % MONOCYTES 7 5 - 13 % EOSINOPHILS 0 0 - 7 % BASOPHILS 0 0 - 1 % IMMATURE GRANULOCYTES 0 0.0 - 0.5 % ABS. NEUTROPHILS 13.3 (H) 1.8 - 8.0 K/UL  
 ABS. LYMPHOCYTES 1.2 0.8 - 3.5 K/UL  
 ABS. MONOCYTES 1.1 (H) 0.0 - 1.0 K/UL  
 ABS.  EOSINOPHILS 0.0 0.0 - 0.4 K/UL  
 ABS. BASOPHILS 0.0 0.0 - 0.1 K/UL  
 ABS. IMM. GRANS. 0.1 (H) 0.00 - 0.04 K/UL  
 DF AUTOMATED METABOLIC PANEL, BASIC Collection Time: 02/22/21 10:51 AM  
Result Value Ref Range Sodium 143 136 - 145 mmol/L Potassium 3.2 (L) 3.5 - 5.1 mmol/L Chloride 109 (H) 97 - 108 mmol/L  
 CO2 28 21 - 32 mmol/L Anion gap 6 5 - 15 mmol/L Glucose 98 65 - 100 mg/dL BUN 19 6 - 20 MG/DL Creatinine 1.02 0.70 - 1.30 MG/DL  
 BUN/Creatinine ratio 19 12 - 20 GFR est AA >60 >60 ml/min/1.73m2 GFR est non-AA >60 >60 ml/min/1.73m2 Calcium 8.8 8.5 - 10.1 MG/DL  
COAGULATION SCREEN Collection Time: 02/22/21 10:51 AM  
Result Value Ref Range Fibrinogen VERIFIED BY DILUTION 200 - 475 mg/dL INR 1.1 0.9 - 1.1 Prothrombin time 11.5 (H) 9.0 - 11.1 sec  
 aPTT 29.2 22.1 - 31.0 sec  
 aPTT, therapeutic range     58.0 - 77.0 SECS  
ECHO ADULT COMPLETE Collection Time: 02/22/21  4:29 PM  
Result Value Ref Range Triscuspid Valve Regurgitation Peak Gradient 29.03 mmHg  
 TR Max Velocity 269.40 cm/s Assessment:  
· Hematemesis: EGD (2/21/21) minimal distal esophageal erythema (redness) to suggest possible esophagitis (biopsied); no obstruction of the esophageus noted - no retained foreign body, stricture, or ulcer; 3 cm hiatal hernia; fluid-filled stomach with normal mucosa noted; normal duodenum. Hgb stable at 13.4. · Dysphagia · Facial droop: neurology consulted, MRI ordered. · Hypertension · COVID negative Patient Active Problem List  
Diagnosis Code  S/P colonoscopy Z98.890  
 Mixed hyperlipidemia E78.2  Benign prostatic hyperplasia with weak urinary stream N40.1, R39.12  
 Gastroesophageal reflux disease without esophagitis K21.9  Anxiety F41.9  Benign essential HTN I10  
 Encounter for examination for admission to assisted living facility Z02.2  CVA (cerebral vascular accident) ([facility] [address] I63.9 Plan:  
· BID PPI, add carafate · Monitor CBC · Diet per SLP · Neurology work-up pending · Will be available to see again on request, outpatient follow-up with Dr. Armand De Leon Signed By: Hank Martinez NP   
 2/23/2021  9:04 AM 
  
 
 This patient was seen and examined by me in a face-to-face visit today. I reviewed the medical record including lab work, imaging and other provider notes. I confirmed the interval history as described above. I spoke to the patient, discussing our findings and plans. I discussed this case in detail with Ana Lilia Agudelo NP. I formulated an updated  assessment of this patient and guided our treatment plan. I agree with the above progress note. I agree with the history, exam and assessment and plan as outlined in the note. I would like to add the following: Patient seen and examined. Agree with medical management. Will defer repeat endoscopy at this time. Will sign off for now. Please call with any questions. Jamar Almanza MD

## 2021-02-23 NOTE — PROGRESS NOTES
Problem: Dysphagia (Adult) Goal: *Acute Goals and Plan of Care (Insert Text) Description: Speech Therapy Goals Initiated 2/22/2021 1. Patient will participate in swallow re-evaluation within 7 days. Outcome: Progressing Towards Goal 
 SPEECH LANGUAGE PATHOLOGY DYSPHAGIA TREATMENT Patient: Yari Godinez [de-identified][de-identified] y.o. male) Date: 2/23/2021 Diagnosis: [facility] [I63.9] <principal problem not specified> Precautions:   Fall, Other (comment)(droplet plus) ASSESSMENT: 
Patient continues with significant oropharyngeal dysphagia. He holds a bolus in his mouth with his neck hyperextended, while swishing. No pharyngeal movement is appreciated and the patient then demonstrates significant coughing. Suspect his mentation and overall awareness is limiting his tolerance of PO. PLAN: 
Recommendations and Planned Interventions: NPO with oral care 
following Patient continues to benefit from skilled intervention to address the above impairments. Continue treatment per established plan of care. Discharge Recommendations: To Be Determined SUBJECTIVE:  
Patient stated I need to sit up. OBJECTIVE:  
Cognitive and Communication Status: 
Neurologic State: Alert, Confused Orientation Level: Oriented to person, Disoriented to place, Disoriented to situation, Disoriented to time Cognition: Decreased command following Perseveration: No perseveration noted Dysphagia Treatment: 
Oral Assessment: 
Oral Assessment Labial: Right droop Oral Hygiene: dry P.O. Trials: 
Patient Position: upright in bed Vocal quality prior to P.O.: Low volume Consistency Presented: Ice chips; Thin liquid How Presented: Spoon;SLP-fed/presented Bolus Acceptance: Impaired Bolus Formation/Control: Impaired Type of Impairment: Delayed;Poor Propulsion: Delayed (# of seconds) Aspiration Signs/Symptoms: Strong cough Oral Phase Severity: Moderate-severe Pharyngeal Phase Severity : Moderate-severe Pain: 
Pain Scale 1: Numeric (0 - 10) Pain Intensity 1: 0 After treatment:  
Patient left in no apparent distress in bed, Call bell within reach, and Nursing notified COMMUNICATION/EDUCATION:  
Patient was educated regarding role of SLP. He did not seem to understand. The patient's plan of care including recommendations, planned interventions, and recommended diet changes were discussed with: Registered nurse. SPENCER Yen Time Calculation: 20 mins

## 2021-02-24 NOTE — PROGRESS NOTES
Bedside and Verbal shift change report given to Diamond RN (oncoming nurse) by Inge Anguiano (offgoing nurse). Report included the following information SBAR, Kardex, Intake/Output, MAR, Recent Results, Cardiac Rhythm nsr and Dual Neuro Assessment.

## 2021-02-24 NOTE — PROGRESS NOTES
[address] [facility] visit. Mr. Jeanie Craft was sound asleep and had no family member with him. He is [place]. Prayer for spiritual communion offered at bedside. SHERRI Mack, RN, ACSW, LCSW  Page:  [phone]

## 2021-02-24 NOTE — PROGRESS NOTES
Problem: Mobility Impaired (Adult and Pediatric) Goal: *Acute Goals and Plan of Care (Insert Text) Description: FUNCTIONAL STATUS PRIOR TO ADMISSION: Patient was independent and active without use of DME. 
 
HOME SUPPORT PRIOR TO ADMISSION: Pt questionable historian. Pt initially stated lived with son, then stated he lived in [facility]. Physical Therapy Goals Initiated 2/22/2021 1. Patient will move from supine to sit and sit to supine , scoot up and down, and roll side to side in bed with modified independence within 7 day(s). 2.  Patient will transfer from bed to chair and chair to bed with modified independence using the least restrictive device within 7 day(s). 3.  Patient will perform sit to stand with modified independence within 7 day(s). 4.  Patient will ambulate with modified independence for 300 feet with the least restrictive device within 7 day(s). 5.  Patient will ascend/descend 3 stairs with one handrail(s) with modified independence within 7 day(s). Outcome: Progressing Towards Goal 
 PHYSICAL THERAPY TREATMENT Patient: Nasim Rincon [de-identified][de-identified] y.o. male) Date: 2/24/2021 Diagnosis: CVA (cerebral vascular accident) ([facility] [address] [I63.9] <principal problem not specified> Precautions: Fall Chart, physical therapy assessment, plan of care and goals were reviewed. ASSESSMENT Patient continues with skilled PT services and is slowly progressing towards goals. Patient received supine in bed with bilateral soft wrist restraints. Pt sleeping soundly and difficult to maintain arousal and eyes open. Pt with increased confusion and lethargy. Pt is limited by generalized weakness, decreased functional mobility, impaired standing balance and gait, ? L inattention ? Visual impairment, and impaired coordination. Pt required min A supine to sit EOB and additional time and effort. Pt performed sit<>stand with min-mod A x 2 with bilateral HHA.  Noted increased posterior trunk leaning during static standing and required verbal and tactile cues for anterior weight shifting. Pt side stepped along EOB. Pt returned to supine position and immediately went back to sleep and snoring loudly. Bilateral soft wrist restraints and bed alarm placed. Recommend inpatient rehab pending progress and tolerance to acute therapies. 
 
Current Level of Function Impacting Discharge (mobility/balance): min A supine<>sit, min-mod A x 2 sit<>stand with bilateral HHA and side stepped along EOB; limited by lethargy, confusion 
 
Other factors to consider for discharge: lethargy, confusion, previously independent 
    
 
PLAN : 
Patient continues to benefit from skilled intervention to address the above impairments.  Continue treatment per established plan of care. 
to address goals. 
 
Recommendation for discharge: (in order for the patient to meet his/her long term goals) 
Therapy 3 hours per day 5-7 days per week 
 
This discharge recommendation: 
Has been made in collaboration with the attending provider and/or case management 
 
IF patient discharges home will need the following DME: to be determined (TBD)  
 
 
SUBJECTIVE:  
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 
 
OBJECTIVE DATA SUMMARY:  
Critical Behavior: 
Neurologic State: Confused, Eyes open to voice, Lethargic 
Orientation Level: Oriented to person, Disoriented to place, Disoriented to situation, Disoriented to time 
Cognition: Poor safety awareness, Follows commands 
  
Functional Mobility Training: 
Bed Mobility: 
  
Supine to Sit: Minimum assistance 
Sit to Supine: Minimum assistance 
Scooting: Stand-by assistance;Additional time 
  
  
Transfers: 
Sit to Stand: Minimum assistance;Assist x2(posterior trunk leaning) 
Stand to Sit: Minimum assistance;Assist x2 
     
  
     
  
  
  
  
Balance: 
Sitting: Impaired 
Sitting - Static: Good (unsupported) 
Sitting - Dynamic: Fair (occasional) 
Standing: Impaired;With support 
Standing - Static:  Constant support;Poor Standing - Dynamic : Constant support;Poor Ambulation/Gait Training: 
Distance (ft): 3 Feet (ft) Assistive Device: Gait belt(bilateral HHA) Ambulation - Level of Assistance: Assist x2;Minimal assistance; Moderate assistance(bilateral HHA) Gait Abnormalities: Decreased step clearance;Trunk sway increased; Shuffling gait(posterior trunk lean) Base of Support: Widened Speed/Shanita: Pace decreased (<100 feet/min); Shuffled Step Length: Right shortened;Left shortened Stairs: Therapeutic Exercises:  
 
Pain Rating: 
Pt denied pain and appeared to be in no apparent distress Activity Tolerance:  
Fair After treatment patient left in no apparent distress:  
Supine in bed, Call bell within reach, Bed / chair alarm activated, Side rails x 3, and Restraints COMMUNICATION/COLLABORATION:  
The patients plan of care was discussed with: Occupational therapist and Registered nurse. Evelyn Herzog, PT, DPT Time Calculation: 19 mins

## 2021-02-24 NOTE — PROGRESS NOTES
TRANSFER - OUT REPORT: 
 
Verbal report given to Tai(name) on Effie Encarnacion  being transferred to [facility](unit) for routine progression of care Report consisted of patients Situation, Background, Assessment and  
Recommendations(SBAR). Information from the following report(s) SBAR, Kardex, Intake/Output, MAR, Recent Results, Med Rec Status, Cardiac Rhythm SR and Pre Procedure Checklist was reviewed with the receiving nurse. Opportunity for questions and clarification was provided. Patient transported with: 
 [employer]

## 2021-02-24 NOTE — PROGRESS NOTES
Transition of Care Plan 
? RUR- 18% Low 
? DISPOSITION: TBD- IPR vs SNF vs Return to [facility] 
? Transport: family vs. BLS 
 
PABLITO left message for DON (DON: Lee Ann: [phone]) at [facility] to discuss patient's level of care at [facility] and current PT recommendations. Cintia stated that patient was independent before; lived with his dog at [facility].  
 
2:17 PM: PABLITO spoke with patient's son, Quintin and daughter, Frances regarding level of care at [facility].  
Patient was independent prior to hospitalization; was a care level 1. [facility] has a partner PT/OT/SP they would use, could work with patient 5x weekly for 1 hour a day. Patient's son had concerns about patient going to [facility] because of cognitive functioning; but was open to it pending progression and MRI results.  
 
There first of [facility] is [facility], should they decide to opt after MRI results.  
 
PABLITO spoke with Lee Ann at [facility] [phone] (DON), she stated that they would prefer patient go to [facility] prior returning to [facility]. CM informed patient's son Quintin of [facility]'s position on return.  
 
CM faxed clinicals to [facility] at [phone].  
 
CM to continue to follow patient progress and assist with disposition plan.  
 
 
Corin Telles) DANITZA Beck, MSW Supervisee 
 
Shari Bunch, MSW

## 2021-02-24 NOTE — PROGRESS NOTES
Problem: Self Care Deficits Care Plan (Adult) Goal: *Acute Goals and Plan of Care (Insert Text) Description: FUNCTIONAL STATUS PRIOR TO ADMISSION: Patient was independent and active without use of DME. Wife passed away 10 days ago. Patient not sure which instrumental ADLs he will still continue versus his family completing. HOME SUPPORT: The patient lived alone with family to provide assistance s/p wife passing. Occupational Therapy Goals Initiated 2/22/2021 1. Patient will perform standing 2 mins during upper body ADLs with RW with moderate assistance  within 7 day(s). 2.  Patient will perform lower body dressing with minimal assistance/contact guard assist within 7 day(s). 3.  Patient will perform bathing with minimal assistance/contact guard assist within 7 day(s). 4.  Patient will perform toilet transfers with minimal assistance/contact guard assist within 7 day(s). 5.  Patient will perform all aspects of toileting with minimal assistance/contact guard assist within 7 day(s). 6.  Patient will participate in upper extremity therapeutic exercise/activities with light resistance with supervision/set-up within 7 day(s). 7.  Patient will improve their Fugl Rosenthal score by 5 points in prep for ADLs within 7 days. Outcome: Progressing Towards Goal 
  
OCCUPATIONAL THERAPY TREATMENT Patient: Lacey Cisneros [de-identified][de-identified] y.o. male) Date: 2/24/2021 Diagnosis: [facility] [I63.9] <principal problem not specified> Precautions: Fall Chart, occupational therapy assessment, plan of care, and goals were reviewed. ASSESSMENT Patient continues with skilled OT services and is slowly progressing towards goals. Continues to be limited by generalized weakness, decreased arousal, balance, coordination, functional endurance, FM coordination, and  ?L inattention, ?visual impairment, requiring up to min-mod A x2 for mobility and mod A for grooming task at EOB this session. Session limited by persistent fatigue w/ patient snoring throughout, demonstrating difficulty keeping eyes open, requiring max cueing to attend to session w/ limited carryover. (RN reporting patient receiving multiple sedative medications) Patient struggling to locate items placed to L side at EOB and standing - better on R side but limited visual attention throughout. Bilateral soft wrist restraints placed at end of session. Recommend d/c to IPR as patient is far from baseline. Current Level of Function Impacting Discharge (ADLs): Up to min-mod A x2 for mobility and up to max A for self-care Other factors to consider for discharge: Decreased arousal, ?vision impairment, CVA workup still pending PLAN : 
Patient continues to benefit from skilled intervention to address the above impairments. Continue treatment per established plan of care. to address goals. Recommend with staff: Recommend with nursing, ADLs with min-max A, bed in modified chair 3x/day and toileting via bedpan x1  assist. Thank you for completing as able in order to maintain patient strength, endurance and independence. Recommendation for discharge: (in order for the patient to meet his/her long term goals) Therapy 3 hours per day 5-7 days per week This discharge recommendation: 
Has been made in collaboration with the attending provider and/or case management IF patient discharges home will need the following DME: TBD SUBJECTIVE:  
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  OBJECTIVE DATA SUMMARY:  
Cognitive/Behavioral Status: Neurologic State: Confused;Drowsy Orientation Level: Oriented to place; Disoriented to time;Disoriented to situation Cognition: Decreased attention/concentration;Poor safety awareness Perception: Cues to attend left visual field;Cues to attend right visual field Perseveration: No perseveration noted Safety/Judgement: Decreased awareness of environment;Decreased awareness of need for assistance;Decreased awareness of need for safety;Decreased insight into deficits Functional Mobility and Transfers for ADLs: 
Bed Mobility: 
Supine to Sit: Minimum assistance Sit to Supine: Minimum assistance Scooting: Stand-by assistance; Additional time Transfers: 
Sit to Stand: Minimum assistance;Assist x2(posterior trunk leaning) Balance: 
Sitting: Impaired Sitting - Static: Good (unsupported) Sitting - Dynamic: Fair (occasional) Standing: Impaired; With support Standing - Static: Constant support;Poor Standing - Dynamic : Constant support;Poor ADL Intervention: 
  
 
Grooming Brushing/Combing Hair: Moderate assistance Cues: Physical assistance;Verbal cues provided Patient eyes noted to be slightly open in standing but patient unable to locate hand placed to L side despite max multi-modal cues. Offered comb to L side w/ patient unable to locate despite specific cues for attention to L - w/ placement to R side, patient able to locate w/ gross sweeping UE movements to find item. Cognitive Retraining Orientation Retraining: Time;Awareness of environment;Situation Safety/Judgement: Decreased awareness of environment;Decreased awareness of need for assistance;Decreased awareness of need for safety;Decreased insight into deficits Pain: 
None noted Activity Tolerance:  
Poor After treatment patient left in no apparent distress:  
Supine in bed, Call bell within reach, Bed / chair alarm activated, Side rails x 3 and Restraints COMMUNICATION/COLLABORATION:  
 The patients plan of care was discussed with: Physical therapist and Registered nurse. Dameon Carson Time Calculation: 17 mins Regarding student involvement in patient care: A student participated in this treatment session. Per CMS Medicare statements and AOTA guidelines I certify that the following was true: 1. I was present and directly observed the entire session. 2. I made all skilled judgments and clinical decisions regarding care. 3. I am the practitioner responsible for assessment, treatment, and documentation.

## 2021-02-24 NOTE — PROGRESS NOTES
Problem: Dysphagia (Adult) Goal: *Acute Goals and Plan of Care (Insert Text) Description: Speech Therapy Goals Initiated 2/22/2021 1. Patient will participate in swallow re-evaluation within 7 days. Outcome: Not Progressing Towards Goal 
  
SPEECH LANGUAGE PATHOLOGY DYSPHAGIA TREATMENT Patient: Daly Quevedo [de-identified][de-identified] y.o. male) Date: 2/24/2021 Diagnosis: [facility] [I63.9] <principal problem not specified> Precautions:  Fall ASSESSMENT: 
Pt without any swallow initiation on this date despite effort appreciated on palpation (noted laryngeal bobbing without full initiation). Presentation concerning for brainstem involvement. Recommend ice chips intermittently to assist in swallow rehab and SLP will continue to await MRI to determine etiology of dysphagia and thus determine an appropriate prognosis. Pt does have a remote history of a pontine stroke, however, suspect that if this had caused this significant of a dysphagia then he would have needed a feeding tube prior to this admission. PLAN: 
Recommendations and Planned Interventions: 
--NPO with alternate means of nutrition 
--ice chips (2-3 every 1-2 hours) for swallow rehab Patient continues to benefit from skilled intervention to address the above impairments. Continue treatment per established plan of care. Discharge Recommendations: To Be Determined SUBJECTIVE:  
Patient stated, \"I don't know how long it's been. OBJECTIVE:  
Cognitive and Communication Status: 
Neurologic State: Confused Orientation Level: Oriented to place, Disoriented to time, Disoriented to situation Cognition: Decreased attention/concentration, Poor safety awareness Perception: Cues to attend left visual field, Cues to attend right visual field Perseveration: No perseveration noted Safety/Judgement: Decreased awareness of environment, Decreased awareness of need for assistance, Decreased awareness of need for safety, Decreased insight into deficits Dysphagia Treatment: P.O. Trials: 
Patient Position: upright in bed Vocal quality prior to P.O.: Hoarse Consistency Presented: Ice chips How Presented: SLP-fed/presented;Spoon Bolus Acceptance: No impairment Bolus Formation/Control: Impaired Type of Impairment: Incomplete Propulsion: Absent Initiation of Swallow: Absent Oral Phase Severity: Other (comment) Pharyngeal Phase Severity : Severe Pain: 
Pain Scale 1: Numeric (0 - 10) Pain Intensity 1: 0 After treatment:  
Call bell within reach and Nursing notified COMMUNICATION/EDUCATION:  
 
The patient's plan of care including recommendations, planned interventions, and recommended diet changes were discussed with: Registered nurse. Ej Sabillon SLP Time Calculation: 15 mins

## 2021-02-24 NOTE — PROGRESS NOTES
2130 patient arrived to [facility] in bilateral soft wrist restraints. There is no active order in the chart for restraints. Pt left in restraints as to not pull out lines and Xander Gallardo NP paged for new order. Old restraint ordered  at [phone] . New order placed at 2247.  
 
2300Pt resting in bed, not actively pulling at lines, lethargic and apneic, but arousable to voice and light stimulation. Pt placed on 2 L NC to keep O2 sats greater than 92%. Pt given 1 mg ativan IV at 1612. Xander Gallardo paged. In report from [facility], was told that patient was agitated, restless, climbing over bed rails. Xander Gallardo NP paged by primary RN, Kishore Portillo.

## 2021-02-24 NOTE — PROGRESS NOTES
Bedside and Verbal shift change report given to Angelo Barcenas (oncoming nurse) by Harish Bruce (offgoing nurse). Report included the following information SBAR, Kardex, Intake/Output, MAR, Cardiac Rhythm Sinus Rhythm and Sinus Tachycardia, Quality Measures and Dual Neuro Assessment.

## 2021-02-24 NOTE — PROGRESS NOTES
[address] [facility] Adult  Hospitalist Group Hospitalist Progress Note Ramon Cardona MD 
Answering service: [phone] OR [phone] from in house phone Progress Note Patient: Mitch Wasserman MRN: [id number]  SSN: [id number] YOB: 1940  Age: [de-identified] y.o. Sex: male Admit Date: 2/21/2021 LOS: 3 days Subjective:  
 
I have obtained more history from patient's son over the phone. Per report from the sun, patient has dementia and lives at an assisted living facility. He was doing fine until he swallow a jesse on 02/20 and complained of feeling stuck in the throat. He was evaluated at [facility] and EGD done on 02/21 showing esophagitis but no other acute finding. He was discharged to home yesterday and son noticed left facial droop and brought him to ER here. Some agitation overnight. Objective:  
 
Vitals:  
 02/24/21 0622 02/24/21 0900 02/24/21 1000 02/24/21 1200 BP:  (!) 139/97 (!) 168/93 Pulse:  100 98 Resp:  21 19 Temp:   98.3 °F (36.8 °C) SpO2: 96% 96% 95% 93% Weight:      
Height:      
  
 
Intake and Output: 
Current Shift: No intake/output data recorded. Last three shifts: 02/22 1901 - 02/24 0700 In: 25 [P.O.:25] Out: 80 [Urine:80] Physical Exam:  
GENERAL: Sleeping this AM 
THROAT & NECK: normal and no erythema or exudates noted. LUNG: clear to auscultation bilaterally HEART: regular rate and rhythm, S1, S2 normal, no murmur, click, rub or gallop ABDOMEN: soft, non-tender. Bowel sounds normal. No masses,  no organomegaly EXTREMITIES:  extremities normal, atraumatic, no cyanosis or edema SKIN: no rash or abnormalities NEUROLOGIC: Sedated PSYCHIATRIC: non focal 
 
Lab/Data Review: All lab results for the last 24 hours reviewed. Recent Results (from the past 24 hour(s)) GLUCOSE, POC Collection Time: 02/23/21 11:49 PM  
Result Value Ref Range Glucose (POC) 91 65 - 100 mg/dL Performed by Archie Cervantes RN   
METABOLIC PANEL, BASIC Collection Time: 02/24/21  5:52 AM  
Result Value Ref Range Sodium 143 136 - 145 mmol/L Potassium 3.6 3.5 - 5.1 mmol/L Chloride 111 (H) 97 - 108 mmol/L  
 CO2 22 21 - 32 mmol/L Anion gap 10 5 - 15 mmol/L Glucose 87 65 - 100 mg/dL BUN 22 (H) 6 - 20 MG/DL Creatinine 0.85 0.70 - 1.30 MG/DL  
 BUN/Creatinine ratio 26 (H) 12 - 20 GFR est AA >60 >60 ml/min/1.73m2 GFR est non-AA >60 >60 ml/min/1.73m2 Calcium 8.8 8.5 - 10.1 MG/DL  
CBC WITH AUTOMATED DIFF Collection Time: 02/24/21  5:52 AM  
Result Value Ref Range WBC 13.0 (H) 4.1 - 11.1 K/uL  
 RBC 4.57 4.10 - 5.70 M/uL  
 HGB 14.3 12.1 - 17.0 g/dL HCT 44.5 36.6 - 50.3 % MCV 97.4 80.0 - 99.0 FL  
 MCH 31.3 26.0 - 34.0 PG  
 MCHC 32.1 30.0 - 36.5 g/dL  
 RDW 12.9 11.5 - 14.5 % PLATELET 655 624 - 123 K/uL MPV 9.6 8.9 - 12.9 FL  
 NRBC 0.0 0  WBC ABSOLUTE NRBC 0.00 0.00 - 0.01 K/uL NEUTROPHILS 85 (H) 32 - 75 % LYMPHOCYTES 7 (L) 12 - 49 % MONOCYTES 7 5 - 13 % EOSINOPHILS 1 0 - 7 % BASOPHILS 0 0 - 1 % IMMATURE GRANULOCYTES 0 0.0 - 0.5 % ABS. NEUTROPHILS 11.1 (H) 1.8 - 8.0 K/UL  
 ABS. LYMPHOCYTES 0.9 0.8 - 3.5 K/UL  
 ABS. MONOCYTES 0.9 0.0 - 1.0 K/UL  
 ABS. EOSINOPHILS 0.1 0.0 - 0.4 K/UL  
 ABS. BASOPHILS 0.1 0.0 - 0.1 K/UL  
 ABS. IMM. GRANS. 0.0 0.00 - 0.04 K/UL  
 DF AUTOMATED    
GLUCOSE, POC Collection Time: 02/24/21  6:20 AM  
Result Value Ref Range Glucose (POC) 84 65 - 100 mg/dL Performed by Romel Sampson GLUCOSE, POC Collection Time: 02/24/21 12:25 PM  
Result Value Ref Range Glucose (POC) 87 65 - 100 mg/dL Performed by Amrit Lundberg Imaging: 
 
  
 
Assessment and Plan:  
 
Facial droop 
-Initial CT perfusion and CTA of the head and neck unremarkable 
-Neurology evaluated the patient and recommended no TPA 
-MRI of the brain pending, need sedation for MRI -Echocardiogram showing EF of 50 to 55% 
-Neurology evaluating the patient 
-Hold ASA for now due to JUSTINEBernadine ZEE Dysphagia 
-EGD report 02/21 shows esophagitis otherwise unremarkable 
-Need to rule out CVA 
-Speech therapy evaluated the patient and patient to remain n.p.o. Hematamesis 
-Noted today while speech evaluating the patient 
-EGD report from 02/21 as above 
-Hold ASA and lovenox 
-GI evaluated the patient  
-No recurrence of hematemesis and H&H stable 
  
Hypertension 
-Hold oral medications for now due to dysphagia 
-Labetalol as needed for systolic blood pressure more than 165 
-Continue scheduled Lopressor 5 mg IV every 8 hour 
  
Leukocytosis 
-Of unclear etiology, CXR with reticular interstitial opacities, negative for COVID 
-UA unremarkable 
  
Hyponatremia 
-Patient is clinically dehydrated, likely hypovolemic hyponatremia 
-Continue IV fluid and monitor sodium level 
  
Hypokalemia 
-Also likely due to poor p.o. intake 
-Replete potassium 
  
Abnormal blood sugars 
-Hemoglobin A1c is 5.7 Disposition plan: Unstable for dc, MRI pending and patient is still dysphagic. Signed By: Romeo Mejia MD   
 February 24, 2021

## 2021-02-25 NOTE — PROGRESS NOTES
Problem: Mobility Impaired (Adult and Pediatric) Goal: *Acute Goals and Plan of Care (Insert Text) Description: FUNCTIONAL STATUS PRIOR TO ADMISSION: Patient was independent and active without use of DME. 
 
HOME SUPPORT PRIOR TO ADMISSION: Pt questionable historian. Pt initially stated lived with son, then stated he lived in [facility]. Physical Therapy Goals Initiated 2/22/2021 1. Patient will move from supine to sit and sit to supine , scoot up and down, and roll side to side in bed with modified independence within 7 day(s). 2.  Patient will transfer from bed to chair and chair to bed with modified independence using the least restrictive device within 7 day(s). 3.  Patient will perform sit to stand with modified independence within 7 day(s). 4.  Patient will ambulate with modified independence for 300 feet with the least restrictive device within 7 day(s). 5.  Patient will ascend/descend 3 stairs with one handrail(s) with modified independence within 7 day(s). Outcome: Progressing Towards Goal 
 PHYSICAL THERAPY TREATMENT Patient: Valente Mcnally [de-identified][de-identified] y.o. male) Date: 2/25/2021 Diagnosis: CVA (cerebral vascular accident) ([facility] [I63.9] <principal problem not specified> Precautions: Fall Chart, physical therapy assessment, plan of care and goals were reviewed. ASSESSMENT Patient continues with skilled PT services and is slowly progressing towards goals. Pt received seated EOB attempting to get out of bed with bilateral soft wrist restraints in place. Pt remains confused and continues to ask for a food and speak with someone in the kitchen. Pt educated on NPO status, but unable to comprehend. Pt with fair seated balance EOB. Pt was able to return self back to supine position, but needed assist to scoot up towards [facility]. Pt remains well below baseline mobility status and will benefit from inpatient rehab upon discharge pending progress with acute therapies. Current Level of Function Impacting Discharge (mobility/balance): supervision bed mobility, confusion limiting performance this date Other factors to consider for discharge: previously lived alone in [address] PLAN : 
Patient continues to benefit from skilled intervention to address the above impairments. Continue treatment per established plan of care. to address goals. Recommendation for discharge: (in order for the patient to meet his/her long term goals) Therapy 3 hours per day 5-7 days per week This discharge recommendation: 
Has been made in collaboration with the attending provider and/or case management IF patient discharges home will need the following DME: to be determined (TBD) SUBJECTIVE:  
Patient stated I need speak with someone in the kitchen! [place] [facility] OBJECTIVE DATA SUMMARY:  
Critical Behavior: 
Neurologic State: Alert(eyes do not open, but patient verbal) Orientation Level: Oriented to person, Oriented to place Cognition: Decreased command following Safety/Judgement: Decreased awareness of environment, Decreased awareness of need for assistance, Decreased awareness of need for safety, Decreased insight into deficits Functional Mobility Training: 
Bed Mobility: 
  
Supine to Sit: Supervision Sit to Supine: Supervision Balance: 
Sitting: Impaired Sitting - Static: Good (unsupported) Sitting - Dynamic: Fair (occasional) Therapeutic Exercises:  
 
Pain Rating: 
Pt denied pain Activity Tolerance:  
Fair After treatment patient left in no apparent distress:  
Supine in bed, Call bell within reach, Side rails x 3, and Restraints COMMUNICATION/COLLABORATION:  
The patients plan of care was discussed with: Occupational therapist and Registered nurse. Cathrine Dandy, PT, DPT Time Calculation: 17 mins

## 2021-02-25 NOTE — PROGRESS NOTES
Occupational Therapy 02/25/21 Chart reviewed, patient cleared to be seen by RN. Attempted OT session. Pt going off the floor for MRI. Will defer and continue to follow. Thank you Amanda Sotelo

## 2021-02-25 NOTE — PROGRESS NOTES
Problem: Dysphagia (Adult) Goal: *Acute Goals and Plan of Care (Insert Text) Description: Speech Therapy Goals Initiated 2/22/2021 1. Patient will participate in swallow re-evaluation within 7 days. Outcome: Progressing Towards Goal 
 SPEECH LANGUAGE PATHOLOGY DYSPHAGIA TREATMENT Patient: Yari Godinez [de-identified][de-identified] y.o. male) Date: 2/25/2021 Diagnosis: [facility]bral vascular [facility] [I63.9] <principal problem not specified> Precautions:   Fall ASSESSMENT: 
Patient continues with severe oropharyngeal dysphagia. He demonstrates very minimal oral manipulation with suspected passive posterior loss. There is some laryngeal bobbing, but no swallow triggered, followed by coughing. Given MRI results from today, \"Atrophy and chronic white matter disease with small acute infarction left pontomedullary junction,\" suspect swallow recovery will be prolonged. PLAN: 
Recommendations and Planned Interventions: 
NPO, oral care Consider alternative nutrition. Patient continues to benefit from skilled intervention to address the above impairments. Continue treatment per established plan of care. Discharge Recommendations: To Be Determined SUBJECTIVE:  
Patient stated I'm just ducky. OBJECTIVE:  
Cognitive and Communication Status: 
Neurologic State: Alert(eyes do not open, but patient verbal) Orientation Level: Oriented to person, Oriented to place Cognition: Decreased command following Perception: Cues to attend left visual field, Cues to attend right visual field Perseveration: No perseveration noted Safety/Judgement: Decreased awareness of environment, Decreased awareness of need for assistance, Decreased awareness of need for safety, Decreased insight into deficits Dysphagia Treatment: 
Oral Assessment: 
Oral Assessment Dentition: Natural;Limited Oral Hygiene: very dry, oral care completed prior to PO trials P.O. Trials: 
Patient Position: upright in bed Vocal quality prior to P.O.: No impairment Consistency Presented: Ice chips How Presented: SLP-fed/presented;Spoon Bolus Acceptance: Impaired(passive acceptance) Bolus Formation/Control: Impaired Type of Impairment: Delayed; Incomplete;Poor(no lip closure, no mastication) Propulsion: Delayed (# of seconds) Oral Residue: None Initiation of Swallow: Absent Aspiration Signs/Symptoms: Strong cough After treatment:  
Patient left in no apparent distress in bed, Call bell within reach, and Nursing notified COMMUNICATION/EDUCATION:  
Patient was educated regarding purpose of SLP visit. He agreed to participate. The patient's plan of care including recommendations, planned interventions, and recommended diet changes were discussed with: Registered nurse. Cameron Taylor SLP Time Calculation: 10 mins

## 2021-02-25 NOTE — PROGRESS NOTES
Bedside and Verbal shift change report given to Eun (oncoming nurse) by Yoel Paul (offgoing nurse). Report included the following information SBAR, Kardex, ED Summary, Procedure Summary, Intake/Output, MAR, Recent Results, Cardiac Rhythm NSR, Quality Measures and Dual Neuro Assessment.

## 2021-02-25 NOTE — PROGRESS NOTES
Neurology Progress Arnulfo Mcguire NP Date of admission: 2/21/2021 Patient: Lisabeth Osgood MRN: [id number]  SSN: [id number] YOB: 1940  Age: [de-identified] y.o. Sex: male Subjective: HPI: Lisabeth Osgood is a [de-identified] y.o. male RH dominant male admitted to [facility] on 2/21/21 after he previously was at [facility] for dysphagia having undergone EGD with concern for possible esophagitis. Patient presented to [facility] after his son appreciated left facial weakness as well, unclear when patient was last seen by his son. On presentation was felt to have evidence for a central 7th nerve palsy with concern for stroke. On exam patient admits that he is having trouble swallowing but is inconsistent in his timeline saying it started yesterday at other times saying a few weeks. At initial assessment he did not have any weakness, numbness, speech or language impairment but does think he had a stroke due to his dysphagia. Denies ever having had a stroke before. Interval 02/25/21:  
 
Pt delirious. Oriented to self only. Significant L facial droop including L ptosis and dysarthria and impaired ability to lower and lift left eyebrow. MRI with small acute infarction left pontomedullary junction. Chronic right pontine infarction. Chronic right periatrial white matter infarction. Small chronic bilateral corona radiata/centrum semiovale infarctions Review of systems Review of systems negative except as detailed in the HPI, interval, PMH and A&P Past Medical History:  
Diagnosis Date  Arthritis  Hearing decreased   
 getting worse per spouse  High blood pressure  Melanoma in situ of left shoulder ([address] 1975  Prostate atrophy   
 takes finesteride Past Surgical History:  
Procedure Laterality Date  HX ROTATOR CUFF REPAIR Right 2011  UPPER GI ENDOSCOPY,BIOPSY  2/21/2021 No family history on file. Social History Tobacco Use  Smoking status: Never Smoker  Smokeless tobacco: Never Used Substance Use Topics  Alcohol use: Not on file Comment: seldom Prior to Admission medications Medication Sig Start Date End Date Taking? Authorizing Provider  
clotrimazole (LOTRIMIN) 1 % topical cream Apply  to affected area two (2) times a day. 4/2/20   Alison Hi MD  
ondansetron (ZOFRAN ODT) 4 mg disintegrating tablet Take 1 Tab by mouth every eight (8) hours as needed for Nausea or Vomiting. 4/2/20   Alison Hi MD  
miscellaneous medical supply misc Dtap for Pertusis, [place] and diptheria 7/18/19   Semaj Newell MD  
sertraline (ZOLOFT) 50 mg tablet Take 1 Tab by mouth daily. For anxiety 7/18/19   Semaj Newell MD  
atorvastatin (LIPITOR) 10 mg tablet Take 1 Tab by mouth daily. 7/18/19   Semaj Newell MD  
metoprolol succinate (TOPROL-XL) 50 mg XL tablet Take 1 Tab by mouth daily. 7/18/19   Semaj Newell MD  
omeprazole (PRILOSEC) 20 mg capsule Take 1 Cap by mouth daily. 7/18/19   Semaj Newell MD  
lisinopril-hydroCHLOROthiazide (PRINZIDE, ZESTORETIC) 20-25 mg per tablet Take 1 Tab by mouth daily. 5/7/19   Semaj Newell MD  
finasteride (PROSCAR) 5 mg tablet Take 1 Tab by mouth daily. 5/7/19   Semaj Newell MD  
tadalafil (CIALIS) 5 mg tablet One tablet once a day as needed for erectile dysfunction 6/19/18   Semaj Newell MD  
 
Current Facility-Administered Medications Medication Dose Route Frequency Provider Last Rate Last Admin  sucralfate (CARAFATE) tablet 1 g  1 g Oral AC&HS Glory Sera, NP   Stopped at 02/23/21 1630  
 metoprolol (LOPRESSOR) injection 5 mg  5 mg IntraVENous Q8H Umair Beckford MD   5 mg at 02/25/21 0601  OLANZapine (ZyPREXA zydis) disintegrating tablet 5 mg  5 mg Oral QHS Talat Nascimento MD   Stopped at 02/23/21 2258  LORazepam (ATIVAN) injection 1 mg  1 mg IntraVENous Q4H PRN Talat Nascimento MD   1 mg at 02/23/21 1612  atorvastatin (LIPITOR) tablet 40 mg  40 mg Oral QHS My Mao MD   Stopped at 21 2200  
 labetaloL (NORMODYNE;TRANDATE) injection 5 mg  5 mg IntraVENous Q6H PRN Dee Hogue MD   5 mg at 21 1738  pantoprazole (PROTONIX) 40 mg in 0.9% sodium chloride 10 mL injection  40 mg IntraVENous Q12H Vanessa Jose [place]   40 mg at 21 2137  [Held by provider] metoprolol succinate (TOPROL-XL) XL tablet 50 mg  50 mg Oral DAILY Niraj Beckford MD   Stopped at 21 0900  sertraline (ZOLOFT) tablet 50 mg  50 mg Oral DAILY Dee Hogue MD   Stopped at 21 0900  [Held by provider] aspirin chewable tablet 81 mg  81 mg Oral DAILY Madi Beckford MD   Stopped at 21 0900  
 lactated Ringers infusion  75 mL/hr IntraVENous CONTINUOUS Madi Beckford MD 75 mL/hr at 21 75 mL/hr at 21  sodium chloride (NS) flush 5-40 mL  5-40 mL IntraVENous Q8H Umair Beckford MD   10 mL at 21 4040  sodium chloride (NS) flush 5-40 mL  5-40 mL IntraVENous PRN Dee Hogue MD      
 acetaminophen (TYLENOL) tablet 650 mg  650 mg Oral Q6H PRN Umair Beckford MD      
 Or  
 acetaminophen (TYLENOL) suppository 650 mg  650 mg Rectal Q6H PRN Umair Beckford MD      
 polyethylene glycol (MIRALAX) packet 17 g  17 g Oral DAILY PRN Dee Hogue MD      
 promethazine (PHENERGAN) tablet 12.5 mg  12.5 mg Oral Q6H PRN Umair Beckford MD      
 Or  
 ondansetron (ZOFRAN) injection 4 mg  4 mg IntraVENous Q6H PRN Dee Hogue MD      
 [Held by provider] enoxaparin (LOVENOX) injection 40 mg  40 mg SubCUTAneous DAILY Niraj Beckford MD   Stopped at 21 0900 No Known Allergies Objective:  
 
Vitals:  
 21 2200 21 0159 21 9251 21 9518 BP: (!) 167/95 (!) 164/84 (!) 175/122 (!) 177/104 Pulse: (!) 105 99 (!) 106 98 Resp:  Temp: 98.4 °F (36.9 °C) 98.4 °F (36.9 °C) 98 °F (36.7 °C) SpO2: 93% 91% 93% Temp (24hrs), Av.2 °F (36.8 °C), Min:97.9 °F (36.6 °C), Max:98.4 °F (36.9 °C) O2 Device: Room air O2 Flow Rate (L/min): 2 l/min Intake/Output Summary (Last 24 hours) at 2/25/2021 9899 Last data filed at 2/25/2021 7196 Gross per 24 hour Intake 572 ml Output 600 ml Net -28 ml General: In NAD. Isaak Lozano Cardiac: RRR Lungs: Unlabored breathing. Abdomen: Soft/NT/non-distended. Extremities. No edema. Neurologic Exam: 
Mental Status:  Easily aroused, oriented to self only, delirious Language:    Grossly intact fluency and comprehension. Mild dysarthria Cranial Nerves:   Pupils equal, round and reactive to light. Visual fields intact. Extraocular movements intact w/o nystagmus Facial sensation diminished on the left, no extinction Facial activation impaired entire L face and includes mild ptosis Hearing grossly intact. Motor:    Bulk and tone normal.  
   4/5 left elbow extension, otherwise 5/5 strength in all extremities. No pronator drift. Mild high frequency resting tremor, L > R Sensation:    Sensation intact throughout to light touch. Coordination & Gait: No ataxia with finger to nose. Gait deferred LABS: 
Recent Labs  
  02/24/21 
0552 02/22/21 
1051 WBC 13.0* 15.7* HGB 14.3 13.4 HCT 44.5 40.3  237 Recent Labs  
  02/25/21 
0258 02/24/21 
0552 02/22/21 
1051 * 143 143  
K 3.6 3.6 3.2*  
* 111* 109* CO2 23 22 28 BUN 29* 22* 19  
CREA 0.97 0.85 1.02  
GLU 98 87 98  
CA 9.1 8.8 8.8 No results for input(s): ALT, AP, TBIL, TBILI, TP, ALB, GLOB, GGT, AML, LPSE in the last 72 hours. No lab exists for component: SGOT, GPT, AMYP, HLPSE Recent Labs  
  02/22/21 
1051 INR 1.1 PTP 11.5* APTT 29.2 No results for input(s): PHI, PCO2I, PO2I, HCO3I in the last 72 hours. No results for input(s): CPK, CKNDX, TROIQ in the last 72 hours. No lab exists for component: CPKMB Lab Results Component Value Date/Time  Cholesterol, total 173 02/22/2021 12:50 AM  
 HDL Cholesterol 50 02/22/2021 12:50 AM  
 LDL, calculated 106.6 (H) 02/22/2021 12:50 AM  
 Triglyceride 82 02/22/2021 12:50 AM  
 CHOL/HDL Ratio 3.5 02/22/2021 12:50 AM  
 
Lab Results  
Component Value Date/Time  
 Glucose (POC) 90 02/24/2021 07:55 PM  
 Glucose (POC) 87 02/24/2021 12:25 PM  
 Glucose (POC) 84 02/24/2021 06:20 AM  
 Glucose (POC) 91 02/23/2021 11:49 PM  
 
Lab Results  
Component Value Date/Time  
 Color YELLOW/STRAW 02/21/2021 08:03 PM  
 Appearance CLEAR 02/21/2021 08:03 PM  
 Specific gravity 1.005 02/21/2021 08:03 PM  
 pH (UA) 8.5 (H) 02/21/2021 08:03 PM  
 Protein 30 (A) 02/21/2021 08:03 PM  
 Glucose Negative 02/21/2021 08:03 PM  
 Ketone Negative 02/21/2021 08:03 PM  
 Bilirubin Negative 02/21/2021 08:03 PM  
 Urobilinogen 0.2 02/21/2021 08:03 PM  
 Nitrites Negative 02/21/2021 08:03 PM  
 Leukocyte Esterase Negative 02/21/2021 08:03 PM  
 Epithelial cells FEW 02/21/2021 08:03 PM  
 Bacteria Negative 02/21/2021 08:03 PM  
 WBC 0-4 02/21/2021 08:03 PM  
 RBC 5-10 02/21/2021 08:03 PM  
 
 
No results for input(s): FE, TIBC, PSAT, FERR in the last 72 hours.  
No results found for: FOL, RBCF  
No results for input(s): PH, PCO2, PO2 in the last 72 hours. 
No results for input(s): CPK, CKNDX, TROIQ in the last 72 hours. 
 
No lab exists for component: CPKMB 
 
Imaging: 
No results found. 
 
CT Results: 
Results from Hospital Encounter encounter on 02/21/21  
CTA CODE NEURO HEAD AND NECK W CONT  
 Narrative *PRELIMINARY REPORT* 
 
No large vessel occlusion. 
 
 Preliminary report was provided by Dr. Garcia, the on-call radiologist, at 1531 
hours 
 
Final report to follow. 
 
*END PRELIMINARY REPORT* 
 
 
 
CLINICAL HISTORY: Code stroke 
 
EXAMINATION:  CT ANGIOGRAPHY HEAD AND NECK, CT PERFUSION 
 
COMPARISON: None 
 
TECHNIQUE:  Following the uneventful administration of iodinated contrast 
material, axial CT angiography of the head and neck was performed. Delayed axial 
images through the head were also  obtained. Coronal and sagittal reconstructions 
were obtained. Manual postprocessing of images was performed. 3-D  Sagittal 
maximal intensity projection images were obtained. 3-D Coronal maximal 
intensity projections were obtained. CT brain perfusion was performed with 
generation of hemodynamic maps of multiple parameters, including cerebral blood 
flow, cerebral blood volume, mean transit time, and TMAX. CT dose reduction was 
achieved through use of a standardized protocol tailored for this examination 
and automatic exposure control for dose modulation. FINDINGS: 
 
DELAYED ENHANCEMENT HEAD CT Small remote lacunar infarct of the right selena. No intra or extra-axial mass or 
collection. Periventricular white matter disease compatible with chronic small 
vessel ischemic change. Ventricles are normal in size and configuration. The 
basal cisterns are patent. Dural venous sinuses are patent. No abnormal parenchymal or meningeal 
enhancement. Mastoid air cells and paranasal sinuses are clear. CTA NECK: 
 
Great vessels: Normal arch anatomy with the origins patent. Right subclavian artery: Patent Left subclavian artery: Patent Right common carotid artery: Patent Left common carotid artery: Patent Cervical right internal carotid artery: Patent with no significant stenosis by NASCET criteria. Cervical left internal carotid artery: Patent with no significant stenosis by NASCET criteria. Right vertebral artery: Patent Left vertebral artery: Patent The lung apices are clear. 1.7 cm left thyroid nodule. No cervical 
lymphadenopathy. Measurements utilize NASCET criteria. CTA HEAD: 
 
Right cavernous internal carotid artery: Mild atherosclerotic disease without 
hemodynamically significant stenosis. Left cavernous internal carotid artery: Mild atherosclerotic disease without 
hemodynamically significant stenosis. Anterior cerebral arteries: Patent Anterior communicating artery: Patent Right middle cerebral artery: Patent Left middle cerebral artery: Patent Posterior communicating arteries: Patent on the left, hypoplastic on the right. Posterior cerebral arteries: Patent. Bilateral P1 segments are patent. Short 
segment stenosis of the distal right P2 segment (473-64). Basilar artery: Patent Distal vertebral arteries: Patent No evidence for intracranial aneurysm. CT Perfusion: 
Normal CT perfusion. Impression 1. No acute vascular abnormality. No large vessel occlusion. Normal perfusion. 2. Short segment stenosis of the right P2 segment. 3. Sequela of chronic small vessel ischemic disease. Small remote lacunar 
infarct in the right selena. 4. 1.7 cm left thyroid nodule. CT CODE NEURO PERF W CBF Narrative *PRELIMINARY REPORT* No perfusion defect Preliminary report was provided by Dr. Michael Dsouza, the on-call radiologist, at 1525 
hours Final report to follow. *END PRELIMINARY REPORT* CLINICAL HISTORY: Code stroke EXAMINATION:  CT ANGIOGRAPHY HEAD AND NECK, CT PERFUSION 
 
COMPARISON: None TECHNIQUE:  Following the uneventful administration of iodinated contrast 
material, axial CT angiography of the head and neck was performed. Delayed axial 
images through the head were also obtained. Coronal and sagittal reconstructions 
were obtained. Manual postprocessing of images was performed. 3-D  Sagittal 
maximal intensity projection images were obtained. 3-D Coronal maximal 
intensity projections were obtained. CT brain perfusion was performed with 
generation of hemodynamic maps of multiple parameters, including cerebral blood 
flow, cerebral blood volume, mean transit time, and TMAX. CT dose reduction was 
achieved through use of a standardized protocol tailored for this examination 
and automatic exposure control for dose modulation. FINDINGS: 
 
DELAYED ENHANCEMENT HEAD CT Small remote lacunar infarct of the right selena. No intra or extra-axial mass or 
collection. Periventricular white matter disease compatible with chronic small 
vessel ischemic change. Ventricles are normal in size and configuration. The 
basal cisterns are patent. Dural venous sinuses are patent. No abnormal parenchymal or meningeal 
enhancement. Mastoid air cells and paranasal sinuses are clear. CTA NECK: 
 
Great vessels: Normal arch anatomy with the origins patent. Right subclavian artery: Patent Left subclavian artery: Patent Right common carotid artery: Patent Left common carotid artery: Patent Cervical right internal carotid artery: Patent with no significant stenosis by NASCET criteria. Cervical left internal carotid artery: Patent with no significant stenosis by NASCET criteria. Right vertebral artery: Patent Left vertebral artery: Patent The lung apices are clear. 1.7 cm left thyroid nodule. No cervical 
lymphadenopathy. Measurements utilize NASCET criteria. CTA HEAD: 
 
Right cavernous internal carotid artery: Mild atherosclerotic disease without 
hemodynamically significant stenosis. Left cavernous internal carotid artery: Mild atherosclerotic disease without 
hemodynamically significant stenosis. Anterior cerebral arteries: Patent Anterior communicating artery: Patent Right middle cerebral artery: Patent Left middle cerebral artery: Patent Posterior communicating arteries: Patent on the left, hypoplastic on the right. Posterior cerebral arteries: Patent. Bilateral P1 segments are patent. Short 
segment stenosis of the distal right P2 segment (520-42). Basilar artery: Patent Distal vertebral arteries: Patent No evidence for intracranial aneurysm. CT Perfusion: 
Normal CT perfusion. Impression 1. No acute vascular abnormality. No large vessel occlusion. Normal perfusion. 2. Short segment stenosis of the right P2 segment.  
3. Sequela of chronic small vessel ischemic disease. Small remote lacunar 
infarct in the right selena. 
4. 1.7 cm left thyroid nodule.  
CT CODE NEURO HEAD WO CONTRAST  
 Narrative EXAM: CT CODE NEURO HEAD WO CONTRAST 
 
INDICATION: code S level 2 
 
COMPARISON: None. 
 
CONTRAST: None. 
 
TECHNIQUE: Unenhanced CT of the head was performed using 5 mm images. Brain and 
bone windows were generated. Coronal and sagittal reformats. CT dose reduction 
was achieved through use of a standardized protocol tailored for this 
examination and automatic exposure control for dose modulation.   
 
FINDINGS: 
The ventricles and sulci are normal in size, shape and configuration.. There is 
mild periventricular white matter hypodensity.. There is no intracranial 
hemorrhage, extra-axial collection, or mass effect. The basilar cisterns are 
open. No CT evidence of acute infarct. 
 
The bone windows demonstrate no abnormalities. The visualized portions of the 
paranasal sinuses and mastoid air cells are clear. 
  
 Impression No acute intracranial process identified.  
 
 
MRI Results: 
Results from Hospital Encounter encounter on 08/03/19  
MRI SHOULDER RT WO CONT  
 Narrative MRI OF THE RIGHT SHOULDER WITHOUT CONTRAST. 
 
Clinical Indication: Right shoulder pain for two months after a fall 
 
Procedure: Multiplanar and multisequence MR images of the right shoulder were 
performed without gadolinium contrast. 
 
Comparison: No prior 
 
Findings:  
 
AC joint: Mild degenerative hypertrophy is appreciated the AC joint. Humeral 
head is high riding and abuts the undersurface of the lateral acromion. There is 
fluid signal present in the subacromial/subdeltoid bursa. 
 
Rotator cuff: A massive rotator cuff tendon tear is appreciated. Note, suture 
anchors are present in the greater tuberosity indicative of a prior rotator cuff 
tendon repair. The supraspinatus and infraspinatus tendons have retracted to the 
glenoid rim indicative of a full-thickness recurrent tear. A  full-thickness tear 
is also noted of the subscapularis tendon with only a few inferior fibers 
remaining intact. Fatty atrophy is noted of both the subscapularis and 
supraspinatus tendons. The teres minor tendon remains intact. Biceps labral complex: There is a complete tear with extracapsular retraction of 
the long head of biceps tendon. The superior labrum is intact. The joint capsule 
in the axillary recess is intact. Glenohumeral joint: Small marginal osteophytes are noted off the medial aspect 
of the humeral head-neck junction and off the glenoid rim. Degenerative labral 
tearing is evident. There is a trace joint effusion. No abnormal soft tissue mass noted. Impression IMPRESSION: 
1. Massive recurrent rotator cuff tendon tear with full thickness tearing noted 
of the supraspinatus, infraspinatus, and subscapularis tendons. There is mild 
fatty atrophy of the supraspinatus and subscapularis muscle bellies. 2. 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 
degenerative tearing evident. 3. Complete tear with extracapsular retraction of the long head of biceps 
tendon. 4. Mild degenerative hypertrophy of the [facility] joint. XR Results Results from [facility] Encounter encounter on 02/21/21 XR CHEST PORT Impression Mild bilateral reticular interstitial opacities may represent 
pulmonary edema. Bibasilar atelectasis. Results from Office Visit encounter on 07/18/19 XR SHOULDER RT AP/LAT MIN 2 V Impression Impression:  No evidence of acute injury. Moderate osteoarthritis with probable 
chronic rotator cuff tear as described above. Results from Abstract encounter on 06/22/18 XR SPINE LUMBAR BEND/FLEXION EXTENSION  
 
 
VAS/US Results (maximum last 3): No results found for this or any previous visit. TTE 
02/21/21 ECHO ADULT COMPLETE 02/22/2021 2/22/2021 Narrative · Saline contrast was given to evaluate for intracardiac shunt.  
· LV: Estimated LVEF is 50 - 55%. Normal cavity size and wall thickness. Low normal systolic function. Mild (grade 1) left ventricular diastolic  
dysfunction. · IAS: No atrial septal defect present. Agitated saline contrast study was  
performed. · AV: Aortic valve focal thickening present. · MV: Mitral valve non-specific thickening. · No patent foramen ovale visualized. Signed by: Eddie Day MD  
 
 
 
EKG Results for orders placed or performed during the hospital encounter of 02/21/21 EKG, 12 LEAD, INITIAL Result Value Ref Range Ventricular Rate 91 BPM  
 Atrial Rate 91 BPM  
 P-R Interval 190 ms QRS Duration 72 ms Q-T Interval 362 ms QTC Calculation (Bezet) 445 ms Calculated P Axis 55 degrees Calculated R Axis -19 degrees Calculated T Axis -13 degrees Diagnosis Normal sinus rhythm Voltage criteria for left ventricular hypertrophy Nonspecific ST and T wave abnormality No previous ECGs available Confirmed by Eric John M.D., Zana Carrie ([id number]) on 2/22/2021 12:17:03 PM 
  
 
 
Hospital Problems  Date Reviewed: 4/2/2020 Codes Class Noted POA  
 [facility] ICD-10-CM: I63.9 ICD-9-CM: 434.91  2/21/2021 Unknown Assessment/Plan: PTA antiplatelet: No 
PTA AC: No 
PTA statin: Atorva 10 LDL: 106 ASA assay: will check once initiated P2Y12: 
ECHO: LVEF is 50 - 55%. No PFO or intracardiac shunt. No other significant findings of concern. EKG: NSR 
A1C: 5.7 Mook Gerber is a [de-identified] y.o. male with a history of some degree of dementia who was recently at [facility] for swallowing difficulties, where he underwent EGD with no significant diagnosis who subsequently presented 2/21/21 to [facility] for dysphagia and left facial weakness. Patient started on increased dose of atorvastatin as well as aspirin. Echocardiogram is unrevealing as is vessel imaging. He is NPO due to dysphagia and had an episode of hematemesis. Aspirin and DVT proph enoxaparin held. MRI today, 2/25, shows acute R pontomedullary infarct. Likely atherothrombosis given small solitary location in a deep structure. MRI shows several remote deep stucture infarcts including the R selena. Exam c/w newly found acute infarct. Slight LUE weakness likely sequelae of remote R pontine infarct. Stroke  NH daily when possible given recent hematemesis Already on increased atorvastatin A1C within goal. 
BP goal now and long-term <140/<80 Stroke education PT/OT/SLP For delirium recommend - Encourage early mobilization (e.g. ambulation, at least OOB to chair bid) - Physical therapy and occupational therapy - Optimize sleep hygiene - Minimize noise and cluster activities around neuro checks at night - let pt sleep - Avoid television at night - Keep lights on and window shades up during the day - Keep lights off and window shades closed at night - Minimize naps during the day - Avoid over stimulating, exhausting the patient during the day with noise, too many visitors, visiting too long - Frequently re-orient the patient to date, time, and caretakers Avoid deliriogenic drugs - Sedative hypnotics - e.g., benzodiazepines - Opioids - over use AND under use - persistent, unmanaged pain can cause delirium - Steroids - dexamethasone, prednisone, methylprednisolone  
- Skeletal muscle relaxants - e.g., baclofen, methocarbamol, tizanidine - Anticholinergics - e.g., Glycopyrrolate, Hyoscyamine, Scopolamine - Antihistamines - e.g., diphenhydramine, chlorpheniramine Will likely improve once out of the hospital when he can re-establish a routine with familiar people. However, people with dementia frequently do not return fully to their pre-decompensation baseline. Thank you for this consult. Signed By: Carole Gonzalez NP  February 25, 2021 8:10 AM

## 2021-02-25 NOTE — PROGRESS NOTES
[facility] Hospitalist Progress Note Claudetta Grange, MD 
Answering service: [phone] OR [phone] from in house phone Date of Service:  2021 NAME:  Alexy Joya :  1940 MRN:  [id number] Admission Summary:  
Alexy Joya is a [de-identified] y.o. male who presents for evaluation of facial droop. Patient is somewhat of a poor historian. He states that he has been having trouble swallowing for over a month. Unclear what kind of diet he has been able to eat and what he can. Cannot specify that and patient just saying \" I can swallow\". Patient also reports some problem with his balance since his swallowing problem started. Interval history / Subjective: Pt without specific complaints. He thinks his speech is slightly better. MRI revealed acute CVA Speech continues to recommend NPO Assessment & Plan:  
 
Acute CVA 
- MRi revealed L medulopontine CVA 
- CT/CTA negative - Echo with EF 50-55% - Neurology following - Resume ASA once able to take PO Dysphagia - secondary to CVA 
- Place NG and start trickle feeds.  
- Long discussion with family today regarding PEG. Daughter and son will discuss today  
- KUB post NG placement - Speech following Possible  hematemesis - with esaphagitis on EGD  
- hold lovenox - Resume ASA - Monitor hemoglobin closely  
- Continue PPI, switch to PO if able HTN - holding oral medications for now due to dysphagia, but may resume if NG placed, Monitor, PRN IV medications Hyponatremia - hypovolemic, now becoming hypernatremic. DC IVF and monitor Code status: DNR 
DVT prophylaxis: SCDs due to [facility] Care Plan discussed with: Patient/Family Anticipated Disposition: SNF/LTC,  PT, OT, RN and SAH/Rehab Anticipated Discharge: Greater than 48 hours Hospital Problems  Date Reviewed: 2020 Codes Class Noted POA  
 CVA (cerebral vascular accident) ([facility] [address] ICD-10-CM: I63.9 ICD-9-CM: 434.91  2/21/2021 Unknown Review of Systems: A comprehensive review of systems was negative except for that written in the HPI. Vital Signs:  
 Last 24hrs VS reviewed since prior progress note. Most recent are: 
Visit Vitals /89 (BP 1 Location: Left arm, BP Patient Position: At rest) Pulse 91 Temp 97.4 °F (36.3 °C) Resp 20 Ht 5' 9\" (1.753 m) Wt 61.4 kg (135 lb 5.8 oz) SpO2 92% BMI 19.99 kg/m² Intake/Output Summary (Last 24 hours) at 2/25/2021 1624 Last data filed at 2/25/2021 8907 Gross per 24 hour Intake 572 ml Output 600 ml Net -28 ml Physical Examination:  
 
I had a face to face encounter with this patient and independently examined them on 2/25/2021 as outlined below: 
 
     
Constitutional:  No acute distress, cooperative, pleasant ENT:  Oral mucosa moist, oropharynx benign. R facial droop Resp:  CTA bilaterally. No wheezing/rhonchi/rales. No accessory muscle use CV:  Regular rhythm, normal rate, no murmurs, gallops, rubs GI:  Soft, non distended, non tender. normoactive bowel sounds, no hepatosplenomegaly Musculoskeletal:  No edema, warm, 2+ pulses throughout Neurologic:  Moves all extremities. AAOx2 (self, and hospital) CN II-XII reviewed Skin:  Good turgor, no rashes or ulcers Data Review:  
 Review and/or order of clinical lab test 
Review and/or order of tests in the radiology section of CPT Review and/or order of tests in the medicine section of CPT Labs:  
 
Recent Labs  
  02/24/21 
9629 WBC 13.0* HGB 14.3 HCT 44.5  Recent Labs  
  02/25/21 
0258 02/24/21 
6265 * 143  
K 3.6 3.6 * 111* CO2 23 22 BUN 29* 22* CREA 0.97 0.85 GLU 98 87  
CA 9.1 8.8 No results for input(s): ALT, AP, TBIL, TBILI, TP, ALB, GLOB, GGT, AML, LPSE in the last 72 hours.  
 
No lab exists for component: SGOT, GPT, AMYP, HLPSE No results for input(s): INR, PTP, APTT, INREXT in the last 72 hours. No results for input(s): FE, TIBC, PSAT, FERR in the last 72 hours. No results found for: FOL, RBCF No results for input(s): PH, PCO2, PO2 in the last 72 hours. No results for input(s): CPK, CKNDX, TROIQ in the last 72 hours. No lab exists for component: CPKMB Lab Results Component Value Date/Time Cholesterol, total 173 02/22/2021 12:50 AM  
 HDL Cholesterol 50 02/22/2021 12:50 AM  
 LDL, calculated 106.6 (H) 02/22/2021 12:50 AM  
 Triglyceride 82 02/22/2021 12:50 AM  
 CHOL/HDL Ratio 3.5 02/22/2021 12:50 AM  
 
Lab Results Component Value Date/Time Glucose (POC) 96 02/25/2021 12:08 PM  
 Glucose (POC) 90 02/24/2021 07:55 PM  
 Glucose (POC) 87 02/24/2021 12:25 PM  
 Glucose (POC) 84 02/24/2021 06:20 AM  
 Glucose (POC) 91 02/23/2021 11:49 PM  
 
Lab Results Component Value Date/Time Color YELLOW/STRAW 02/21/2021 08:03 PM  
 Appearance CLEAR 02/21/2021 08:03 PM  
 Specific gravity 1.005 02/21/2021 08:03 PM  
 pH (UA) 8.5 (H) 02/21/2021 08:03 PM  
 Protein 30 (A) 02/21/2021 08:03 PM  
 Glucose Negative 02/21/2021 08:03 PM  
 Ketone Negative 02/21/2021 08:03 PM  
 Bilirubin Negative 02/21/2021 08:03 PM  
 Urobilinogen 0.2 02/21/2021 08:03 PM  
 Nitrites Negative 02/21/2021 08:03 PM  
 Leukocyte Esterase Negative 02/21/2021 08:03 PM  
 Epithelial cells FEW 02/21/2021 08:03 PM  
 Bacteria Negative 02/21/2021 08:03 PM  
 WBC 0-4 02/21/2021 08:03 PM  
 RBC 5-10 02/21/2021 08:03 PM  
 
 
 
Medications Reviewed:  
 
Current Facility-Administered Medications Medication Dose Route Frequency  sucralfate (CARAFATE) tablet 1 g  1 g Oral AC&HS  
 metoprolol (LOPRESSOR) injection 5 mg  5 mg IntraVENous Q8H  
 OLANZapine (ZyPREXA zydis) disintegrating tablet 5 mg  5 mg Oral QHS  LORazepam (ATIVAN) injection 1 mg  1 mg IntraVENous Q4H PRN  
 atorvastatin (LIPITOR) tablet 40 mg  40 mg Oral QHS  
 labetaloL (NORMODYNE;TRANDATE) injection 5 mg  5 mg IntraVENous Q6H PRN  pantoprazole (PROTONIX) 40 mg in 0.9% sodium chloride 10 mL injection  40 mg IntraVENous Q12H  
 [Held by provider] metoprolol succinate (TOPROL-XL) XL tablet 50 mg  50 mg Oral DAILY  sertraline (ZOLOFT) tablet 50 mg  50 mg Oral DAILY  [Held by provider] aspirin chewable tablet 81 mg  81 mg Oral DAILY  lactated Ringers infusion  75 mL/hr IntraVENous CONTINUOUS  
 sodium chloride (NS) flush 5-40 mL  5-40 mL IntraVENous Q8H  
 sodium chloride (NS) flush 5-40 mL  5-40 mL IntraVENous PRN  
 acetaminophen (TYLENOL) tablet 650 mg  650 mg Oral Q6H PRN Or  
 acetaminophen (TYLENOL) suppository 650 mg  650 mg Rectal Q6H PRN  polyethylene glycol (MIRALAX) packet 17 g  17 g Oral DAILY PRN  promethazine (PHENERGAN) tablet 12.5 mg  12.5 mg Oral Q6H PRN Or  
 ondansetron (ZOFRAN) injection 4 mg  4 mg IntraVENous Q6H PRN  
 [Held by provider] enoxaparin (LOVENOX) injection 40 mg  40 mg SubCUTAneous DAILY  
 
______________________________________________________________________ EXPECTED LENGTH OF STAY: 2d 21h ACTUAL LENGTH OF STAY:          4 
 
            
Cyril Steinberg MD

## 2021-02-25 NOTE — PROGRESS NOTES
Physical therapy: Attempted PT session. Pt going off the floor for MRI. Will defer and continue to follow. Thank you Jannet Roque, PT, DPT

## 2021-02-26 NOTE — PROGRESS NOTES
Problem: Mobility Impaired (Adult and Pediatric) Goal: *Acute Goals and Plan of Care (Insert Text) Description: FUNCTIONAL STATUS PRIOR TO ADMISSION: Patient was independent and active without use of DME. 
 
HOME SUPPORT PRIOR TO ADMISSION: Pt questionable historian. Pt initially stated lived with son, then stated he lived in [facility]. Physical Therapy Goals Initiated 2/22/2021 1. Patient will move from supine to sit and sit to supine , scoot up and down, and roll side to side in bed with modified independence within 7 day(s). 2.  Patient will transfer from bed to chair and chair to bed with modified independence using the least restrictive device within 7 day(s). 3.  Patient will perform sit to stand with modified independence within 7 day(s). 4.  Patient will ambulate with modified independence for 300 feet with the least restrictive device within 7 day(s). 5.  Patient will ascend/descend 3 stairs with one handrail(s) with modified independence within 7 day(s). Outcome: Progressing Towards Goal 
 PHYSICAL THERAPY TREATMENT Patient: Valente Mcnally [de-identified][de-identified] y.o. male) Date: 2/26/2021 Diagnosis: [facility] [I63.9] <principal problem not specified> Precautions: Fall Chart, physical therapy assessment, plan of care and goals were reviewed. ASSESSMENT Patient continues with skilled PT services and is progressing towards goals. Pt received supine in bed and agreeable to therapy. Pt continues to be limited by confusion, decreased functional mobility, impaired balance and gait, decreased insight into deficits and safety awareness placing him at increased risk for falls. Pt completed transfers with min A x 2 and progressive to CGA-min A x 1 and HHA. Pt practiced gait training side stepping and eventually able to walk around the bed x 10 ft with min A x 2 and HHA x 2. Pt returned to supine position in bed with bilateral wrist restraints  in place. Pt will  benefit from inpatient rehab upon discharge to continue therapy efforts. .  
 
Current Level of Function Impacting Discharge (mobility/balance): min A x 2 sit<>stand and gait training x 10 ft Other factors to consider for discharge: previously lived alone at [facility] PLAN : 
Patient continues to benefit from skilled intervention to address the above impairments. Continue treatment per established plan of care. to address goals. Recommendation for discharge: (in order for the patient to meet his/her long term goals) Therapy 3 hours per day 5-7 days per week This discharge recommendation: 
Has been made in collaboration with the attending provider and/or case management IF patient discharges home will need the following DME: to be determined (TBD) SUBJECTIVE:  
Patient stated Paramjit Mantilla were questionable sales people in [place] I worked with.  OBJECTIVE DATA SUMMARY:  
Critical Behavior: 
Neurologic State: Confused, Alert Orientation Level: Oriented to person, Disoriented to place, Disoriented to situation, Disoriented to time Cognition: Decreased attention/concentration Safety/Judgement: Decreased awareness of environment, Decreased awareness of need for assistance, Decreased awareness of need for safety, Decreased insight into deficits Functional Mobility Training: 
Bed Mobility: Supine to Sit: Contact guard assistance Sit to Supine: Stand-by assistance Transfers: 
Sit to Stand: Minimum assistance Stand to Sit: Minimum assistance Balance: 
Sitting: Impaired Sitting - Static: Good (unsupported) Sitting - Dynamic: Fair (occasional) Standing: Impaired; With support Standing - Static: Constant support; Fair 
Standing - Dynamic : Constant support; Melyssa Landaverde Ambulation/Gait Training: 
Distance (ft): 10 Feet (ft) Assistive Device: Gait belt(bilateral HHA) Ambulation - Level of Assistance: Minimal assistance;Assist x2 Gait Abnormalities: Decreased step clearance;Trunk sway increased; Shuffling gait Base of Support: Narrowed Speed/Shanita: Pace decreased (<100 feet/min); Shuffled; Slow Step Length: Right shortened;Left shortened Therapeutic Exercises:  
Repetitive sits<>stands x 5 reps with CGA-Alber Pain Rating: 
Pt denied pain Activity Tolerance:  
Good After treatment patient left in no apparent distress:  
Supine in bed, Call bell within reach, Bed / chair alarm activated, Side rails x 3, and Restraints COMMUNICATION/COLLABORATION:  
The patients plan of care was discussed with: Occupational therapist and Registered nurse. Alethea Flanagan, PT, DPT Time Calculation: 23 mins

## 2021-02-26 NOTE — PROGRESS NOTES
Comprehensive Nutrition Assessment Type and Reason for Visit: Initial, NPO/clear liquid Nutrition Recommendations/Plan: 1. Start Tube Feeding if DHT placed/pt doesn't remove: 
 Jevity 1.5 @ 15 mL/hr. Increase 10 mL/hr Q8H if lytes stable to goal of 45 mL/hr. Flush 130 mL Q4H. 2. Monitor lytes for refeeding- NPO x 5 days. Nutrition Assessment:    
 
Pt admitted for CVA (cerebral vascular accident) ([facility] [address] [I63.9]. 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 Fidel Leggett Pt discussed in rounds- pt had MRI yesterday, SLP notes indicate pt unsafe for nutrition PO at this point in time. RN noted pt had 4 DHT tube placed but pt pulled them. Consider placing bridle and/or mitts to prevent pulling. Pt was in restraints but still pulled DHT. Start nutrition via [address] as able. Monitor swallow ability. Consider PPN if all other means of nutrition are unavailable. Pt wt hx is erratic. Appears to usually be around 130 lbs. Pt recent weight on this admission from 146 lbs to 126 lbs over 4 days is unlikely. Wt Readings from Last 10 Encounters:  
02/26/21 57.2 kg (126 lb 3.2 oz) 02/22/21 66.4 kg (146 lb 6.2 oz) 02/21/21 66.2 kg (146 lb) 08/06/19 58.2 kg (128 lb 3.2 oz) 08/03/19 74.8 kg (165 lb)  
07/18/19 57.9 kg (127 lb 9.6 oz) 05/07/19 58.6 kg (129 lb 3.2 oz) 02/22/19 60.2 kg (132 lb 12.8 oz) 02/07/19 60.3 kg (133 lb) 11/06/18 59.9 kg (132 lb) Malnutrition Assessment: 
Malnutrition Status: Moderate malnutrition Context:  Acute illness Findings of the 6 clinical characteristics of malnutrition:  
Energy Intake:  7 - 50% or less of est energy requirements for 5 or more days Weight Loss:  Unable to assess Body Fat Loss:  1 - Mild body fat loss, Muscle Mass Loss:  1 - Mild muscle mass loss, Fluid Accumulation:  No significant fluid accumulation,   
 Strength: Not performed Estimated Daily Nutrient Needs: 
Energy (kcal):  8688 Protein (g):  57-68 (1-1.2g/kg) Fluid (ml/day):  1525 Meal intake: No data found. Nutrition Related Findings:   
 Last BM PTA, no edema. Nutritionally Significant Medications:  
Lipitor, metoprolol, Protonix, carafate. No IVF in [facility] Wounds:   
None Current Nutrition Therapies: DIET NPO Current Tube Feeding (TF) Orders: · Feeding Route: Nasogastric · Formula: Jevity 1.5 · Schedule:Continuous · Regimen: 15 mL/hr, increase to 45 mL/hr · Additives/Modulars:   
· Water Flushes: 130 mL q4h · Current TF & Flush Orders Provides:   
· Goal TF & Flush Orders Provides: 1485 kcal, 63 g Pro, 1532 mL free water Anthropometric Measures: 
· Height:  5' 2.99\" (160 cm) · Current Body Wt:  57.2 kg (126 lb) · Admission Body Wt:    146 lbs - likely inaccurate · Usual Body Wt:    130 lbs · Ideal Body Wt:  124 lbs:  101.6 % · BMI Category:  Normal weight (BMI 22.0-24.9) age over 72 Nutrition Diagnosis:  
· Inadequate oral intake related to cognitive or neurological impairment, swallowing difficulty as evidenced by NPO or clear liquid status due to medical condition Nutrition Interventions:  
Food and/or Nutrient Delivery: Continue NPO, Start tube feeding Nutrition Education and Counseling: No recommendations at this time Coordination of Nutrition Care: Continue to monitor while inpatient, Interdisciplinary rounds, Swallow evaluation Goals: Pt will have NG tube successfully placed and start TF within 1-3 days or PO will initiate Nutrition Monitoring and Evaluation:  
Behavioral-Environmental Outcomes: None identified Food/Nutrient Intake Outcomes: Enteral nutrition intake/tolerance, Diet advancement/tolerance Physical Signs/Symptoms Outcomes: Biochemical data, Weight Discharge Planning: Too soon to determine Electronically signed by Sanford Bonner RD Contact: [phone]

## 2021-02-26 NOTE — PROGRESS NOTES
Problem: Dysphagia (Adult) Goal: *Acute Goals and Plan of Care (Insert Text) Description: Speech Therapy Goals Initiated 2/22/2021 1. Patient will participate in swallow re-evaluation within 7 days. Outcome: Progressing Towards Goal 
  
SPEECH LANGUAGE PATHOLOGY DYSPHAGIA TREATMENT Patient: Demario Lanier [de-identified][de-identified] y.o. male) Date: 2/26/2021 Diagnosis: [facility] [I63.[facility]] <principal problem not specified> Precautions: Fall ASSESSMENT: 
Pt with slight improvement on this date with intermittent swallow initiation, but mostly still with laryngeal bobbing. Given presence of acute pontomedullary infarct, am concerned for a lengthy swallow recovery. Typically, literature supports that patients with medullary infarcts fall into three categories: first group has improvement within days (patient is out of this window), second group is within 6 months, or the third group as no swallow recovery. To help to facilitate improvement in swallow function, continue to recommend small amounts of ice chips for swallow rehab to help aid pt in relearning swallow initiation. Pt may be a candidate for objective imaging if family requires this to help make decisions, however, pt not appropriate for this today. Will continue to follow. PLAN: 
Recommendations and Planned Interventions: 
--NPO with alternate means of nutrition/hydration/medication 
--Ice chips (2-3x/1-2 hours) for swallow rehab 
--Oral care Patient continues to benefit from skilled intervention to address the above impairments. Continue treatment per established plan of care. Discharge Recommendations: To Be Determined SUBJECTIVE:  
Patient stated (regarding SLP), \"There is this imposing man here. He's very tall and is a . He's going to give my dog shots. SLP is a 8'3'' female so unsure if he was hallucinating or just confused.  
 
OBJECTIVE:  
Cognitive and Communication Status: 
Neurologic State: Alert, Confused Orientation Level: Oriented to person, Disoriented to place, Disoriented to situation, Disoriented to time Cognition: Decreased attention/concentration, Decreased command following Perception: Cues to attend left visual field, Cues to attend right visual field Perseveration: No perseveration noted Safety/Judgement: Decreased awareness of environment, Decreased awareness of need for assistance, Decreased awareness of need for safety, Decreased insight into deficits Dysphagia Treatment: P.O. Trials: 
Patient Position: upright in bed Vocal quality prior to P.O.: Hoarse Consistency Presented: Ice chips How Presented: SLP-fed/presented;Spoon Bolus Acceptance: No impairment Bolus Formation/Control: Impaired Type of Impairment: Delayed Propulsion: Delayed (# of seconds) Oral Residue: None Initiation of Swallow: Delayed (# of seconds) Laryngeal Elevation: Absent;Decreased;Weak 
  
  
  
  
  
 
Oral Phase Severity: Moderate Pharyngeal Phase Severity : Severe Pain: 
Pain Scale 1: Numeric (0 - 10) Pain Intensity 1: 0 After treatment:  
Call bell within reach and Nursing notified COMMUNICATION/EDUCATION:  
 
The patient's plan of care including recommendations, planned interventions, and recommended diet changes were discussed with: Registered nurse. SPENCER Jimenez Time Calculation: 15 mins

## 2021-02-26 NOTE — PROGRESS NOTES
[address] [facility] Adult  Hospitalist Group Hospitalist Progress Note Emilio Woodall MD 
Answering service: [phone] OR [phone] from in house phone Date of Service:  2021 NAME:  Sara Florence :  1940 MRN:  [id number] Admission Summary:  
Sara Florence is a [de-identified] y.o. male who presents for evaluation of facial droop. Patient is somewhat of a poor historian. He states that he has been having trouble swallowing for over a month. Unclear what kind of diet he has been able to eat and what he can. Cannot specify that and patient just saying \" I can swallow\". Patient also reports some problem with his balance since his swallowing problem started. Interval history / Subjective:  
Pt remains confused. NG placed last night mutliple times without success. Plan for placement of bridled tube Patient is not answering questions appropriately, confused, but denies any pain. Assessment & Plan:  
 
Acute CVA 
- MRI revealed L medulopontine CVA 
- CT/CTA negative - Echo with EF 50-55% - Neurology following - Resume ASA once able to take PO Dysphagia - secondary to CVA 
- Place NG and start trickle feeds.  
- Long discussion with family today regarding PEG. Daughter and son will discuss today  
- KUB post NG placement - Speech following Possible  hematemesis - with esaphagitis on EGD  
- hold lovenox - Resume ASA - Monitor hemoglobin closely  
- Continue PPI, switch to PO if able HTN - holding oral medications for now due to dysphagia, but may resume if NG placed, Monitor, PRN IV medications Hyponatremia - hypovolemic, now becoming hypernatremic. DC IVF and monitor Code status: DNR 
DVT prophylaxis: SCDs due to [facility] Care Plan discussed with: Patient/Family Anticipated Disposition: SNF/LTC, HH PT, OT, RN and SAH/Rehab Anticipated Discharge: Greater than 48 hours Hospital Problems  Date Reviewed: 4/2/2020 Codes Class Noted POA  
 CVA (cerebral vascular accident) ([facility] [address] ICD-10-CM: I63.9 ICD-9-CM: 434.91  2/21/2021 Unknown Review of Systems: A comprehensive review of systems was negative except for that written in the HPI. Vital Signs:  
 Last 24hrs VS reviewed since prior progress note. Most recent are: 
Visit Vitals BP (!) 172/95 (BP 1 Location: Left arm, BP Patient Position: At rest) Pulse 99 Temp 98.5 °F (36.9 °C) Resp 17 Ht 5' 2.99\" (1.6 m) Wt 57.2 kg (126 lb 3.2 oz) SpO2 92% BMI 22.36 kg/m² No intake or output data in the 24 hours ending 02/26/21 1551 Physical Examination:  
 
I had a face to face encounter with this patient and independently examined them on 2/26/2021 as outlined below: 
 
     
Constitutional:  No acute distress, cooperative, pleasant ENT:  Oral mucosa moist, oropharynx benign. R facial droop Resp:  CTA bilaterally. No wheezing/rhonchi/rales. No accessory muscle use CV:  Regular rhythm, normal rate, no murmurs, gallops, rubs GI:  Soft, non distended, non tender. normoactive bowel sounds, no hepatosplenomegaly Musculoskeletal:  No edema, warm, 2+ pulses throughout Neurologic:  Moves all extremities. AAOx1 (self) CN II-XII reviewed Skin:  Good turgor, no rashes or ulcers Data Review:  
 Review and/or order of clinical lab test 
Review and/or order of tests in the radiology section of CPT Review and/or order of tests in the medicine section of CPT Labs:  
 
Recent Labs  
  02/26/21 
0353 02/24/21 
6137 WBC 11.2* 13.0* HGB 14.6 14.3 HCT 45.4 44.5  263 Recent Labs  
  02/26/21 
0353 02/25/21 
0258 02/24/21 
2577 * 147* 143  
K 3.5 3.6 3.6 * 114* 111* CO2 22 23 22 BUN 27* 29* 22* CREA 1.03 0.97 0.85 * 98 87  
CA 8.8 9.1 8.8 MG 2.7*  --   --   
PHOS 2.6  --   -- No results for input(s): ALT, AP, TBIL, TBILI, TP, ALB, GLOB, GGT, AML, LPSE in the last 72 hours. No lab exists for component: SGOT, GPT, AMYP, HLPSE No results for input(s): INR, PTP, APTT, INREXT, INREXT in the last 72 hours. No results for input(s): FE, TIBC, PSAT, FERR in the last 72 hours. No results found for: FOL, RBCF No results for input(s): PH, PCO2, PO2 in the last 72 hours. No results for input(s): CPK, CKNDX, TROIQ in the last 72 hours. No lab exists for component: CPKMB Lab Results Component Value Date/Time Cholesterol, total 173 02/22/2021 12:50 AM  
 HDL Cholesterol 50 02/22/2021 12:50 AM  
 LDL, calculated 106.6 (H) 02/22/2021 12:50 AM  
 Triglyceride 82 02/22/2021 12:50 AM  
 CHOL/HDL Ratio 3.5 02/22/2021 12:50 AM  
 
Lab Results Component Value Date/Time Glucose (POC) 94 02/26/2021 12:00 PM  
 Glucose (POC) 89 02/26/2021 06:05 AM  
 Glucose (POC) 86 02/26/2021 12:02 AM  
 Glucose (POC) 105 (H) 02/25/2021 06:19 PM  
 Glucose (POC) 96 02/25/2021 12:08 PM  
 
Lab Results Component Value Date/Time Color YELLOW/STRAW 02/21/2021 08:03 PM  
 Appearance CLEAR 02/21/2021 08:03 PM  
 Specific gravity 1.005 02/21/2021 08:03 PM  
 pH (UA) 8.5 (H) 02/21/2021 08:03 PM  
 Protein 30 (A) 02/21/2021 08:03 PM  
 Glucose Negative 02/21/2021 08:03 PM  
 Ketone Negative 02/21/2021 08:03 PM  
 Bilirubin Negative 02/21/2021 08:03 PM  
 Urobilinogen 0.2 02/21/2021 08:03 PM  
 Nitrites Negative 02/21/2021 08:03 PM  
 Leukocyte Esterase Negative 02/21/2021 08:03 PM  
 Epithelial cells FEW 02/21/2021 08:03 PM  
 Bacteria Negative 02/21/2021 08:03 PM  
 WBC 0-4 02/21/2021 08:03 PM  
 RBC 5-10 02/21/2021 08:03 PM  
 
 
 
Medications Reviewed:  
 
Current Facility-Administered Medications Medication Dose Route Frequency  aspirin chewable tablet 81 mg  81 mg Per NG tube DAILY  sucralfate (CARAFATE) tablet 1 g  1 g Oral AC&HS  
 metoprolol (LOPRESSOR) injection 5 mg  5 mg IntraVENous Q8H  
 OLANZapine (ZyPREXA zydis) disintegrating tablet 5 mg  5 mg Oral QHS  LORazepam (ATIVAN) injection 1 mg  1 mg IntraVENous Q4H PRN  
 atorvastatin (LIPITOR) tablet 40 mg  40 mg Oral QHS  labetaloL (NORMODYNE;TRANDATE) injection 5 mg  5 mg IntraVENous Q6H PRN  pantoprazole (PROTONIX) 40 mg in 0.9% sodium chloride 10 mL injection  40 mg IntraVENous Q12H  
 [Held by provider] metoprolol succinate (TOPROL-XL) XL tablet 50 mg  50 mg Oral DAILY  sertraline (ZOLOFT) tablet 50 mg  50 mg Oral DAILY  sodium chloride (NS) flush 5-40 mL  5-40 mL IntraVENous Q8H  
 sodium chloride (NS) flush 5-40 mL  5-40 mL IntraVENous PRN  
 acetaminophen (TYLENOL) tablet 650 mg  650 mg Oral Q6H PRN Or  
 acetaminophen (TYLENOL) suppository 650 mg  650 mg Rectal Q6H PRN  polyethylene glycol (MIRALAX) packet 17 g  17 g Oral DAILY PRN  promethazine (PHENERGAN) tablet 12.5 mg  12.5 mg Oral Q6H PRN Or  
 ondansetron (ZOFRAN) injection 4 mg  4 mg IntraVENous Q6H PRN  
 [Held by provider] enoxaparin (LOVENOX) injection 40 mg  40 mg SubCUTAneous DAILY  
 
______________________________________________________________________ EXPECTED LENGTH OF STAY: 3d 0h 
ACTUAL LENGTH OF STAY:          5 
 
            
Greg Lopes MD

## 2021-02-26 NOTE — PROGRESS NOTES
2045 Patient pulled dobhoff out 2330 aspirin suppository administered, new IV placed [address] placed, KUB order placed 0035 KUB done 0120 Patient pulled out dobhoff 0130 Dobhoff replaced, KUB order placed 0145 Patient complaining of something in throat. This RN can see dobhoff coming back up into mouth. Pulled dobhoff out as XRay arrived. Attempted again to place dobhoff however, patient's nose began to bleed and patient unable to \"swallow\" dobhoff to place again. Will pass on to dayshift that unsuccessful in replacing dobhoff Bedside shift change report given to Uday Luna RN (oncoming nurse) by Richy Montero RN (offgoing nurse). Report included the following information SBAR, Cardiac Rhythm nsr and Dual Neuro Assessment.

## 2021-02-26 NOTE — CONSULTS
Palliative Medicine Consult Donis: [phone] ([id number]) Patient Name: Travis Ramos YOB: 1940 Date of Initial Consult: 21 Reason for Consult: Care decisions Requesting Provider: Dr. Jabier Lacey Primary Care Physician: Jennifer Jean MD 
 
 SUMMARY:  
Travis Ramos is a [de-identified] y.o. male with a past history of arthritis, hypertension, prostate atrophy, previous CVAs, and dementia, who was admitted on 2021 from his assisted living facility with a diagnosis of acute CVA. He presented to the ED with complaints of a facial droop and dysphagia. He was admitted for further work up and management. MRI revealed an acute left medulopontine CVA. He also had an episode of hematemesis. EGD revealed esophagitis. Speech therapy has evaluated him and recommend NPO at this time. NG tube feedings are being started. Current medical issues leading to Palliative Medicine involvement include: acute CVA, dysphagia, altered mental status, need to discuss care decisions. Social: He is a  and his wife  last March from cancer. He has two children, Julita Bangura and SUE. He has a dog, Vicky, that is his whole world. PALLIATIVE DIAGNOSES:  
1. Goals of care 2. Dysphagia 3. Feeding difficulties 4. Acute CVA 5. Impaired mobility 6. Altered mental status 7. Dementia 8. Hypertension PLAN:  
1. Prior to seeing patient, the medical record was reviewed. 2. Patient seen at the bedside with his daughter BODØ present. Palliative medicine services introduced. 3. The patient was lethargic during the visit and did not interact. He had to be reminded multiple times to put his arm down as he kept trying to grab his NG tube. 4. I talked with daughter BODØ about the current medical issues related to the acute CVA, including the altered mental status, dysphagia, and feeding difficulties. He has the NG tube placed, but feeding have not been started yet.  BODØ is hopeful that once the feedings have been started and he can be placed back on his usual medications that he will wake up some more and be able to participate in goals of care conversations. Lasha Romo reports that they have spoken to the medical team about the possible need for a more permanent feeding tube to be placed. Lasha Romo reports that the family is reviewing the patient's advanced medical directive for guidance on how to proceed with this. She is hopeful that he will regain some further swallowing function since he did have a slight improvement today when he initiated a swallow to try to take an ice chip down. We talked about waiting to see how the next few days go and see if he has any further improvement in his swallowing. 5. The family's goal is to get him back to his assisted living facility as soon as possible. They are open to him going to acute rehab if it is short term. They would prefer that he go back to his [facility] with home health if possible. They would like to see how he improves over the next few days before making any official decisions about next steps. 10. Lasha Romo also shares that the patient's dog Mor Shin is his whole world. He enjoys walking her and taking care of her. Lasha Romo thinks that the patient would feel that he has good quality of life as long as he can be with his dog, even if he had neurological deficits. 7. Initial consult note routed to primary continuity provider and/or primary health care team members 8. Communicated plan of care with: Palliative IDTClementina [facility] Team 
 
 GOALS OF CARE / TREATMENT PREFERENCES:  
 
GOALS OF CARE: 
Patient/Health Care Proxy Stated Goals: Rehabilitation TREATMENT PREFERENCES:  
Code Status: DNR Advance Care Planning: 
[x] [facility] Interdisciplinary Team has updated the ACP Navigator with [address] and Patient Capacity Primary Decision Maker: Bridgett Valle - [phone]   Secondary Decision Maker: Sindhu Lopez - [phone] No flowsheet data found. Medical Interventions: Limited additional interventions Other Instructions: 
Artificially Administered Nutrition: Feeding tube for a defined trial period Other: As far as possible, the palliative care team has discussed with patient / health care proxy about goals of care / treatment preferences for patient. HISTORY:  
 
History obtained from: chart, daughter CHIEF COMPLAINT: Left facial droop, dysphagia HPI/SUBJECTIVE: The patient is:  
[] Verbal and participatory [x] Non-participatory due to: altered mental status, lethargic Clinical Pain Assessment (nonverbal scale for severity on nonverbal patients):  
Clinical Pain Assessment Severity: 0 Activity (Movement): Lying quietly, normal position Duration: for how long has pt been experiencing pain (e.g., 2 days, 1 month, years) Frequency: how often pain is an issue (e.g., several times per day, once every few days, constant) FUNCTIONAL ASSESSMENT:  
 
Palliative Performance Scale (PPS): PPS: 40 PSYCHOSOCIAL/SPIRITUAL SCREENING:  
 
Palliative IDT has assessed this patient for cultural preferences / practices and a referral made as appropriate to needs (Cultural Services, Patient Advocacy, Ethics, etc.) Any spiritual / Mormonism concerns: 
[] Yes /  [x] No 
 
Caregiver Burnout: 
[] Yes /  [x] No /  [] No Caregiver Present Anticipatory grief assessment:  
[x] Normal  / [] Maladaptive ESAS Anxiety: ESAS Depression:    
 
 
 REVIEW OF SYSTEMS:  
 
Positive and pertinent negative findings in ROS are noted above in HPI. The following systems were [] reviewed / [x] unable to be reviewed as noted in HPI Other findings are noted below. Systems: constitutional, ears/nose/mouth/throat, respiratory, gastrointestinal, genitourinary, musculoskeletal, integumentary, neurologic, psychiatric, endocrine. Positive findings noted below.  
Modified ESAS Completed by: provider Fatigue: 10 Drowsiness: 9 Pain: 0 Dyspnea: 0 PHYSICAL EXAM:  
 
From RN flowsheet: 
Wt Readings from Last 3 Encounters:  
02/26/21 126 lb 3.2 oz (57.2 kg) 02/22/21 146 lb 6.2 oz (66.4 kg) 02/21/21 146 lb (66.2 kg) Blood pressure (!) 172/95, pulse 99, temperature 98.5 °F (36.9 °C), resp. rate 17, height 5' 2.99\" (1.6 m), weight 126 lb 3.2 oz (57.2 kg), SpO2 92 %. Pain Scale 1: Numeric (0 - 10) Pain Intensity 1: 0 Last bowel movement, if known:  
 
Constitutional: frail, resting with eyes closed ENMT: NG tube intact Cardiovascular: regular rhythm Respiratory: breathing not labored, symmetric Gastrointestinal: soft non-tender Musculoskeletal: no deformity, no tenderness to palpation Skin: warm, dry Neurologic: following some commands, lethargic HISTORY:  
 
Active Problems: 
  CVA (cerebral vascular accident) ([address] (2/21/2021) Past Medical History:  
Diagnosis Date  Arthritis  Hearing decreased   
 getting worse per spouse  High blood pressure  Melanoma in situ of left shoulder ([address] 1975  Prostate atrophy   
 takes finesteride Past Surgical History:  
Procedure Laterality Date  HX ROTATOR CUFF REPAIR Right 2011  UPPER GI ENDOSCOPY,BIOPSY  2/21/2021 No family history on file. History reviewed, no pertinent family history. Social History Tobacco Use  Smoking status: Never Smoker  Smokeless tobacco: Never Used Substance Use Topics  Alcohol use: Not on file Comment: seldom No Known Allergies Current Facility-Administered Medications Medication Dose Route Frequency  aspirin chewable tablet 81 mg  81 mg Per NG tube DAILY  sucralfate (CARAFATE) tablet 1 g  1 g Oral AC&HS  
 metoprolol (LOPRESSOR) injection 5 mg  5 mg IntraVENous Q8H  
 OLANZapine (ZyPREXA zydis) disintegrating tablet 5 mg  5 mg Oral QHS  LORazepam (ATIVAN) injection 1 mg  1 mg IntraVENous Q4H PRN  
 atorvastatin (LIPITOR) tablet 40 mg  40 mg Oral QHS  labetaloL (NORMODYNE;TRANDATE) injection 5 mg  5 mg IntraVENous Q6H PRN  pantoprazole (PROTONIX) 40 mg in 0.9% sodium chloride 10 mL injection  40 mg IntraVENous Q12H  
 [Held by provider] metoprolol succinate (TOPROL-XL) XL tablet 50 mg  50 mg Oral DAILY  sertraline (ZOLOFT) tablet 50 mg  50 mg Oral DAILY  sodium chloride (NS) flush 5-40 mL  5-40 mL IntraVENous Q8H  
 sodium chloride (NS) flush 5-40 mL  5-40 mL IntraVENous PRN  
 acetaminophen (TYLENOL) tablet 650 mg  650 mg Oral Q6H PRN Or  
 acetaminophen (TYLENOL) suppository 650 mg  650 mg Rectal Q6H PRN  polyethylene glycol (MIRALAX) packet 17 g  17 g Oral DAILY PRN  promethazine (PHENERGAN) tablet 12.5 mg  12.5 mg Oral Q6H PRN Or  
 ondansetron (ZOFRAN) injection 4 mg  4 mg IntraVENous Q6H PRN  
 [Held by provider] enoxaparin (LOVENOX) injection 40 mg  40 mg SubCUTAneous DAILY  
 
 
 
 LAB AND IMAGING FINDINGS:  
 
Lab Results Component Value Date/Time WBC 11.2 (H) 02/26/2021 03:53 AM  
 HGB 14.6 02/26/2021 03:53 AM  
 PLATELET 387 70/12/1775 03:53 AM  
 
Lab Results Component Value Date/Time Sodium 152 (H) 02/26/2021 03:53 AM  
 Potassium 3.5 02/26/2021 03:53 AM  
 Chloride 119 (H) 02/26/2021 03:53 AM  
 CO2 22 02/26/2021 03:53 AM  
 BUN 27 (H) 02/26/2021 03:53 AM  
 Creatinine 1.03 02/26/2021 03:53 AM  
 Calcium 8.8 02/26/2021 03:53 AM  
 Magnesium 2.7 (H) 02/26/2021 03:53 AM  
 Phosphorus 2.6 02/26/2021 03:53 AM  
  
Lab Results Component Value Date/Time Alk. phosphatase 57 02/21/2021 03:39 PM  
 Protein, total 7.3 02/21/2021 03:39 PM  
 Albumin 3.8 02/21/2021 03:39 PM  
 Globulin 3.5 02/21/2021 03:39 PM  
 
Lab Results Component Value Date/Time INR 1.1 02/22/2021 10:51 AM  
 Prothrombin time 11.5 (H) 02/22/2021 10:51 AM  
 aPTT 29.2 02/22/2021 10:51 AM  
  
No results found for: IRON, FE, TIBC, IBCT, PSAT, FERR No results found for: PH, PCO2, PO2 No components found for: [place] No results found for: CPK, CKMB Total time: 70 min Counseling / coordination time, spent as noted above: 65 min 
> 50% counseling / coordination?: yes Prolonged service was provided for  []30 min   []75 min in face to face time in the presence of the patient, spent as noted above. Time Start:  
Time End:  
Note: this can only be billed with 03445 (initial) or 98500 (follow up). If multiple start / stop times, list each separately.

## 2021-02-26 NOTE — PROGRESS NOTES
Bedside and Verbal shift change report given to [facility] (oncoming nurse) by Aziza Jones (offgoing nurse). Report included the following information SBAR, Kardex, MAR, Cardiac Rhythm NSR, Quality Measures and Dual Neuro Assessment.

## 2021-02-26 NOTE — PROGRESS NOTES
SLP Contact Note SLP treatment complete. Pt intermittently initiating a swallow (which is an improvement) but still mostly with laryngeal bobbing. Therefore, recommend pt continue ice chip trials after oral care. May consider imaging early next week if indicated. Full note to follow. Thank you, Mabel Talamantes M.Ed, CCC-SLP Speech-Language Pathologist

## 2021-02-26 NOTE — PROGRESS NOTES
Neurology Progress Portia Orellana NP Date of admission: 2/21/2021 Patient: Demario Lanier MRN: [id number]  SSN: [id number] YOB: 1940  Age: [de-identified] y.o. Sex: male Subjective: HPI: Demario Lanier is a [de-identified] y.o. male RH dominant male admitted to [facility] on 2/21/21 after he previously was at [facility] for dysphagia having undergone EGD with concern for possible esophagitis. Patient presented to [facility] after his son appreciated left facial weakness as well, unclear when patient was last seen by his son. On presentation was felt to have evidence for a central 7th nerve palsy with concern for stroke. On exam patient admits that he is having trouble swallowing but is inconsistent in his timeline saying it started yesterday at other times saying a few weeks. At initial assessment he did not have any weakness, numbness, speech or language impairment but does think he had a stroke due to his dysphagia. Denies ever having had a stroke before. Interval 02/26/21: MRI 2/25 with small acute infarction left pontomedullary junction and other remote deep stucture infarcts including the R selena. Aspirin started 2/25. Assay therapeutic. 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 Review of systems Review of systems negative except as detailed in the HPI, interval, PMH and A&P Past Medical History:  
Diagnosis Date  Arthritis  Hearing decreased   
 getting worse per spouse  High blood pressure  Melanoma in situ of left shoulder ([facility] [address] 1975  Prostate atrophy   
 takes finesteride Past Surgical History:  
Procedure Laterality Date  HX ROTATOR CUFF REPAIR Right 2011  UPPER GI ENDOSCOPY,BIOPSY  2/21/2021 No family history on file. Social History Tobacco Use  Smoking status: Never Smoker  Smokeless tobacco: Never Used Substance Use Topics  Alcohol use: Not on file Comment: seldom Prior to Admission medications Medication Sig Start Date End Date Taking? Authorizing Provider  
clotrimazole (LOTRIMIN) 1 % topical cream Apply  to affected area two (2) times a day. 4/2/20   Shikha Diego MD  
ondansetron (ZOFRAN ODT) 4 mg disintegrating tablet Take 1 Tab by mouth every eight (8) hours as needed for Nausea or Vomiting. 4/2/20   Shikha Diego MD  
miscellaneous medical supply misc Dtap for Pertusis, [place] and diptheria 7/18/19   Jason Hunt MD  
sertraline (ZOLOFT) 50 mg tablet Take 1 Tab by mouth daily. For anxiety 7/18/19   Jason Hunt MD  
atorvastatin (LIPITOR) 10 mg tablet Take 1 Tab by mouth daily. 7/18/19   Jason Hunt MD  
metoprolol succinate (TOPROL-XL) 50 mg XL tablet Take 1 Tab by mouth daily. 7/18/19   Jason Hunt MD  
omeprazole (PRILOSEC) 20 mg capsule Take 1 Cap by mouth daily. 7/18/19   Jason Hunt MD  
lisinopril-hydroCHLOROthiazide (PRINZIDE, ZESTORETIC) 20-25 mg per tablet Take 1 Tab by mouth daily. 5/7/19   Jason Hunt MD  
finasteride (PROSCAR) 5 mg tablet Take 1 Tab by mouth daily. 5/7/19   Jason Hunt MD  
tadalafil (CIALIS) 5 mg tablet One tablet once a day as needed for erectile dysfunction 6/19/18   Jason Hunt MD  
 
Current Facility-Administered Medications Medication Dose Route Frequency Provider Last Rate Last Admin  aspirin chewable tablet 81 mg  81 mg Per NG tube DAILY Ted Guillen NP   Stopped at 02/26/21 0900  
 sucralfate (CARAFATE) tablet 1 g  1 g Oral AC&HS Mima Esquivel NP   Stopped at 02/23/21 1630  
 metoprolol (LOPRESSOR) injection 5 mg  5 mg IntraVENous Q8H Umair Beckford MD   5 mg at 02/26/21 0544  OLANZapine (ZyPREXA zydis) disintegrating tablet 5 mg  5 mg Oral QHS Umair Beckford MD   5 mg at 02/25/21 3349  LORazepam (ATIVAN) injection 1 mg  1 mg IntraVENous Q4H PRN Ricardo Sun MD   1 mg at 02/23/21 1612  atorvastatin (LIPITOR) tablet 40 mg  40 mg Oral QHS Joceline Faustin MD   Stopped at 02/22/21 2200  
 labetaloL (NORMODYNE;TRANDATE) injection 5 mg  5 mg IntraVENous Q6H PRN Umair Beckford MD   5 mg at 21 1738  pantoprazole (PROTONIX) 40 mg in 0.9% sodium chloride 10 mL injection  40 mg IntraVENous Q12H KEYONNA Lee   40 mg at 21 1046  [Held by provider] metoprolol succinate (TOPROL-XL) XL tablet 50 mg  50 mg Oral DAILY Maryan Beckford MD   Stopped at 21 0900  sertraline (ZOLOFT) tablet 50 mg  50 mg Oral DAILY Lucy Kramer MD   Stopped at 21 0900  
 sodium chloride (NS) flush 5-40 mL  5-40 mL IntraVENous Q8H Umair Beckford MD   10 mL at 21 0550  sodium chloride (NS) flush 5-40 mL  5-40 mL IntraVENous PRN Lucy Kramer MD      
 acetaminophen (TYLENOL) tablet 650 mg  650 mg Oral Q6H PRN Umair Beckford MD      
 Or  
 acetaminophen (TYLENOL) suppository 650 mg  650 mg Rectal Q6H PRN Lucy Kramer MD   650 mg at 21 0244  polyethylene glycol (MIRALAX) packet 17 g  17 g Oral DAILY PRN Lucy Kramer MD      
 promethazine (PHENERGAN) tablet 12.5 mg  12.5 mg Oral Q6H PRN Umair Beckford MD      
 Or  
 ondansetron (ZOFRAN) injection 4 mg  4 mg IntraVENous Q6H PRN Lucy Kramer MD      
 [Held by provider] enoxaparin (LOVENOX) injection 40 mg  40 mg SubCUTAneous DAILY Maryan Beckford MD   Stopped at 21 0900 No Known Allergies Objective:  
 
Vitals:  
 21 0148 21 0544 21 1963 21 1003 BP: (!) 174/154 (!) 158/88 (!) 164/88 (!) 158/100 Pulse: (!) 102 96 78 100 Resp: 17 Temp: 99.4 °F (37.4 °C)  97.9 °F (36.6 °C) 98.7 °F (37.1 °C) SpO2: 92%  90% 92% Temp (24hrs), Av.6 °F (37 °C), Min:97.4 °F (36.3 °C), Max:99.6 °F (37.6 °C) O2 Device: Room air O2 Flow Rate (L/min): 2 l/min No intake or output data in the 24 hours ending 21 1239 General: In NAD. Sky Bodily Cardiac: RRR Lungs: Unlabored breathing. Abdomen: Soft/NT/non-distended. Extremities. No edema.  
 
Neurologic Exam: 
Mental Status:  Easily aroused, oriented to self only, delirious Language:    Grossly intact fluency and comprehension. Mild dysarthria Cranial Nerves:   Pupils equal, round and reactive to light. Visual fields intact. Extraocular movements intact w/o nystagmus Facial sensation diminished on the left, no extinction Facial activation impaired entire L face and includes mild ptosis Hearing grossly intact. Motor:    Bulk and tone normal.  
   4/5 left elbow extension, otherwise 5/5 strength in all extremities. No pronator drift. Mild high frequency resting tremor, L > R Sensation:    Sensation intact throughout to light touch. Coordination & Gait: No ataxia with finger to nose. Gait deferred LABS: 
Recent Labs  
  02/26/21 
0353 02/24/21 
9009 WBC 11.2* 13.0* HGB 14.6 14.3 HCT 45.4 44.5  263 Recent Labs  
  02/26/21 
0353 02/25/21 
0258 02/24/21 
1516 * 147* 143  
K 3.5 3.6 3.6 * 114* 111* CO2 22 23 22 BUN 27* 29* 22* CREA 1.03 0.97 0.85 * 98 87  
CA 8.8 9.1 8.8 MG 2.7*  --   --   
PHOS 2.6  --   -- No results for input(s): ALT, AP, TBIL, TBILI, TP, ALB, GLOB, GGT, AML, LPSE in the last 72 hours. No lab exists for component: SGOT, GPT, AMYP, HLPSE No results for input(s): INR, PTP, APTT, INREXT, INREXT in the last 72 hours. No results for input(s): PHI, PCO2I, PO2I, HCO3I in the last 72 hours. No results for input(s): CPK, CKNDX, TROIQ in the last 72 hours. No lab exists for component: CPKMB Lab Results Component Value Date/Time Cholesterol, total 173 02/22/2021 12:50 AM  
 HDL Cholesterol 50 02/22/2021 12:50 AM  
 LDL, calculated 106.6 (H) 02/22/2021 12:50 AM  
 Triglyceride 82 02/22/2021 12:50 AM  
 CHOL/HDL Ratio 3.5 02/22/2021 12:50 AM  
 
Lab Results Component Value Date/Time  Glucose (POC) 94 02/26/2021 12:00 PM  
 Glucose (POC) 89 02/26/2021 06:05 AM  
 Glucose (POC) 86 02/26/2021 12:02 AM  
 Glucose (POC) 105 (H) 02/25/2021 06:19 PM  
 Glucose (POC) 96 02/25/2021 12:08 PM  
 
Lab Results Component Value Date/Time Color YELLOW/STRAW 02/21/2021 08:03 PM  
 Appearance CLEAR 02/21/2021 08:03 PM  
 Specific gravity 1.005 02/21/2021 08:03 PM  
 pH (UA) 8.5 (H) 02/21/2021 08:03 PM  
 Protein 30 (A) 02/21/2021 08:03 PM  
 Glucose Negative 02/21/2021 08:03 PM  
 Ketone Negative 02/21/2021 08:03 PM  
 Bilirubin Negative 02/21/2021 08:03 PM  
 Urobilinogen 0.2 02/21/2021 08:03 PM  
 Nitrites Negative 02/21/2021 08:03 PM  
 Leukocyte Esterase Negative 02/21/2021 08:03 PM  
 Epithelial cells FEW 02/21/2021 08:03 PM  
 Bacteria Negative 02/21/2021 08:03 PM  
 WBC 0-4 02/21/2021 08:03 PM  
 RBC 5-10 02/21/2021 08:03 PM  
 
 
No results for input(s): FE, TIBC, PSAT, FERR in the last 72 hours. No results found for: FOL, RBCF No results for input(s): PH, PCO2, PO2 in the last 72 hours. No results for input(s): CPK, CKNDX, TROIQ in the last 72 hours. No lab exists for component: CPKMB Imaging: Xr Abd (kub) Result Date: 2/25/2021 No acute process. Dobbhoff tube tip projects over the fundus. Xr Abd Port  1 V Result Date: 2/26/2021 Dobbhoff tube tip overlies the gastric body. Nonspecific intestinal gas pattern. CT Results: 
Results from [facility] Encounter encounter on 02/21/21 CTA CODE NEURO HEAD AND NECK W CONT Narrative *PRELIMINARY REPORT* No large vessel occlusion. Preliminary report was provided by Dr. Jordan Redding, the on-call radiologist, at [phone] 
hours Final report to follow. *END PRELIMINARY REPORT* CLINICAL HISTORY: Code stroke EXAMINATION:  CT ANGIOGRAPHY HEAD AND NECK, CT PERFUSION 
 
COMPARISON: None TECHNIQUE:  Following the uneventful administration of iodinated contrast 
material, axial CT angiography of the head and neck was performed. Delayed axial 
images through the head were also obtained. Coronal and sagittal reconstructions 
were obtained.  Manual postprocessing of images was performed. 3-D  Sagittal 
maximal intensity projection images were obtained. 3-D Coronal maximal 
intensity projections were obtained. CT brain perfusion was performed with 
generation of hemodynamic maps of multiple parameters, including cerebral blood 
flow, cerebral blood volume, mean transit time, and TMAX. CT dose reduction was 
achieved through use of a standardized protocol tailored for this examination 
and automatic exposure control for dose modulation. FINDINGS: 
 
DELAYED ENHANCEMENT HEAD CT Small remote lacunar infarct of the right selena. No intra or extra-axial mass or 
collection. Periventricular white matter disease compatible with chronic small 
vessel ischemic change. Ventricles are normal in size and configuration. The 
basal cisterns are patent. Dural venous sinuses are patent. No abnormal parenchymal or meningeal 
enhancement. Mastoid air cells and paranasal sinuses are clear. CTA NECK: 
 
Great vessels: Normal arch anatomy with the origins patent. Right subclavian artery: Patent Left subclavian artery: Patent Right common carotid artery: Patent Left common carotid artery: Patent Cervical right internal carotid artery: Patent with no significant stenosis by NASCET criteria. Cervical left internal carotid artery: Patent with no significant stenosis by NASCET criteria. Right vertebral artery: Patent Left vertebral artery: Patent The lung apices are clear. 1.7 cm left thyroid nodule. No cervical 
lymphadenopathy. Measurements utilize NASCET criteria. CTA HEAD: 
 
Right cavernous internal carotid artery: Mild atherosclerotic disease without 
hemodynamically significant stenosis. Left cavernous internal carotid artery: Mild atherosclerotic disease without 
hemodynamically significant stenosis. Anterior cerebral arteries: Patent Anterior communicating artery: Patent Right middle cerebral artery: Patent Left middle cerebral artery: Patent Posterior communicating arteries: Patent on the left, hypoplastic on the right. Posterior cerebral arteries: Patent. Bilateral P1 segments are patent. Short 
segment stenosis of the distal right P2 segment (310-93). Basilar artery: Patent Distal vertebral arteries: Patent No evidence for intracranial aneurysm. CT Perfusion: 
Normal CT perfusion. Impression 1. No acute vascular abnormality. No large vessel occlusion. Normal perfusion. 2. Short segment stenosis of the right P2 segment. 3. Sequela of chronic small vessel ischemic disease. Small remote lacunar 
infarct in the right selena. 4. 1.7 cm left thyroid nodule. CT CODE NEURO PERF W CBF Narrative *PRELIMINARY REPORT* No perfusion defect Preliminary report was provided by Dr. Gregg Ortega, the on-call radiologist, at 1525 
hours Final report to follow. *END PRELIMINARY REPORT* CLINICAL HISTORY: Code stroke EXAMINATION:  CT ANGIOGRAPHY HEAD AND NECK, CT PERFUSION 
 
COMPARISON: None TECHNIQUE:  Following the uneventful administration of iodinated contrast 
material, axial CT angiography of the head and neck was performed. Delayed axial 
images through the head were also obtained. Coronal and sagittal reconstructions 
were obtained. Manual postprocessing of images was performed. 3-D  Sagittal 
maximal intensity projection images were obtained. 3-D Coronal maximal 
intensity projections were obtained. CT brain perfusion was performed with 
generation of hemodynamic maps of multiple parameters, including cerebral blood 
flow, cerebral blood volume, mean transit time, and TMAX. CT dose reduction was 
achieved through use of a standardized protocol tailored for this examination 
and automatic exposure control for dose modulation. FINDINGS: 
 
DELAYED ENHANCEMENT HEAD CT Small remote lacunar infarct of the right selena. No intra or extra-axial mass or 
collection.  Periventricular white matter disease compatible with chronic small 
vessel ischemic change. Ventricles are normal in size and configuration. The 
basal cisterns are patent. Dural venous sinuses are patent. No abnormal parenchymal or meningeal 
enhancement. Mastoid air cells and paranasal sinuses are clear. CTA NECK: 
 
Great vessels: Normal arch anatomy with the origins patent. Right subclavian artery: Patent Left subclavian artery: Patent Right common carotid artery: Patent Left common carotid artery: Patent Cervical right internal carotid artery: Patent with no significant stenosis by NASCET criteria. Cervical left internal carotid artery: Patent with no significant stenosis by NASCET criteria. Right vertebral artery: Patent Left vertebral artery: Patent The lung apices are clear. 1.7 cm left thyroid nodule. No cervical 
lymphadenopathy. Measurements utilize NASCET criteria. CTA HEAD: 
 
Right cavernous internal carotid artery: Mild atherosclerotic disease without 
hemodynamically significant stenosis. Left cavernous internal carotid artery: Mild atherosclerotic disease without 
hemodynamically significant stenosis. Anterior cerebral arteries: Patent Anterior communicating artery: Patent Right middle cerebral artery: Patent Left middle cerebral artery: Patent Posterior communicating arteries: Patent on the left, hypoplastic on the right. Posterior cerebral arteries: Patent. Bilateral P1 segments are patent. Short 
segment stenosis of the distal right P2 segment (789-41). Basilar artery: Patent Distal vertebral arteries: Patent No evidence for intracranial aneurysm. CT Perfusion: 
Normal CT perfusion. Impression 1. No acute vascular abnormality. No large vessel occlusion. Normal perfusion. 2. Short segment stenosis of the right P2 segment. 3. Sequela of chronic small vessel ischemic disease. Small remote lacunar 
infarct in the right selena.  
4. 1.7 cm left thyroid nodule. CT CODE NEURO HEAD WO CONTRAST Narrative EXAM: CT CODE NEURO HEAD WO CONTRAST INDICATION: code S level 2 COMPARISON: None. CONTRAST: None. TECHNIQUE: Unenhanced CT of the head was performed using 5 mm images. Brain and 
bone windows were generated. Coronal and sagittal reformats. CT dose reduction 
was achieved through use of a standardized protocol tailored for this 
examination and automatic exposure control for dose modulation. FINDINGS: 
The ventricles and sulci are normal in size, shape and configuration. . There is 
mild periventricular white matter hypodensity. . There is no intracranial 
hemorrhage, extra-axial collection, or mass effect. The basilar cisterns are 
open. No CT evidence of acute infarct. The bone windows demonstrate no abnormalities. The visualized portions of the 
paranasal sinuses and mastoid air cells are clear. Impression No acute intracranial process identified. MRI Results: 
Results from Hospital Encounter encounter on 02/21/21 MRI BRAIN WO CONT Narrative INDICATION:   CVA EXAMINATION:  MRI BRAIN WO CONTRAST 
 
COMPARISON:  February 21, 2021 CT 
 
TECHNIQUE:  Multiplanar multisequence acquisition without contrast of the brain. FINDINGS:   
 
Ventricles:  Midline, no hydrocephalus. Brain Parenchyma/Brainstem:  Mild to moderate generalized atrophy. Mild chronic 
white matter disease throughout the supratentorial brain. Chronic right pontine 
infarction. Chronic right periatrial white matter infarction. Small chronic 
bilateral corona radiata/centrum semiovale infarctions. Small acute infarction 
left pontomedullary junction. Intracranial Hemorrhage:  No acute hemorrhage, though there are several foci of 
chronic microhemorrhage, involving the left thalamus and right occipital lobe. Basal Cisterns:  Normal.  
Flow Voids:  Normal. 
Additional Comments:  N/A.  
  
 Impression Atrophy and chronic white matter disease with small acute infarction left 
pontomedullary junction. Results from Hospital Encounter encounter on 08/03/19 MRI SHOULDER RT WO CONT Narrative MRI OF THE RIGHT SHOULDER WITHOUT CONTRAST. Clinical Indication: Right shoulder pain for two months after a fall Procedure: Multiplanar and multisequence MR images of the right shoulder were 
performed without gadolinium contrast. 
 
Comparison: No prior Findings:  
 
AC joint: Mild degenerative hypertrophy is appreciated the [facility] joint. Humeral 
head is high riding and abuts the undersurface of the lateral acromion. There is 
fluid signal present in the subacromial/subdeltoid bursa. Rotator cuff: A massive rotator cuff tendon tear is appreciated. Note, suture 
anchors are present in the greater tuberosity indicative of a prior rotator cuff 
tendon repair. The supraspinatus and infraspinatus tendons have retracted to the 
glenoid rim indicative of a full-thickness recurrent tear. A full-thickness tear 
is also noted of the subscapularis tendon with only a few inferior fibers 
remaining intact. Fatty atrophy is noted of both the subscapularis and 
supraspinatus tendons. The teres minor tendon remains intact. Biceps labral complex: There is a complete tear with extracapsular retraction of 
the long head of biceps tendon. The superior labrum is intact. The joint capsule 
in the axillary recess is intact. Glenohumeral joint: Small marginal osteophytes are noted off the medial aspect 
of the humeral head-neck junction and off the glenoid rim. Degenerative labral 
tearing is evident. There is a trace joint effusion. No abnormal soft tissue mass noted. Impression IMPRESSION: 
1. Massive recurrent rotator cuff tendon tear with full thickness tearing noted 
of the supraspinatus, infraspinatus, and subscapularis tendons. There is mild 
fatty atrophy of the supraspinatus and subscapularis muscle bellies.  
2. 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 
degenerative tearing evident. 3. Complete tear with extracapsular retraction of the long head of biceps 
tendon. 4. Mild degenerative hypertrophy of the [facility] joint. XR [facility] XR ABD PORT  1 V Impression Dobbhoff tube tip overlies the gastric body. Nonspecific intestinal 
gas pattern. XR ABD (KUB) Impression No acute process. Dobbhoff tube tip projects over the fundus. XR CHEST PORT Impression Mild bilateral reticular interstitial opacities may represent 
pulmonary edema. Bibasilar atelectasis. VAS/US Results (maximum last 3): No results found for this or any previous visit. TTE 
02/21/21 ECHO ADULT COMPLETE 02/22/2021 2/22/2021 Narrative · Saline contrast was given to evaluate for intracardiac shunt. · LV: Estimated LVEF is 50 - 55%. Normal cavity size and wall thickness. Low normal systolic function. Mild (grade 1) left ventricular diastolic  
dysfunction. · IAS: No atrial septal defect present. Agitated saline contrast study was  
performed. · AV: Aortic valve focal thickening present. · MV: Mitral valve non-specific thickening. · No patent foramen ovale visualized. Signed by: Oleksandr Mistry MD  
 
 
 
EKG Results for orders placed or performed during the hospital encounter of 02/21/21 EKG, 12 LEAD, INITIAL Result Value Ref Range Ventricular Rate 91 BPM  
 Atrial Rate 91 BPM  
 P-R Interval 190 ms QRS Duration 72 ms Q-T Interval 362 ms QTC Calculation (Bezet) 445 ms Calculated P Axis 55 degrees Calculated R Axis -19 degrees Calculated T Axis -13 degrees Diagnosis Normal sinus rhythm Voltage criteria for left ventricular hypertrophy Nonspecific ST and T wave abnormality No previous ECGs available Confirmed by Yovanny Marcum M.D., Jessica Ureña ([id number]) on 2/22/2021 12:17:03 PM 
  
 
 
Hospital Problems  Date Reviewed: 4/2/2020        Codes Class Noted POA  
 CVA (cerebral vascular accident) [facility]) ICD-10-CM: I63.9 ICD-9-CM: 434.91  2/21/2021 Unknown Assessment/Plan: PTA antiplatelet: No 
PTA AC: No 
PTA statin: Atorva 10 LDL: 106 ASA assay: 414 therapeutic ECHO: LVEF is 50 - 55%. No PFO or intracardiac shunt. No other significant findings of concern. EKG: NSR 
A1C: 5.7 Mook Gerber is a [de-identified] y.o. male with a history of some degree of dementia who was recently at [facility] for swallowing difficulties, where he underwent EGD with no significant diagnosis who subsequently presented 2/21/21 to [facility] for dysphagia and left facial weakness. Patient started on increased dose of atorvastatin as well as aspirin. Echocardiogram is unrevealing as is vessel imaging. He is NPO due to dysphagia and had an episode of hematemesis. Aspirin and DVT proph enoxaparin held. MRI today, 2/25, shows acute R pontomedullary infarct. Likely atherothrombosis given small solitary location in a deep structure. MRI shows several remote deep stucture infarcts including the R selena. Exam c/w newly found acute infarct. Slight LUE weakness likely sequelae of remote R pontine infarct. Stroke ASA 81 mg daily Continue atorvastatin 40 mg 
A1C within goal. 
BP goal now and long-term <140/<80 Stroke education PT/OT/SLP For delirium recommend - Encourage early mobilization (e.g. ambulation, at least OOB to chair bid) - Physical therapy and occupational therapy - Optimize sleep hygiene - Minimize noise and cluster activities around neuro checks at night - let pt sleep - Avoid television at night - Keep lights on and window shades up during the day - Keep lights off and window shades closed at night - Minimize naps during the day - Avoid over stimulating, exhausting the patient during the day with noise, too many visitors, visiting too long - Frequently re-orient the patient to date, time, and caretakers Avoid deliriogenic drugs - Sedative hypnotics - e.g., benzodiazepines - Opioids - over use AND under use - persistent, unmanaged pain can cause delirium - Steroids - dexamethasone, prednisone, methylprednisolone  
- Skeletal muscle relaxants - e.g., baclofen, methocarbamol, tizanidine - Anticholinergics - e.g., Glycopyrrolate, Hyoscyamine, Scopolamine - Antihistamines - e.g., diphenhydramine, chlorpheniramine Will likely improve once out of the hospital when he can re-establish a routine with familiar people. However, people with dementia frequently do not return fully to their pre-decompensation baseline. We will sign-off Please call with questions Thank you for this consult. Signed By: Cristofer Ayala NP  February 26, 2021 8:10 AM

## 2021-02-26 NOTE — PROGRESS NOTES
Problem: Self Care Deficits Care Plan (Adult) Goal: *Acute Goals and Plan of Care (Insert Text) Description: FUNCTIONAL STATUS PRIOR TO ADMISSION: Patient was independent and active without use of DME. Wife passed away 10 days ago. Patient not sure which instrumental ADLs he will still continue versus his family completing. HOME SUPPORT: The patient lived alone with family to provide assistance s/p wife passing. Occupational Therapy Goals Initiated 2/22/2021 1. Patient will perform standing 2 mins during upper body ADLs with RW with moderate assistance  within 7 day(s). 2.  Patient will perform lower body dressing with minimal assistance/contact guard assist within 7 day(s). 3.  Patient will perform bathing with minimal assistance/contact guard assist within 7 day(s). 4.  Patient will perform toilet transfers with minimal assistance/contact guard assist within 7 day(s). 5.  Patient will perform all aspects of toileting with minimal assistance/contact guard assist within 7 day(s). 6.  Patient will participate in upper extremity therapeutic exercise/activities with light resistance with supervision/set-up within 7 day(s). 7.  Patient will improve their Fugl Rosenthal score by 5 points in prep for ADLs within 7 days. Outcome: Progressing Towards Goal 
 OCCUPATIONAL THERAPY TREATMENT Patient: Luisito Mcknight [de-identified][de-identified] y.o. male) Date: 2/26/2021 Diagnosis: CVA (cerebral vascular accident) ([facility] [address] [I63.9] <principal problem not specified> Precautions: Fall Chart, occupational therapy assessment, plan of care, and goals were reviewed. ASSESSMENT Patient continues with skilled OT services and is progressing towards goals. Oriented to self only, reporting he is at a recreation facility. Patient talkative with tangential speech through out session, needing verbal redirection to task. He demonstrated increase indep with functional mobility with min Ax2 for initial sit to stand with increase independence to HHA following multiple sit <> stand trials. Progression for mobility necessary in toileting transfers as he ambulated x approx 10 ft with min Ax2 with HHA. He demonstrated ability to complete grooming tasks at EOB with verbal cues for completion. Current Level of Function Impacting Discharge (ADLs): grooming at EOB with SBA, self care transfer min Ax2 initially with increase to CGA x2 Other factors to consider for discharge: Patient presenting below baseline s/p CVA. Recommend IPR for additional skilled therapy services to return to [facility]. Good progression with acute rehab. PLAN : 
Patient continues to benefit from skilled intervention to address the above impairments. Continue treatment per established plan of care. to address goals. Recommend with staff: x2 assist to chair as appropriate otherwise bed in chair through out the day Recommend next OT session: standing ADL tasks if x2 assist for safety Recommendation for discharge: (in order for the patient to meet his/her long term goals) Therapy 3 hours per day 5-7 days per week This discharge recommendation: 
Has been made in collaboration with the attending provider and/or case management IF patient discharges home will need the following DME: TBD IPR SUBJECTIVE:  
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  OBJECTIVE DATA SUMMARY:  
Cognitive/Behavioral Status: 
Neurologic State: Confused; Alert Orientation Level: Oriented to person;Disoriented to place; Disoriented to situation;Disoriented to time Cognition: Decreased attention/concentration Functional Mobility and Transfers for ADLs: 
Bed Mobility: 
Supine to Sit: Contact guard assistance Sit to Supine: Stand-by assistance Transfers: 
Sit to Stand: Minimum assistance Balance: 
Sitting: Impaired Sitting - Static: Good (unsupported) Sitting - Dynamic: Fair (occasional) Standing: Impaired; With support Standing - Static: Constant support; Fair 
Standing - Dynamic : Constant support; April Lime ADL Intervention: 
  
 
Grooming Position Performed: Seated edge of bed Brushing/Combing Hair: Set-up; Supervision Lower Body Dressing Assistance Socks: Total assistance (dependent) Pain: 
No c/o or s/s Activity Tolerance:  
Fair and requires rest breaks After treatment patient left in no apparent distress:  
Supine in bed, Call bell within reach, and Bed / chair alarm activated, B wrist restraints replaced at end of session COMMUNICATION/COLLABORATION:  
The patients plan of care was discussed with: Physical therapist and Registered nurse. Opal Chan OT Time Calculation: 24 mins

## 2021-02-27 NOTE — PROGRESS NOTES
Bedside shift change report given to Beth Wang (oncoming nurse) by CRISPIN BRISENO (offgoing nurse). Report included the following information SBAR, Cardiac Rhythm nsr and Dual Neuro Assessment.

## 2021-02-27 NOTE — PROGRESS NOTES
Bedside and Verbal shift change report given to Monsoon Commerce (oncoming nurse) by Durga Stover (offgoing nurse). Report included the following information SBAR, Kardex, MAR, Cardiac Rhythm NSR, Quality Measures and Dual Neuro Assessment. Internal Medicine/Hospitalist    PRIMARY CARE PHYSICIAN:  No Pcp    PRIMARY CONSULTANTS/REFERRING PHYSICIAN:  - ER Physician    CHIEF COMPLAINT/REASON FOR ADMISSION/CONSULT:  - Persistent Abdominal Pain  - nausea and Vomiting  - 20 pound Weight lost    HISTORY OF PRESENT ILLNESS:     This is a 61-year-old male who was recently discharged from 17 December from our hospital after diagnosing idiopathic pancreatitis complicated by necrotic pancreatitis requiring antibiotics. Since his discharge patient: List constantly had an abdominal pain and he has not been able to eat much to the point that he noticed that his weight has reduced and he has lost 20 pounds. Over the last couple days abdominal pain has been severe and since 2:30 this morning pain is unbearable. He presented to emergency room with excruciating epigastric pain radiating to the whole belly belly and back. CT scan of the abdomen showed pancreatic pseudocyst which is new finding causing mass effect on the antrum and pylorus.  Patient has been unable to eat. No fever or chills. No diarrhea. No hematemesis. Patient continues take prednisone and methotrexate for his chronic conditions.    All 10 organ systems were reviewed and are negative unless mentioned above in HPI.    There are no active problems to display for this patient.    Past Medical History   Diagnosis Date   • Essential (primary) hypertension    • Gastroesophageal reflux disease    • High cholesterol    • Osteoporosis    • Pancreatitis, acute       History reviewed. No pertinent past surgical history.   Prescriptions Prior to Admission   Medication Sig Dispense Refill   • pantoprazole (PROTONIX) 40 MG tablet Take 1 tablet by mouth daily (before breakfast). 30 tablet 0   • methotrexate (RHEUMATREX) 2.5 MG tablet Take 8 tablets by mouth once a week. Make sure you know why you are taking this medication and how often you should take it. If this med is used for a condition that is not cancer, like  arthritis or psoriasis, it should be taken weekly, NOT daily. Taking this med more often than directed can cause serious side effects, even death. 12 tablet 0   • lisinopril-hydrochlorothiazide (PRINZIDE,ZESTORETIC) 20-12.5 MG per tablet Take 1 tablet by mouth daily.     • predniSONE (DELTASONE) 10 MG tablet Take 40 mg by mouth daily.      • atorvastatin (LIPITOR) 80 MG tablet Take 80 mg by mouth daily.     • ALENDRONATE SODIUM PO Take 70 mg by mouth once a week.      • vitamin - therapeutic multivitamins w/minerals (CENTRUM SILVER,THERA-M) Tab Take 1 tablet by mouth daily.     • Garlic 10 MG Cap Take 1 capsule by mouth daily.     • B Complex Vitamins (B-COMPLEX/B-12) Tab Take 100 mcg by mouth daily.     • Cholecalciferol (D3 SUPER STRENGTH) 2000 UNITS Cap Take 2,000 Units by mouth daily.     • docusate sodium (COLACE) 100 MG capsule Take 100 mg by mouth 2 times daily as needed for Constipation.     • Omega-3 Fatty Acids (FISH OIL) 1200 MG capsule Take 1,200 mg by mouth daily.     • calcium carbonate-vitamin D (CALTRATE+D) 600-400 MG-UNIT per tablet Take 1 tablet by mouth.       ALLERGIES:   Allergen Reactions   • Ibuprofen VOMITING      Social History   Substance Use Topics   • Smoking status: Current Every Day Smoker     Types: Cigarettes   • Smokeless tobacco: Not on file   • Alcohol use Yes        Family History:  No family history on file.       Physical Exam:     Vitals:    01/02/17 0230   BP: (!) 138/92   Pulse: 104   Resp:    Temp:              General:  Alert and restless mild distress  HEENT:  VERNON, No conjunctival pallor, ear canals clear,  no nasal discharge, oral mucosa moist, no tonsillar enlargement or erythema of posterior pharynx.  Neck:  No masses, no thyromegaly.  Lungs:  B/L air entry equal. No Wheezes. No Crepitation. No dullness on persussion.  Cardiovascular:  Normal S 1 and S 2, no S 3, S 4. No murmurs. No gallops.  Abdomen:   Diffuse tenderness worse in the epigastric region. Bowel sounds  are sluggish  Musculoskeletal:    No edema in Rt or Lt leg. Normal ROM B/L  Skin:  Warm and perfused. No rashes. No Decubitus ulcers.  Neuropsych:  CN 2-12  Intact. Sensory intact both sensation intact in all 4 extremities and face. Muscle strength 5/5 all 4 extremities.  Awake alert oriented x3 .  Lymph:  No cervical, supraclavicular or axillary lymphadenopathy.       Labs    Recent Labs  Lab 01/01/17  2343   WBC 13.7*   HGB 13.6   HCT 39.3      RBC 4.82   MCV 81.5   MCH 28.2   MCHC 34.6   SODIUM 131*   CHLORIDE 94*   BUN 14   CREATININE 0.76   CO2 28   POTASSIUM 4.0   BILIRUBIN 0.4   AST 43*   ALBUMIN 3.3*   ALKPT 84       CMP    Recent Labs  Lab 01/01/17  2343   SODIUM 131*   CHLORIDE 94*   BUN 14   GLUCOSE 272*   POTASSIUM 4.0   CO2 28       Recent Labs  Lab 01/01/17  2343   BILIRUBIN 0.4   AST 43*   ALBUMIN 3.3*   ALKPT 84   GPT 96*       BMP    Recent Labs  Lab 01/01/17  2343   SODIUM 131*   CHLORIDE 94*   BUN 14   GLUCOSE 272*   POTASSIUM 4.0   CO2 28   CREATININE 0.76   CALCIUM 8.7       Results for orders placed during the hospital encounter of 12/15/16   XR CHEST AP OR PA    Impression FINDINGS/IMPRESSION:      There is borderline prominence of the heart size and central vasculature,  which can be seen with cardiac decompensation. Strandy densities are noted  at the left lung base that may represent atelectasis or infiltrate.       CT scan Abdomen:  IMPRESSION:  1. Fluid/inflammatory changes surrounding the pancreas have nearly  completely resolved. However, there is a new circumscribed low attenuating  exophytic fluid collection arising from the head of the pancreas extending  to and creating mass effect on the adjacent antrum/pylorus consistent with  a pseudocyst.  2. Multiple additional low attenuating cystic lesions in the pancreatic  head are stable.    Discussed With Consultant :    No problems updated.      Assessment and Plan:       -Acute abdominal pain with underlying pseudocyst: Patient  will be put on Dilaudid PCA. IV fluids. Clear liquid diet. Gastroenterology will be consulted. Pro-calcitonin ordered. IV Protonix. There was plan to do an EUS as an outpatient.  -Significant weight loss: Secondary to mass effect procedures cyst on the pylorus and antrum.  -Leukocytosis: It could be related to chronic steroid use. Pro-calcitonin ordered. No antibiotics at this time patient did not have any fever and there is no evidence of any necrotic tissue in the pancreas.  -Hypertension: Continue lisinopril and hydrochlorothiazide. IV hydralazine p.r.n.  -Hyperglycemia: Patient again sliding scale insulin. I will do hemoglobin A1c.  -GI / DVTprophylaxis.  I reviewed the consult note from GI last admission. I also reviewed the Epic records              I discussed the patient's medical condition, proposed management plan, risks, benefits and alternatives with the patient/family in detail and is agreeable. I spend more than 30 minutes  in examining and formulating the plan and discussing with the patient.      Deanne Dyson MD  [place] [id number] [address] [address]

## 2021-02-27 NOTE — PROGRESS NOTES
[address] [facility] Adult  Hospitalist Group Hospitalist Progress Note Mamta Valdes MD 
Answering service: [phone] OR [phone] from in house phone Date of Service:  2021 NAME:  Mook Gerber :  1940 MRN:  [id number] Admission Summary:  
Mook Gerber is a [de-identified] y.o. male who presents for evaluation of facial droop. Patient is somewhat of a poor historian. He states that he has been having trouble swallowing for over a month. Unclear what kind of diet he has been able to eat and what he can. Cannot specify that and patient just saying \" I can swallow\". Patient also reports some problem with his balance since his swallowing problem started. Interval history / Subjective: F/U CVA Pt more confused today, sleepy. Sodium elevated. Not following commands, or waking up to talk much Assessment & Plan:  
 
Acute CVA 
- MRI revealed L medulopontine CVA 
- CT/CTA negative - Echo with EF 50-55% - Neurology following - ASA daily via NG Dysphagia - secondary to CVA 
- NG in place with tube feeding - Long discussion with family today regarding PEG. Daughter and son will continue to discuss, will need to make decision by Monday  
-  post NG placement - Speech following Initially hyponatremic, now hypernatremia - likely secondary to free water deficit 
- Start D5W 
- Increase FWF 
- Follow sodium, recheck this afternoon - Avoid overcorrection Possible  hematemesis - with esaphagitis on EGD  
- hold lovenox - Resume ASA - Monitor hemoglobin closely  
- Continue PPI, switch to PO if able HTN - holding oral medications for now due to dysphagia, but may resume if NG placed, Monitor, PRN IV medications Code status: DNR 
DVT prophylaxis: SCDs due to [facility] Care Plan discussed with: Patient/Family Anticipated Disposition: SNF/LTC,  PT, OT, RN and [facility]/Rehab Anticipated Discharge: Greater than 48 hours Hospital Problems  Date Reviewed: 4/2/2020 Codes Class Noted POA Goals of care, counseling/discussion ICD-10-CM: Z71.89 ICD-9-CM: V65.49  Unknown Unknown Feeding difficulties ICD-10-CM: R63.3 ICD-9-CM: 801. 3  Unknown Unknown Impaired mobility ICD-10-CM: Z74.09 
ICD-9-CM: 799.89  Unknown Unknown Altered mental status, unspecified altered mental status type ICD-10-CM: R41.82 
ICD-9-CM: 780.97  Unknown Unknown CVA (cerebral vascular accident) [facility]) ICD-10-CM: I63.9 ICD-9-CM: 434.91  2/21/2021 Unknown Review of Systems: A comprehensive review of systems was negative except for that written in the HPI. Vital Signs:  
 Last 24hrs VS reviewed since prior progress note. Most recent are: 
Visit Vitals BP (!) 145/86 Pulse 92 Temp 99.4 °F (37.4 °C) Resp 21 Ht 5' 2.99\" (1.6 m) Wt 57.1 kg (125 lb 14.4 oz) SpO2 97% BMI 22.31 kg/m² Intake/Output Summary (Last 24 hours) at 2/27/2021 1540 Last data filed at 2/27/2021 0800 Gross per 24 hour Intake 750 ml Output  Net 750 ml Physical Examination:  
 
I had a face to face encounter with this patient and independently examined them on 2/27/2021 as outlined below: 
 
     
Constitutional:  Asleep, not responding, elderly male ENT:  Oral mucosa moist, oropharynx benign. R facial droop, NG in place Resp:  CTA bilaterally. No wheezing/rhonchi/rales. No accessory muscle use CV:  Regular rhythm, normal rate, no murmurs, gallops, rubs GI:  Soft, non distended, non tender. normoactive bowel sounds, no hepatosplenomegaly Musculoskeletal:  No edema, warm, 2+ pulses throughout Neurologic:  Sleeping today, not following commands. Skin:  Good turgor, no rashes or ulcers Data Review:  
 Review and/or order of clinical lab test 
 Review and/or order of tests in the radiology section of CPT Review and/or order of tests in the medicine section of CPT Labs:  
 
Recent Labs  
  02/27/21 
0327 02/26/21 
5066 WBC 12.4* 11.2* HGB 15.1 14.6 HCT 47.0 45.4  302 Recent Labs  
  02/27/21 
0327 02/26/21 
0353 02/25/21 
0258 * 152* 147* K 3.5 3.5 3.6 * 119* 114* CO2 25 22 23 BUN 29* 27* 29* CREA 1.14 1.03 0.97 * 101* 98  
CA 8.8 8.8 9.1 MG 2.7* 2.7*  --   
PHOS 2.4* 2.6  -- No results for input(s): ALT, AP, TBIL, TBILI, TP, ALB, GLOB, GGT, AML, LPSE in the last 72 hours. No lab exists for component: SGOT, GPT, AMYP, HLPSE No results for input(s): INR, PTP, APTT, INREXT, INREXT in the last 72 hours. No results for input(s): FE, TIBC, PSAT, FERR in the last 72 hours. No results found for: FOL, RBCF No results for input(s): PH, PCO2, PO2 in the last 72 hours. No results for input(s): CPK, CKNDX, TROIQ in the last 72 hours. No lab exists for component: CPKMB Lab Results Component Value Date/Time Cholesterol, total 173 02/22/2021 12:50 AM  
 HDL Cholesterol 50 02/22/2021 12:50 AM  
 LDL, calculated 106.6 (H) 02/22/2021 12:50 AM  
 Triglyceride 82 02/22/2021 12:50 AM  
 CHOL/HDL Ratio 3.5 02/22/2021 12:50 AM  
 
Lab Results Component Value Date/Time Glucose (POC) 165 (H) 02/27/2021 11:35 AM  
 Glucose (POC) 115 (H) 02/27/2021 05:32 AM  
 Glucose (POC) 109 (H) 02/26/2021 11:30 PM  
 Glucose (POC) 94 02/26/2021 06:43 PM  
 Glucose (POC) 94 02/26/2021 12:00 PM  
 
Lab Results Component Value Date/Time  Color YELLOW/STRAW 02/21/2021 08:03 PM  
 Appearance CLEAR 02/21/2021 08:03 PM  
 Specific gravity 1.005 02/21/2021 08:03 PM  
 pH (UA) 8.5 (H) 02/21/2021 08:03 PM  
 Protein 30 (A) 02/21/2021 08:03 PM  
 Glucose Negative 02/21/2021 08:03 PM  
 Ketone Negative 02/21/2021 08:03 PM  
 Bilirubin Negative 02/21/2021 08:03 PM  
 Urobilinogen 0.2 02/21/2021 08:03 PM  
 Nitrites Negative 02/21/2021 08:03 PM  
 Leukocyte Esterase Negative 02/21/2021 08:03 PM  
 Epithelial cells FEW 02/21/2021 08:03 PM  
 Bacteria Negative 02/21/2021 08:03 PM  
 WBC 0-4 02/21/2021 08:03 PM  
 RBC 5-10 02/21/2021 08:03 PM  
 
 
 
Medications Reviewed:  
 
Current Facility-Administered Medications Medication Dose Route Frequency  dextrose 5% infusion  100 mL/hr IntraVENous CONTINUOUS  
 donepeziL (ARICEPT) tablet 5 mg  5 mg Oral QHS  aspirin (ASA) suppository 150 mg  150 mg Rectal DAILY Or  
 aspirin chewable tablet 81 mg  81 mg Per NG tube DAILY  sucralfate (CARAFATE) tablet 1 g  1 g Oral AC&HS  
 metoprolol (LOPRESSOR) injection 5 mg  5 mg IntraVENous Q8H  
 OLANZapine (ZyPREXA zydis) disintegrating tablet 5 mg  5 mg Oral QHS  LORazepam (ATIVAN) injection 1 mg  1 mg IntraVENous Q4H PRN  
 atorvastatin (LIPITOR) tablet 40 mg  40 mg Oral QHS  labetaloL (NORMODYNE;TRANDATE) injection 5 mg  5 mg IntraVENous Q6H PRN  pantoprazole (PROTONIX) 40 mg in 0.9% sodium chloride 10 mL injection  40 mg IntraVENous Q12H  
 [Held by provider] metoprolol succinate (TOPROL-XL) XL tablet 50 mg  50 mg Oral DAILY  sertraline (ZOLOFT) tablet 50 mg  50 mg Oral DAILY  sodium chloride (NS) flush 5-40 mL  5-40 mL IntraVENous Q8H  
 sodium chloride (NS) flush 5-40 mL  5-40 mL IntraVENous PRN  
 acetaminophen (TYLENOL) tablet 650 mg  650 mg Oral Q6H PRN Or  
 acetaminophen (TYLENOL) suppository 650 mg  650 mg Rectal Q6H PRN  polyethylene glycol (MIRALAX) packet 17 g  17 g Oral DAILY PRN  promethazine (PHENERGAN) tablet 12.5 mg  12.5 mg Oral Q6H PRN Or  
 ondansetron (ZOFRAN) injection 4 mg  4 mg IntraVENous Q6H PRN  
 [Held by provider] enoxaparin (LOVENOX) injection 40 mg  40 mg SubCUTAneous DAILY  
 
______________________________________________________________________ EXPECTED LENGTH OF STAY: 3d 0h 
ACTUAL LENGTH OF STAY:          6 
 
            
 Artemio Galan MD

## 2021-02-28 NOTE — PROGRESS NOTES
Bedside shift change report given to Ute Sweeney RN (oncoming nurse) by Jaime Parker (offgoing nurse). Report included the following information SBAR, Cardiac Rhythm nsr and Dual Neuro Assessment.

## 2021-02-28 NOTE — PROGRESS NOTES
[address] [facility] Adult  Hospitalist Group Hospitalist Progress Note Brian David MD 
Answering service: [phone] OR [phone] from in house phone Date of Service:  2021 NAME:  Nasim Rincon YOB: 1940 MRN:  [id number] Admission Summary:  
Nasim Rincon is a [de-identified] y.o. male who presents for evaluation of facial droop. Patient is somewhat of a poor historian. He states that he has been having trouble swallowing for over a month. Unclear what kind of diet he has been able to eat and what he can. Cannot specify that and patient just saying \" I can swallow\". Patient also reports some problem with his balance since his swallowing problem started. Interval history / Subjective: F/U CVA More talkative today, sodium improved. Still very confused. Remains restrained. Assessment & Plan:  
 
Acute CVA 
- MRI revealed L medulopontine CVA 
- CT/CTA negative - Echo with EF 50-55% - Neurology following - ASA daily via NG Dysphagia - secondary to CVA 
- NG in place with tube feeding - Long discussion with family today regarding PEG. Daughter and son will continue to discuss, will need to make decision by Monday  
- KUB post NG placement - Speech following Initially hyponatremic, now hypernatremia - likely secondary to free water deficit, improving 
- Continue D5W 
- Contineu FWF 
- Follow sodium, recheck this afternoon at 2pm 
- Avoid overcorrection Possible  hematemesis - with esaphagitis on EGD  
- hold lovenox - Resume ASA - Monitor hemoglobin closely  
- Continue PPI, switch to PO if able HTN - Resume oral metoprolol, DC IV. Monitor closely Code status: DNR 
DVT prophylaxis: SCDs due to [facility] Care Plan discussed with: Patient/Family Anticipated Disposition: SNF/LTC, HH PT, OT, RN and SAH/Rehab Anticipated Discharge: Greater than 48 hours Hospital Problems  Date Reviewed: 4/2/2020 Codes Class Noted POA Goals of care, counseling/discussion ICD-10-CM: Z71.89 ICD-9-CM: V65.49  Unknown Unknown Feeding difficulties ICD-10-CM: R63.3 ICD-9-CM: 958. 3  Unknown Unknown Impaired mobility ICD-10-CM: Z74.09 
ICD-9-CM: 799.89  Unknown Unknown Altered mental status, unspecified altered mental status type ICD-10-CM: R41.82 
ICD-9-CM: 780.97  Unknown Unknown CVA (cerebral vascular accident) [facility]) ICD-10-CM: I63.9 ICD-9-CM: 434.91  2/21/2021 Unknown Review of Systems: A comprehensive review of systems was negative except for that written in the HPI. Vital Signs:  
 Last 24hrs VS reviewed since prior progress note. Most recent are: 
Visit Vitals /66 (BP 1 Location: Right arm, BP Patient Position: At rest) Pulse 91 Temp 98.3 °F (36.8 °C) Resp 16 Ht 5' 2.99\" (1.6 m) Wt 58.9 kg (129 lb 12.8 oz) SpO2 100% BMI 23.00 kg/m² Intake/Output Summary (Last 24 hours) at 2/28/2021 1509 Last data filed at 2/28/2021 0800 Gross per 24 hour Intake 250 ml Output  Net 250 ml Physical Examination:  
 
I had a face to face encounter with this patient and independently examined them on 2/28/2021 as outlined below: 
 
     
Constitutional:  Asleep, not responding, elderly male ENT:  Oral mucosa moist, oropharynx benign. R facial droop, NG in place Resp:  CTA bilaterally. No wheezing/rhonchi/rales. No accessory muscle use CV:  Regular rhythm, normal rate, no murmurs, gallops, rubs GI:  Soft, non distended, non tender. normoactive bowel sounds, no hepatosplenomegaly Musculoskeletal:  No edema, warm, 2+ pulses throughout Neurologic:  Awake, and alert. + slurred speech. A x O x 0 today Skin:  Good turgor, no rashes or ulcers Data Review:  
 Review and/or order of clinical lab test 
Review and/or order of tests in the radiology section of CPT Review and/or order of tests in the medicine section of [facility] Labs:  
 
Recent Labs  
  02/28/21 0229 02/27/21 0327 WBC 11.8* 12.4* HGB 14.1 15.1 HCT 43.9 47.0  320 Recent Labs  
  02/28/21 
1407 02/28/21 0229 02/27/21 
1540 02/27/21 
0327 02/26/21 
9369 * 146* 150* 154* 152* K 3.7 3.1* 3.3* 3.5 3.5 * 113* 119* 122* 119* CO2 31 28 29 25 22 BUN 16 22* 27* 29* 27* CREA 0.81 0.93 1.04 1.14 1.03  
* 151* 147* 116* 101* CA 8.1* 7.9* 8.2* 8.8 8.8 MG  --  2.2  --  2.7* 2.7* PHOS  --  2.5*  --  2.4* 2.6 No results for input(s): ALT, AP, TBIL, TBILI, TP, ALB, GLOB, GGT, AML, LPSE in the last 72 hours. No lab exists for component: SGOT, GPT, AMYP, HLPSE No results for input(s): INR, PTP, APTT, INREXT, INREXT in the last 72 hours. No results for input(s): FE, TIBC, PSAT, FERR in the last 72 hours. No results found for: FOL, RBCF No results for input(s): PH, PCO2, PO2 in the last 72 hours. No results for input(s): CPK, CKNDX, TROIQ in the last 72 hours. No lab exists for component: CPKMB Lab Results Component Value Date/Time Cholesterol, total 173 02/22/2021 12:50 AM  
 HDL Cholesterol 50 02/22/2021 12:50 AM  
 LDL, calculated 106.6 (H) 02/22/2021 12:50 AM  
 Triglyceride 82 02/22/2021 12:50 AM  
 CHOL/HDL Ratio 3.5 02/22/2021 12:50 AM  
 
Lab Results Component Value Date/Time Glucose (POC) 125 (H) 02/28/2021 11:44 AM  
 Glucose (POC) 133 (H) 02/28/2021 05:50 AM  
 Glucose (POC) 145 (H) 02/28/2021 12:15 AM  
 Glucose (POC) 150 (H) 02/27/2021 05:59 PM  
 Glucose (POC) 165 (H) 02/27/2021 11:35 AM  
 
Lab Results Component Value Date/Time  Color YELLOW/STRAW 02/21/2021 08:03 PM  
 Appearance CLEAR 02/21/2021 08:03 PM  
 Specific gravity 1.005 02/21/2021 08:03 PM  
 pH (UA) 8.5 (H) 02/21/2021 08:03 PM  
 Protein 30 (A) 02/21/2021 08:03 PM  
 Glucose Negative 02/21/2021 08:03 PM  
 Ketone Negative 02/21/2021 08:03 PM  
 Bilirubin Negative 02/21/2021 08:03 PM  
 Urobilinogen 0.2 02/21/2021 08:03 PM  
 Nitrites Negative 02/21/2021 08:03 PM  
 Leukocyte Esterase Negative 02/21/2021 08:03 PM  
 Epithelial cells FEW 02/21/2021 08:03 PM  
 Bacteria Negative 02/21/2021 08:03 PM  
 WBC 0-4 02/21/2021 08:03 PM  
 RBC 5-10 02/21/2021 08:03 PM  
 
 
 
Medications Reviewed:  
 
Current Facility-Administered Medications Medication Dose Route Frequency  dextrose 5% infusion  100 mL/hr IntraVENous CONTINUOUS  
 donepeziL (ARICEPT) tablet 5 mg  5 mg Oral QHS  aspirin (ASA) suppository 150 mg  150 mg Rectal DAILY Or  
 aspirin chewable tablet 81 mg  81 mg Per NG tube DAILY  sucralfate (CARAFATE) tablet 1 g  1 g Oral AC&HS  
 metoprolol (LOPRESSOR) injection 5 mg  5 mg IntraVENous Q8H  
 OLANZapine (ZyPREXA zydis) disintegrating tablet 5 mg  5 mg Oral QHS  LORazepam (ATIVAN) injection 1 mg  1 mg IntraVENous Q4H PRN  
 atorvastatin (LIPITOR) tablet 40 mg  40 mg Oral QHS  labetaloL (NORMODYNE;TRANDATE) injection 5 mg  5 mg IntraVENous Q6H PRN  pantoprazole (PROTONIX) 40 mg in 0.9% sodium chloride 10 mL injection  40 mg IntraVENous Q12H  
 [Held by provider] metoprolol succinate (TOPROL-XL) XL tablet 50 mg  50 mg Oral DAILY  sertraline (ZOLOFT) tablet 50 mg  50 mg Oral DAILY  sodium chloride (NS) flush 5-40 mL  5-40 mL IntraVENous Q8H  
 sodium chloride (NS) flush 5-40 mL  5-40 mL IntraVENous PRN  
 acetaminophen (TYLENOL) tablet 650 mg  650 mg Oral Q6H PRN Or  
 acetaminophen (TYLENOL) suppository 650 mg  650 mg Rectal Q6H PRN  polyethylene glycol (MIRALAX) packet 17 g  17 g Oral DAILY PRN  promethazine (PHENERGAN) tablet 12.5 mg  12.5 mg Oral Q6H PRN Or  
 ondansetron (ZOFRAN) injection 4 mg  4 mg IntraVENous Q6H PRN  
 [Held by provider] enoxaparin (LOVENOX) injection 40 mg  40 mg SubCUTAneous DAILY  
 
______________________________________________________________________ EXPECTED LENGTH OF STAY: 3d 0h 
ACTUAL LENGTH OF STAY: Aniket. Staffa Leopolda 48 Vivian Crowell MD

## 2021-02-28 NOTE — PROGRESS NOTES
Bedside and Verbal shift change report given to [address] [facility],[address] (oncoming nurse) by Robbi Salas (offgoing nurse). Report included the following information SBAR, Kardex, Intake/Output, MAR, Recent Results, Cardiac Rhythm NSR and Dual Neuro Assessment.

## 2021-03-01 NOTE — PROGRESS NOTES
0800 Dr. Torres Kan s/w pt's family and wishes to make pt comfort care. Orders to keep hi-flow O2 until pt's family arrives. 1041 Pt's breathing labored once hi-flow removed and NC applied. IV morphine given. Family requests for face tent O2. 
 
1120 Pt appears restless. IV Ativan given. [facility] arrives. IV Morphine and IV Ativan given frequently to make pt comfortable. [facility]. Vesna arrives. 1256 Pt passed. Dr. Torres Kan pronounced. Supervisors made aware. Post mortal care performed after family leaves.

## 2021-03-01 NOTE — PROGRESS NOTES
Bedside and Verbal shift change report given to Idalia RN (oncoming nurse) by [address] RN (offgoing nurse). Report included the following information SBAR, Kardex, Intake/Output, MAR, Recent Results, Cardiac Rhythm NSR and Dual Neuro Assessment.

## 2021-03-01 NOTE — PROGRESS NOTES
responded to pt death. Family was at bedside and  anointed pt. Please contact [phone] [facility] for further support. [address] Jasmeet Babcock, MACE 
 [phone]

## 2021-03-01 NOTE — PROGRESS NOTES
responded to RRT. Pt being attended to and no family present at this time. Please contact [phone] [facility] for further support. [address] Jasmeet Babcock, [facility] 
 [phone]

## 2021-03-01 NOTE — PROGRESS NOTES
SLP Contact Note Noted pt and family pursuing comfort measures. Would recommend pt's favorite flavors via oral swabs and can initiate a diet for comfort with all parties acknowledging the likelihood of aspiration. Will sign-off. Thank you, Emmy Rivas M.Ed, CCC-SLP Speech-Language Pathologist

## 2021-03-01 NOTE — PROGRESS NOTES
Berlin Love was waiting for Nancyemily Lopezre to finish Mass to request the Sacrament of the [address] for Mr. Matt. Mr. Guilelrmo Perez family was at bedside with him. Silvano Toledo offered the Sacrament of the Sick at bedside and prayed with the family. He also reported that if the family needed assistance with arranging a  Mass he would be willing to help them. SHERRI Bowers, RN, ACSW, LCSW  Page:  [phone]

## 2021-03-01 NOTE — PROGRESS NOTES
Physical Therapy Chart reviewed, noted order for comfort measures only. Will complete PT order.  
 
Raffi Harrell, PT, DPT

## 2021-03-01 NOTE — HOSPICE
[employer] Good Help to Those in Need 
[phone] Patient Name: Adrian Chavis YOB: 1940 Age: [de-identified] y.o. [employer] RN Note:  Hospice consult noted. Chart reviewed. Plan of care discussed with patients nurse & care manager. In to meet with patient, and his daughter, Dannielle Chapman and son Remigio Britton. Both Joya Arita and Artist Tommy listed on patient's advanced directives. Upon entering patient room, patient appears distressed. Respirations are 36, gasping and very shallow. Pt very warm to touch. Non-responsive. Dr. Beckwith Richland on the unit, and discussed plan of care to provide symptom medications IV. Nurse, Go Parker RN provided PRN dosing of Morphine and Lorazepam as ordered. Patient required multiple doses of IV morphine and lorazepam. Discussed with family bedside, plan not to discuss hospice care and to focus on getting patient more comfortable. Family state appreciation. Support bedside as patient was very symptomatic. Active listening and real time education of dying process provided. Family share that patient was in the [employer], and has  arrangements made through [facility] in [place], [place]. Hospice team reviewed book, Gone From My Sight, to provide education on dying process. Hospice social worker, Paola Valdes LCSW, also came bedside to offer support to family. Prayers provided as family request. 
 
Pastoral care paged and came bedside to offer support. Family request patient receive Anointing of the sick. Father arrived bedside and offered Anointing of the Sick, as patient was taking last few deaths. Family bedside, and express appreciation. Dr. Beckwith Richland pronounced patient at time of death. 12:56. Family provided with hospice information to be of assistance, or to answer questions in the future. Thank you for the opportunity to be of service to this patient. Matthew Sanders RN, [facility] Hospice Nurse Liaison [phone] Mobile [phone] Office

## 2021-03-01 NOTE — PROGRESS NOTES
Pt RRT called for acute hypoxic resp failure, severe sepsis. Initiated HFNC, ABG reviewed, CXR reviewed. Called family. Concern for ongoing aspiration. Family stated they decided against PEG placement. At this time I suggested we move towards comfort measures. Patient would be a poor BiPAP candidate, as unable to remove the mask, and having ongogin aspiration. Family decided to move towards comfort measures. Orders placed for comfort. Full note to follow

## 2021-03-01 NOTE — DISCHARGE SUMMARY
[facility] Adult  Hospitalist Group Death Discharge Summary PATIENT ID: Aggie Vásquez MRN: [id number] YOB: 1940 DATE OF ADMISSION: 2/21/2021  3:15 PM   
PRIMARY CARE PROVIDER: Jennifer Jean MD  
ATTENDING PHYSICIAN: Mayela Valero MD 
CONSULTATIONS:  
IP CONSULT TO NEUROLOGY 
IP CONSULT TO GASTROENTEROLOGY 
IP CONSULT TO PALLIATIVE CARE - PROVIDER PROCEDURES/SURGERIES:  
* No surgery found * REASON FOR ADMISSION: <principal problem not specified> acute CVA HOSPITAL PROBLEM LIST: 
Patient Active Problem List  
Diagnosis Code  S/P colonoscopy Z98.890  
 Mixed hyperlipidemia E78.2  Benign prostatic hyperplasia with weak urinary stream N40.1, R39.12  
 Gastroesophageal reflux disease without esophagitis K21.9  Anxiety F41.9  Benign essential HTN I10  
 Encounter for examination for admission to assisted living facility Z02.2  CVA (cerebral vascular accident) ([facility] [address] I63.9  
 Goals of care, counseling/discussion Z71.89  Feeding difficulties R63.3  Impaired mobility Z74.09  
 Altered mental status, unspecified altered mental status type R41.82 DATE AND TIME OF DEATH: 03/01/2021, 12:56 pm  
 
CODE STATUS AT DISCHARGE: 
 Full Code X DNR Partial  
X Comfort Care DISCHARGE DIAGNOSES:  
Acute CVA Dysphagia Hyponatremia Hypernatremia HTN Hematemasis Severe Sepsis Acute hypoxic respiratory failure Brief HPI and Hospital Course:   
 
Jose Juan Bateman a [de-identified] y. o. male who presented for facial droop found to have acute CVA. Ongoing dysphagia secondary to CVA, and required NG tube feeding. Ongoing discussion with family regarding plan of care, PEG tube. Pt had acute decompensation on 03/01 early am with signs of severe sepsis including tachycardia, tachypnea, and fever. Discussed with family, and they opted for comfort measures given his CVA and ongoing dysphagia. They had decided against a feeding tube.  He was switched to comfort measures, was given morphine and ativan for discomfort, SOB and anxiety. HFNC was removed, and dophoff was removed. Patient passed away comfortably with family at bedside. On exam, no heart sounds or breath sounds were noted after 1 minute of auscultation. Pupils were fixed and dilated without pupillary light reflex. Patient was pronounced dead on 3/1/2021  at 12:56pm. 
 
Cause of Death: 
Immediate: Acute CVA Events related to death: 
Was code called: NO 
 notified: NO Family notified: Nancy Jones Autopsy requested: NO 
Death certificate completed: NO 
Code Status Prior to Death: DNR  
 
PHYSICAL EXAMINATION AT DISCHARGE: 
GEN: No response to verbal or tactile stimuli HEENT: Pupils fixed, dilated CV: No cardiac activity or heart sounds appreciated. No carotid pulse. PULM: No respiratory effort or spontaneous respirations. Ext: No peripheral pulses.  
 
 
Signed:  
Dale King MD 
Date of Service:  3/1/2021 
1:31 PM

## 2021-03-01 NOTE — PROGRESS NOTES
03/01/21 3286 Vital Signs Pulse (Heart Rate) (!) 124 Resp Rate 29  
O2 Sat (%) (!) 86 % Patient continues to desat despite intervention. Paged Domingo Ward MD at 0700. Recommended to call RR. RR called at 0702.

## 2021-03-01 NOTE — HOSPICE
[employer] Good Help to Those in Need 
[phone] Patient Name: Chelsea Balbuena YOB: 1940 Age: [de-identified] y.o. [employer] RN Note:  Hospice consult received, reviewing chart. Will follow up with Unit Nurse and Care Manager to discuss plan of care, patient status and discharge disposition within the hour. Thank you for the opportunity to be of service to this patient. 11:00 Spoke with Shari KENNEY.  has not offered Hospice choice. [employer] will not make contact with patient or family, until requested to do so by hospital Case Management. Please call: 
 
Pieter Aleman RN, [facility] Hospice Nurse Liaison [phone] Mobile [phone] Office

## 2021-03-01 NOTE — PROGRESS NOTES
Requested to provide support to Mr Oxana Torres in room 659 and his family. Patient's son & daughter were at Mr Shaka's bedside when  arrived; Hospice nurse was also present. Hospice nurse shared that family would like for patient to receive anointing by Ken cervantes as he was near EOL. When  arrived at the hospital chapel, Father Bay Bucknerlinda was conducting mass; Sr Mango Neil agreed to inform him of family's request when he was finished with mass. : . Sabina Oropeza. [facility] [facility], to contact [phone] Anthony lucina call: [phone]

## 2021-03-01 NOTE — PROGRESS NOTES
Angela NP Progress note Name: Los Beauchamp YOB: 1940 MRN: [id number] Admission Date: 2/21/2021  3:15 PM 
 
Date of service: 3/1/2021 12:13 AM 
 
Situation / Background:  
 
Asked by primary nurse to evaluate patient's new onset tachycardia, tachypnea and low-grade temp. 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 Have been stable up until this evening when RN noted above. Patient is very confused and unable to provide complaint or history. Appears restless, but in no distress Subjective:  
 
 
 
Objective: · Alert, unable to evaluate orientation. Per RN, mental status is below baseline. At his best, he can hold a conversation · Rest rapid, rate 32 on 4 L nasal cannula O2 sat is 92%. Oral mucosa dry · Scattered rhonchi throughout, no rales or wheezing · Abdomen soft, nontender nondistended. NG tube in place. RN had ordered KUB because she was concerned about Dobbhoff placement but it is fine · Incontinent of clear urine · Extremities without edema or trauma Patient Vitals for the past 12 hrs: 
 Temp Pulse Resp BP SpO2  
03/01/21 0039  (!) 129 (!) 31 (!) 157/76 92 % 03/01/21 0019  (!) 128 23  92 % 02/28/21 2234  (!) 119 (!) 35  91 % 02/28/21 2230  (!) 119 26  90 % 02/28/21 2226  (!) 118 (!) 34  (!) 89 % 02/28/21 2225  (!) 119 24  91 % 02/28/21 2220  (!) 119 (!) 32  93 % 02/28/21 2200 100 °F (37.8 °C) (!) 120 24 134/75 99 % 02/28/21 1745 97.9 °F (36.6 °C) 85 20 114/72 99 % 02/28/21 1408 98.3 °F (36.8 °C) 91 16 114/66 100 % 02/28/21 1353  89  114/66  Assessment/ Plan:  
 
Tachypnea, tachycardia, new low-grade temp · Concern is  fluid overload v.  Pneumonia · Portable CXR 2/21/2021 showed possible pulmonary edema with bibasilar atelectasis. CXR tonight shows no acute findings · add lactic acid and procalcitonin to a.m. labs · Morphine 1 mg now · Asking RT to consider humidified Ventimask or similar ADDENDUM 0545:  Still w upper airway congestion. Now on face tent w humidification , SaO2 95%. Labs reviewed. Patient Vitals for the past 4 hrs: 
 Temp Pulse Resp BP SpO2  
03/01/21 0300  (!) 130 21 124/78 91 % 03/01/21 0257     91 % 03/01/21 0255  (!) 132 (!) 32  (!) 89 % 03/01/21 0200 (!) 100.9 °F (38.3 °C) (!) 124 28 (!) 143/80 92 % 03/01/21 0157     94 % Recent Results (from the past 8 hour(s)) GLUCOSE, POC Collection Time: 03/01/21  1:03 AM  
Result Value Ref Range Glucose (POC) 128 (H) 65 - 100 mg/dL Performed by Aldo Schneider MAGNESIUM Collection Time: 03/01/21  1:55 AM  
Result Value Ref Range Magnesium 2.1 1.6 - 2.4 mg/dL METABOLIC PANEL, BASIC Collection Time: 03/01/21  1:55 AM  
Result Value Ref Range Sodium 147 (H) 136 - 145 mmol/L Potassium 3.4 (L) 3.5 - 5.1 mmol/L Chloride 110 (H) 97 - 108 mmol/L  
 CO2 30 21 - 32 mmol/L Anion gap 7 5 - 15 mmol/L Glucose 129 (H) 65 - 100 mg/dL BUN 12 6 - 20 MG/DL Creatinine 0.81 0.70 - 1.30 MG/DL  
 BUN/Creatinine ratio 15 12 - 20 GFR est AA >60 >60 ml/min/1.73m2 GFR est non-AA >60 >60 ml/min/1.73m2 Calcium 8.0 (L) 8.5 - 10.1 MG/DL  
PHOSPHORUS Collection Time: 03/01/21  1:55 AM  
Result Value Ref Range Phosphorus 3.9 2.6 - 4.7 MG/DL  
CBC WITH AUTOMATED DIFF Collection Time: 03/01/21  1:55 AM  
Result Value Ref Range WBC 14.1 (H) 4.1 - 11.1 K/uL  
 RBC 4.33 4.10 - 5.70 M/uL  
 HGB 13.8 12.1 - 17.0 g/dL HCT 43.0 36.6 - 50.3 % MCV 99.3 (H) 80.0 - 99.0 FL  
 MCH 31.9 26.0 - 34.0 PG  
 MCHC 32.1 30.0 - 36.5 g/dL  
 RDW 12.5 11.5 - 14.5 % PLATELET 869 635 - 577 K/uL MPV 9.7 8.9 - 12.9 FL  
 NRBC 0.0 0  WBC ABSOLUTE NRBC 0.00 0.00 - 0.01 K/uL NEUTROPHILS 80 (H) 32 - 75 % LYMPHOCYTES 11 (L) 12 - 49 % MONOCYTES 7 5 - 13 % EOSINOPHILS 2 0 - 7 % BASOPHILS 0 0 - 1 % IMMATURE GRANULOCYTES 0 0.0 - 0.5 % ABS. NEUTROPHILS 11.2 (H) 1.8 - 8.0 K/UL  
 ABS. LYMPHOCYTES 1.5 0.8 - 3.5 K/UL  
 ABS. MONOCYTES 0.9 0.0 - 1.0 K/UL  
 ABS. EOSINOPHILS 0.3 0.0 - 0.4 K/UL  
 ABS. BASOPHILS 0.0 0.0 - 0.1 K/UL  
 ABS. IMM. GRANS. 0.1 (H) 0.00 - 0.04 K/UL  
 DF AUTOMATED    
LACTIC ACID Collection Time: 03/01/21  2:06 AM  
Result Value Ref Range Lactic acid 1.0 0.4 - 2.0 MMOL/L  
PROCALCITONIN Collection Time: 03/01/21  2:06 AM  
Result Value Ref Range Procalcitonin <0.05 ng/mL Kobe Dykes, MSN, RN, NP-C 
[phone] or via Perfect [employer]

## 2021-03-01 NOTE — PROGRESS NOTES
+                                                                                                                                  
                                               
 
Critical Care Documentation Name: Shari Villar YOB: 1940 MRN: [id number] Admission Date: 2/21/2021  3:15 PM 
 
Date of service: 3/1/2021 Active Diagnoses: 
 
Hospital Problems  Date Reviewed: 4/2/2020 Codes Class Noted POA Goals of care, counseling/discussion ICD-10-CM: Z71.89 ICD-9-CM: V65.49  Unknown Unknown Feeding difficulties ICD-10-CM: R63.3 ICD-9-CM: 995. 3  Unknown Unknown Impaired mobility ICD-10-CM: Z74.09 
ICD-9-CM: 799.89  Unknown Unknown Altered mental status, unspecified altered mental status type ICD-10-CM: R41.82 
ICD-9-CM: 780.97  Unknown Unknown CVA (cerebral vascular accident) [facility]) ICD-10-CM: I63.9 ICD-9-CM: 434.91  2/21/2021 Unknown Chief Complaint: Hypoxia Severe sepsis Clinical Presentation: 
Zoltan Balderrama a [de-identified] y. o. male who presented for facial droop found to have acute CVA. Ongoing dysphagia secondary to CVA, and required NG tube feeding. Pt this am had signficiant desaturations and sign of acute severe sepsis including tachypnea, tachycardia and fever. RRT was called as patient was hypoxic to 87% on 6LNC, with increased WOB. Physical Exam: Hypoxia with RR >30, Saturations of 87% on 6LNC, BP was stable 140/82, Febrile to 102.2 Gen: Severe respiratory distress, elderly, R facial droop HEENT: NC/AT, sclera anicteric, NGT in place CV: Regular, Tachycardic, normal S1 and S2, no pedal edema Resp: Course bilaterally, with transmitted upper airway sounds. Tachypnea, with increased work of breathing. Abd: NT/ND, normal bowel sounds, no rebound or guarding Ext: 2+ pulses, no edema Skin: No rashes or lesions Neuro: Unresponsive, withdraws to pain bilaterally. + R facial droop Data Reviewed:   
All diagnostic labs and studies have been reviewed. Assessment and Plan: 
Marin sandoval [de-identified] y. o. male who presented for facial droop found to have acute CVA, now with acute sepsis and hypoxic respiratory failure likely secondary to aspiration - Placed on HFNC, CXR and ABG reviewed - Lactic and procalcitonin reviewed - Called family to discuss moving to higher level of care vs. Hospice. Patient is at very high risk of aspiration if placed on BiPAP, and would need ICU level of care, as I am not confident he would be able to remove the mask. - After multiple discussions, family had decided against PEG long term, and decided to move forward with comfort measures. - Comfort orders then initiated. - We agreed to keep on HFNC, until they arrive at bedside.  
- Hold off on abx given patient is now comfort. Medications Administered:  
Sedation: [ ] yes [ ] no Anxiolytics: Gio ] yes [ ] no Antiarrhythmics: [ ] yes [ ] no Antihypertensives: [ ] yes [ ] no  
Pressors: [ ] yes [ ] no IVF's: Gio ] yes [ ] no HFNC [X] yes Critical Care Attestation: This patient is unstable and critically ill. Due to a high probability of clinically significant, life threatening deterioration, the patient required my highest level of preparedness to intervene emergently and I personally spent this critical care time directly and personally managing the patient. This critical care time included obtaining a history; examining the patient; pulse oximetry; ordering and review of studies; arranging urgent treatment with development of a management plan; evaluation of patient's response to treatment; frequent reassessment; and, discussions with other providers and/or family. This critical care time was performed to assess and manage the high probability of imminent, life-threatening deterioration that could result in multi-organ failure and death.  It was exclusive of separately billable procedures, treating other patients, and teaching time. 
 
 
Time Spent:  
 
I personally spent 55 minutes in providing critical care time.  
 
Acosta Cardoza MD 
3/1/2021 
7:56 AM

## 2021-03-01 NOTE — PROGRESS NOTES
Occupational Therapy Chart reviewed, noted order for comfort measures only. Will complete OT order.  
 
Keysha Haywood, OTR/L

## 2021-03-01 NOTE — PROGRESS NOTES
Transition of Care Plan  RUR- 23 %  Medium Risks  DISPOSITION: await hospice eval for possible GIP admission Reviewed chart for transitions of care,and discussed in rounds. Discussed the case with Dr. Lou Peoples, indicated GIP need. Attempted to send referral on CC link and call family but noted [facility] had already received the referral. Called and spoke with Claudene Bellini, [phone], hospice liaison for [facility], to check on referral to avoid sending duplicate referral.  
 
CM awaits call from [place] and then will call family to offer FOC/inform their rights. 11:45 AM: Met patient's son Quintin and daughter SUE at bedside and discussed hospice referral, both are agreeable. Informed Claudene Bellini, [phone], hospice liaison for [facility]. She is seeing the patient shortly.   
 
REBECA Urrutia

## 2021-03-06 LAB
BACTERIA SPEC CULT: NORMAL
SERVICE CMNT-IMP: NORMAL
